# Patient Record
Sex: MALE | Race: BLACK OR AFRICAN AMERICAN | NOT HISPANIC OR LATINO | Employment: OTHER | URBAN - METROPOLITAN AREA
[De-identification: names, ages, dates, MRNs, and addresses within clinical notes are randomized per-mention and may not be internally consistent; named-entity substitution may affect disease eponyms.]

---

## 2017-01-12 ENCOUNTER — OFFICE VISIT (OUTPATIENT)
Dept: FAMILY MEDICINE | Facility: CLINIC | Age: 67
End: 2017-01-12
Payer: MEDICARE

## 2017-01-12 VITALS
HEART RATE: 71 BPM | OXYGEN SATURATION: 97 % | TEMPERATURE: 98 F | BODY MASS INDEX: 23.08 KG/M2 | HEIGHT: 67 IN | SYSTOLIC BLOOD PRESSURE: 160 MMHG | DIASTOLIC BLOOD PRESSURE: 88 MMHG | WEIGHT: 147.06 LBS

## 2017-01-12 DIAGNOSIS — F99 MENTAL DISORDER: ICD-10-CM

## 2017-01-12 DIAGNOSIS — F10.10 EXCESSIVE DRINKING ALCOHOL: ICD-10-CM

## 2017-01-12 DIAGNOSIS — D50.9 MICROCYTIC ANEMIA: ICD-10-CM

## 2017-01-12 DIAGNOSIS — I10 ESSENTIAL HYPERTENSION: Primary | ICD-10-CM

## 2017-01-12 LAB
ANISOCYTOSIS BLD QL SMEAR: ABNORMAL
BASOPHILS # BLD AUTO: 0.09 K/UL
BASOPHILS NFR BLD: 1.3 %
BURR CELLS BLD QL SMEAR: ABNORMAL
DACRYOCYTES BLD QL SMEAR: ABNORMAL
DIFFERENTIAL METHOD: ABNORMAL
EOSINOPHIL # BLD AUTO: 0.2 K/UL
EOSINOPHIL NFR BLD: 3.4 %
ERYTHROCYTE [DISTWIDTH] IN BLOOD BY AUTOMATED COUNT: 20.8 %
HCT VFR BLD AUTO: 32.1 %
HGB BLD-MCNC: 9.1 G/DL
HYPOCHROMIA BLD QL SMEAR: ABNORMAL
LYMPHOCYTES # BLD AUTO: 2.5 K/UL
LYMPHOCYTES NFR BLD: 36 %
MCH RBC QN AUTO: 17 PG
MCHC RBC AUTO-ENTMCNC: 28.3 %
MCV RBC AUTO: 60 FL
MONOCYTES # BLD AUTO: 1.1 K/UL
MONOCYTES NFR BLD: 16 %
NEUTROPHILS # BLD AUTO: 2.9 K/UL
NEUTROPHILS NFR BLD: 43.3 %
OVALOCYTES BLD QL SMEAR: ABNORMAL
PLATELET # BLD AUTO: 407 K/UL
PLATELET BLD QL SMEAR: ABNORMAL
PMV BLD AUTO: 9.4 FL
POIKILOCYTOSIS BLD QL SMEAR: ABNORMAL
POLYCHROMASIA BLD QL SMEAR: ABNORMAL
RBC # BLD AUTO: 5.36 M/UL
SCHISTOCYTES BLD QL SMEAR: ABNORMAL
SCHISTOCYTES BLD QL SMEAR: PRESENT
TARGETS BLD QL SMEAR: ABNORMAL
WBC # BLD AUTO: 6.83 K/UL

## 2017-01-12 PROCEDURE — 3077F SYST BP >= 140 MM HG: CPT | Mod: S$GLB,,, | Performed by: FAMILY MEDICINE

## 2017-01-12 PROCEDURE — 99499 UNLISTED E&M SERVICE: CPT | Mod: S$GLB,,, | Performed by: FAMILY MEDICINE

## 2017-01-12 PROCEDURE — 3079F DIAST BP 80-89 MM HG: CPT | Mod: S$GLB,,, | Performed by: FAMILY MEDICINE

## 2017-01-12 PROCEDURE — 1159F MED LIST DOCD IN RCRD: CPT | Mod: S$GLB,,, | Performed by: FAMILY MEDICINE

## 2017-01-12 PROCEDURE — 99213 OFFICE O/P EST LOW 20 MIN: CPT | Mod: S$GLB,,, | Performed by: FAMILY MEDICINE

## 2017-01-12 PROCEDURE — 85025 COMPLETE CBC W/AUTO DIFF WBC: CPT

## 2017-01-12 RX ORDER — AMLODIPINE BESYLATE 10 MG/1
10 TABLET ORAL DAILY
Qty: 30 TABLET | Refills: 11 | Status: SHIPPED | OUTPATIENT
Start: 2017-01-12 | End: 2018-01-22 | Stop reason: SDUPTHER

## 2017-01-12 NOTE — PROGRESS NOTES
Subjective:      Patient ID: Gama Huertas is a 66 y.o. male.    Chief Complaint: Follow-up (3 month f/u check-up and tetanus shot)    HPI Comments: Follow up hypertension, anemia; last visit august; never returned til now;     Review of Systems   Constitutional: Negative for appetite change, fatigue, fever and unexpected weight change.        Quit drinking bourbon and coke 5 months ago; has a lady friend   HENT: Negative for congestion, ear pain, sinus pressure and sore throat.    Eyes: Negative for pain and visual disturbance.   Respiratory: Negative for shortness of breath.    Cardiovascular: Negative for chest pain.   Gastrointestinal: Negative for abdominal pain, constipation and diarrhea.   Endocrine: Negative for polyuria.   Genitourinary: Negative for difficulty urinating and frequency.   Musculoskeletal: Negative for arthralgias, back pain and myalgias.        Spasms,   Skin: Negative for color change.        Itches all over   Allergic/Immunologic: Negative.    Neurological: Negative for syncope, weakness and headaches.   Hematological: Does not bruise/bleed easily.   Psychiatric/Behavioral: Negative for dysphoric mood and suicidal ideas. The patient is not nervous/anxious.    All other systems reviewed and are negative.    Objective:     Physical Exam   Constitutional: He is oriented to person, place, and time. He appears well-developed and well-nourished. No distress.   HENT:   Head: Normocephalic and atraumatic.   Right Ear: External ear normal.   Left Ear: External ear normal.   Mouth/Throat: Oropharynx is clear and moist. No oropharyngeal exudate.   Eyes: Conjunctivae and EOM are normal. Pupils are equal, round, and reactive to light. Right eye exhibits no discharge. Left eye exhibits no discharge. No scleral icterus.   Neck: Normal range of motion. Neck supple. No JVD present. No tracheal deviation present. No thyromegaly present.   Cardiovascular: Normal rate and regular rhythm.  Exam reveals no  gallop and no friction rub.    No murmur heard.  Pulmonary/Chest: Effort normal and breath sounds normal. No stridor. No respiratory distress. He has no wheezes. He has no rales. He exhibits no tenderness.   Abdominal: Soft. He exhibits no distension and no mass. There is no tenderness. There is no rebound and no guarding.   Musculoskeletal: Normal range of motion. He exhibits no edema or tenderness.   Lymphadenopathy:     He has no cervical adenopathy.   Neurological: He is alert and oriented to person, place, and time. He has normal reflexes. He displays normal reflexes. No cranial nerve deficit. He exhibits normal muscle tone. Coordination normal.   Skin: Skin is warm and dry. No rash noted. He is not diaphoretic. No erythema. No pallor.   Psychiatric: He has a normal mood and affect.   Bizarre affect as always   Nursing note and vitals reviewed.    Assessment:     1. Essential hypertension    2. Microcytic anemia    3. Excessive drinking alcohol    4. Mental disorder      Plan:     New Prescriptions    AMLODIPINE (NORVASC) 10 MG TABLET    Take 1 tablet (10 mg total) by mouth once daily.     Discontinued Medications    HYDROXYZINE HCL (ATARAX) 25 MG TABLET    TAKE 1 TABLET BY MOUTH THREE TIMES A DAY AS NEEDED FOR ITCHING     Modified Medications    No medications on file       Essential hypertension  -     CBC auto differential; Future; Expected date: 1/12/17    Microcytic anemia  -     CBC auto differential; Future; Expected date: 1/12/17    Excessive drinking alcohol  -     CBC auto differential; Future; Expected date: 1/12/17    Mental disorder  -     CBC auto differential; Future; Expected date: 1/12/17    Other orders  -     amlodipine (NORVASC) 10 MG tablet; Take 1 tablet (10 mg total) by mouth once daily.  Dispense: 30 tablet; Refill: 11

## 2017-01-13 ENCOUNTER — TELEPHONE (OUTPATIENT)
Dept: FAMILY MEDICINE | Facility: CLINIC | Age: 67
End: 2017-01-13

## 2017-01-13 DIAGNOSIS — K25.4 CHRONIC GASTRIC ULCER WITH HEMORRHAGE: Primary | ICD-10-CM

## 2017-01-13 NOTE — TELEPHONE ENCOUNTER
Needs to See GI to follow up anemia and gastric ulcers found 2015 on EGD; help pt get appt in office with Dr Lopez/Twyla

## 2017-01-13 NOTE — TELEPHONE ENCOUNTER
Dr. Meléndez reviewed previous endoscopy in [place], referral written to follow-up with GI for gastric ulcers.

## 2017-01-13 NOTE — TELEPHONE ENCOUNTER
----- Message from Richard Meléndez MD sent at 1/13/2017  6:25 AM CST -----  See if John and Twyla have copies of previous endoscopies.

## 2017-03-26 RX ORDER — ALPRAZOLAM 0.25 MG/1
TABLET ORAL
Qty: 90 TABLET | Refills: 0 | Status: SHIPPED | OUTPATIENT
Start: 2017-03-26 | End: 2017-06-21 | Stop reason: SDUPTHER

## 2017-06-23 RX ORDER — ALPRAZOLAM 0.25 MG/1
TABLET ORAL
Qty: 90 TABLET | Refills: 0 | Status: SHIPPED | OUTPATIENT
Start: 2017-06-23 | End: 2017-09-22 | Stop reason: SDUPTHER

## 2017-07-10 ENCOUNTER — OFFICE VISIT (OUTPATIENT)
Dept: FAMILY MEDICINE | Facility: CLINIC | Age: 67
End: 2017-07-10
Payer: MEDICARE

## 2017-07-10 VITALS
BODY MASS INDEX: 22.15 KG/M2 | DIASTOLIC BLOOD PRESSURE: 78 MMHG | RESPIRATION RATE: 18 BRPM | HEIGHT: 67 IN | TEMPERATURE: 97 F | HEART RATE: 85 BPM | WEIGHT: 141.13 LBS | OXYGEN SATURATION: 97 % | SYSTOLIC BLOOD PRESSURE: 134 MMHG

## 2017-07-10 DIAGNOSIS — K25.9 GASTRIC ULCER, UNSPECIFIED CHRONICITY, UNSPECIFIED WHETHER GASTRIC ULCER HEMORRHAGE OR PERFORATION PRESENT: ICD-10-CM

## 2017-07-10 DIAGNOSIS — F41.9 ANXIETY: ICD-10-CM

## 2017-07-10 DIAGNOSIS — Z13.6 ENCOUNTER FOR ABDOMINAL AORTIC ANEURYSM (AAA) SCREENING: Primary | ICD-10-CM

## 2017-07-10 DIAGNOSIS — D50.9 MICROCYTIC ANEMIA: ICD-10-CM

## 2017-07-10 DIAGNOSIS — I10 ESSENTIAL HYPERTENSION: ICD-10-CM

## 2017-07-10 DIAGNOSIS — Z86.010 HISTORY OF COLON POLYPS: ICD-10-CM

## 2017-07-10 PROBLEM — Z86.0100 HISTORY OF COLON POLYPS: Status: ACTIVE | Noted: 2017-07-10

## 2017-07-10 PROCEDURE — G0009 ADMIN PNEUMOCOCCAL VACCINE: HCPCS | Mod: S$GLB,,, | Performed by: FAMILY MEDICINE

## 2017-07-10 PROCEDURE — 90670 PCV13 VACCINE IM: CPT | Mod: S$GLB,,, | Performed by: FAMILY MEDICINE

## 2017-07-10 PROCEDURE — 99213 OFFICE O/P EST LOW 20 MIN: CPT | Mod: S$GLB,,, | Performed by: FAMILY MEDICINE

## 2017-07-10 PROCEDURE — 99499 UNLISTED E&M SERVICE: CPT | Mod: S$GLB,,, | Performed by: FAMILY MEDICINE

## 2017-07-10 RX ORDER — METOPROLOL TARTRATE 50 MG/1
50 TABLET ORAL 2 TIMES DAILY
Qty: 60 TABLET | Refills: 11 | Status: SHIPPED | OUTPATIENT
Start: 2017-07-10 | End: 2018-07-10

## 2017-07-10 NOTE — PROGRESS NOTES
Subjective:      Patient ID: Gama Huertas is a 66 y.o. male.    Chief Complaint: Follow-up (itching every where, acid reflux)    Itch all over and acid reflux; taking pepto bismal and antacid; old chart found; had EGD and colon; had a polyp, due colon. This yearf and had gastric ulcer; has bottle of advil he was asking if ok to take; told no advivl and petop bismal;       Review of Systems   Constitutional: Negative for appetite change, fatigue, fever and unexpected weight change.   HENT: Negative for congestion, ear pain, sinus pressure and sore throat.    Eyes: Negative for pain and visual disturbance.   Respiratory: Negative for shortness of breath.    Cardiovascular: Negative for chest pain.   Gastrointestinal: Positive for diarrhea. Negative for abdominal pain and constipation.   Endocrine: Negative for polyuria.   Genitourinary: Negative for difficulty urinating and frequency.   Musculoskeletal: Negative for arthralgias, back pain and myalgias.   Skin: Negative for color change.   Allergic/Immunologic: Negative.    Neurological: Negative for syncope, weakness and headaches.   Hematological: Does not bruise/bleed easily.   Psychiatric/Behavioral: Negative for dysphoric mood and suicidal ideas. The patient is not nervous/anxious.    All other systems reviewed and are negative.    Objective:     Physical Exam   Constitutional: He is oriented to person, place, and time. He appears well-developed and well-nourished. No distress.   HENT:   Head: Normocephalic and atraumatic.   Right Ear: External ear normal.   Left Ear: External ear normal.   Mouth/Throat: Oropharynx is clear and moist. No oropharyngeal exudate.   Eyes: Conjunctivae and EOM are normal. Pupils are equal, round, and reactive to light. Right eye exhibits no discharge. Left eye exhibits no discharge. No scleral icterus.   Neck: Normal range of motion. Neck supple. No JVD present. No tracheal deviation present. No thyromegaly present.    Cardiovascular: Normal rate and regular rhythm.  Exam reveals no gallop and no friction rub.    No murmur heard.  Pulmonary/Chest: Effort normal and breath sounds normal. No stridor. No respiratory distress. He has no wheezes. He has no rales. He exhibits no tenderness.   Abdominal: Soft. He exhibits no distension and no mass. There is no tenderness. There is no rebound and no guarding.   Musculoskeletal: Normal range of motion. He exhibits no edema or tenderness.   Lymphadenopathy:     He has no cervical adenopathy.   Neurological: He is alert and oriented to person, place, and time. He has normal reflexes. He displays normal reflexes. No cranial nerve deficit. He exhibits normal muscle tone. Coordination normal.   Skin: Skin is warm and dry. No rash noted. He is not diaphoretic. No erythema. No pallor.   Psychiatric: He has a normal mood and affect. His behavior is normal. Judgment and thought content normal.   Nursing note and vitals reviewed.    Assessment:     1. Encounter for abdominal aortic aneurysm (AAA) screening    2. Essential hypertension    3. Anxiety    4. Microcytic anemia    5. Gastric ulcer, unspecified chronicity, unspecified whether gastric ulcer hemorrhage or perforation present    6. History of colon polyps      Plan:     New Prescriptions    No medications on file     Discontinued Medications    No medications on file     Modified Medications    Modified Medication Previous Medication    METOPROLOL TARTRATE (LOPRESSOR) 50 MG TABLET metoprolol tartrate (LOPRESSOR) 50 MG tablet       Take 1 tablet (50 mg total) by mouth 2 (two) times daily.    Take 1 tablet (50 mg total) by mouth 2 (two) times daily.       Encounter for abdominal aortic aneurysm (AAA) screening  -     US AAA Screening; Future; Expected date: 07/10/2017  -     CBC auto differential; Future; Expected date: 07/10/2017  -     Comprehensive metabolic panel; Future; Expected date: 07/10/2017  -     Ambulatory referral to  Gastroenterology    Essential hypertension  -     CBC auto differential; Future; Expected date: 07/10/2017  -     Comprehensive metabolic panel; Future; Expected date: 07/10/2017  -     Ambulatory referral to Gastroenterology    Anxiety  -     CBC auto differential; Future; Expected date: 07/10/2017  -     Comprehensive metabolic panel; Future; Expected date: 07/10/2017  -     Ambulatory referral to Gastroenterology    Microcytic anemia  -     CBC auto differential; Future; Expected date: 07/10/2017  -     Comprehensive metabolic panel; Future; Expected date: 07/10/2017  -     Ambulatory referral to Gastroenterology    Gastric ulcer, unspecified chronicity, unspecified whether gastric ulcer hemorrhage or perforation present  -     CBC auto differential; Future; Expected date: 07/10/2017  -     Comprehensive metabolic panel; Future; Expected date: 07/10/2017  -     Ambulatory referral to Gastroenterology    History of colon polyps  -     CBC auto differential; Future; Expected date: 07/10/2017  -     Comprehensive metabolic panel; Future; Expected date: 07/10/2017  -     Ambulatory referral to Gastroenterology    Other orders  -     Pneumococcal Conjugate Vaccine (13 Valent) (IM)  -     metoprolol tartrate (LOPRESSOR) 50 MG tablet; Take 1 tablet (50 mg total) by mouth 2 (two) times daily.  Dispense: 60 tablet; Refill: 11

## 2017-07-17 ENCOUNTER — HOSPITAL ENCOUNTER (OUTPATIENT)
Dept: RADIOLOGY | Facility: HOSPITAL | Age: 67
Discharge: HOME | End: 2017-07-17
Attending: FAMILY MEDICINE
Payer: MEDICARE

## 2017-07-17 DIAGNOSIS — Z13.6 ENCOUNTER FOR ABDOMINAL AORTIC ANEURYSM (AAA) SCREENING: ICD-10-CM

## 2017-07-17 PROCEDURE — 76706 US ABDL AORTA SCREEN AAA: CPT | Mod: TC,PO

## 2017-07-18 ENCOUNTER — TELEPHONE (OUTPATIENT)
Dept: FAMILY MEDICINE | Facility: CLINIC | Age: 67
End: 2017-07-18

## 2017-07-18 NOTE — TELEPHONE ENCOUNTER
----- Message from Richard Meléndez MD sent at 7/17/2017 10:07 PM CDT -----  CALL PT TESTS ARE NORMAL

## 2017-08-03 ENCOUNTER — INITIAL CONSULT (OUTPATIENT)
Dept: GASTROENTEROLOGY | Facility: CLINIC | Age: 67
End: 2017-08-03
Payer: MEDICARE

## 2017-08-03 VITALS
SYSTOLIC BLOOD PRESSURE: 125 MMHG | HEART RATE: 72 BPM | WEIGHT: 140.19 LBS | BODY MASS INDEX: 22 KG/M2 | DIASTOLIC BLOOD PRESSURE: 77 MMHG | HEIGHT: 67 IN

## 2017-08-03 DIAGNOSIS — K21.9 GASTROESOPHAGEAL REFLUX DISEASE, ESOPHAGITIS PRESENCE NOT SPECIFIED: ICD-10-CM

## 2017-08-03 DIAGNOSIS — K25.4 CHRONIC GASTRIC ULCER WITH HEMORRHAGE: Primary | ICD-10-CM

## 2017-08-03 DIAGNOSIS — D50.0 IRON DEFICIENCY ANEMIA DUE TO CHRONIC BLOOD LOSS: ICD-10-CM

## 2017-08-03 DIAGNOSIS — Z86.19 HISTORY OF HELICOBACTER PYLORI INFECTION: ICD-10-CM

## 2017-08-03 DIAGNOSIS — R10.13 DYSPEPSIA: ICD-10-CM

## 2017-08-03 PROCEDURE — 99999 PR PBB SHADOW E&M-EST. PATIENT-LVL III: CPT | Mod: PBBFAC,,, | Performed by: INTERNAL MEDICINE

## 2017-08-03 PROCEDURE — 3008F BODY MASS INDEX DOCD: CPT | Mod: S$GLB,,, | Performed by: INTERNAL MEDICINE

## 2017-08-03 PROCEDURE — 99214 OFFICE O/P EST MOD 30 MIN: CPT | Mod: S$GLB,,, | Performed by: INTERNAL MEDICINE

## 2017-08-03 RX ORDER — FERROUS SULFATE 325(65) MG
325 TABLET ORAL DAILY
Qty: 30 TABLET | Refills: 3 | Status: SHIPPED | OUTPATIENT
Start: 2017-08-03 | End: 2017-12-04 | Stop reason: SDUPTHER

## 2017-08-03 RX ORDER — PANTOPRAZOLE SODIUM 40 MG/1
40 TABLET, DELAYED RELEASE ORAL DAILY
Qty: 30 TABLET | Refills: 3 | Status: SHIPPED | OUTPATIENT
Start: 2017-08-03 | End: 2017-12-04 | Stop reason: SDUPTHER

## 2017-08-03 NOTE — LETTER
August 3, 2017      Richard Meléndez MD  [address] [facility] [address]           [facility]  [address], [address],  [facility] [address]  Phone: [phone]  Fax: [phone]          Patient: Gama Huertas   MR Number: [id number]   YOB: 1950   Date of Visit: 8/3/2017       Dear Dr. Richard Meléndez:    Thank you for referring Gama Huertas to me for evaluation. Attached you will find relevant portions of my assessment and plan of care.    If you have questions, please do not hesitate to call me. I look forward to following Gama Huertas along with you.    Sincerely,    Les Wakefield Jr., MD    Enclosure  CC:  No Recipients    If you would like to receive this communication electronically, please contact [email or web address] or [phone] to request more information on [employer] access.    For providers and/or their staff who would like to refer a patient to [facility], please contact us through our one-stop-shop provider referral line, [facility], at [phone].    If you feel you have received this communication in error or would no longer like to receive these types of communications, please e-mail [email or web address]

## 2017-08-03 NOTE — PATIENT INSTRUCTIONS

## 2017-08-03 NOTE — PROGRESS NOTES
Subjective:      Patient ID: Gama Huertas is a 66 y.o. male.    Chief Complaint: Anemia; Constipation; Diarrhea; and Heartburn    HPI:      Patient presents for GI followup.    He has a history of recurrent ulcer disease.  Prior history of iron deficiency.  January 2017 labs demonstrate an MCV of 60 and hematocrit 32.  Iron percent saturation and ferritin were both low.   Patient is currently not on a PPI or iron replacement.   Gastroscopy in 2015 demonstrated multiple antral ulcers and deformity.  Biopsy returned positive for H. pylori.  This is an additional risk factor for this patient.    At that time patient wasn't active alcoholic and taking anti-inflammatories against medical advice  Indicates that he quit alcohol completely in May 2017.  Indicates he is avoiding NSAIDs now.  Reliability seems fair at this time    In 2015 he was provided samples of Pylera adequate to complete a 10 day course.  His compliance is unclear      Previous history of iron deficiency anemia.  Hematocrit 30.  Platelet count 335.  Iron and ferritin are low.  B12 and folate have been normal.  Previous GI workup for iron deficiency anemia in March 2014.  Colonoscopy revealed one small adenoma which was removed.    Patient had deformity of the antrum and chronic prepyloric ulceration.  Biopsy was benign and negative for H. pylori in 2014.    Patient was noncompliant with followup.  GI systems review  is relatively unremarkable.  He does have complaints of some epigastric pain and heartburn for which he takes liquid antacids.    Past history includes hypertension and heme-positive stool.  Smokes cigarettes.  He drank beer daily in the past.    Family history apparently negative for GI neoplasm         Review of patient's allergies indicates:  No Known Allergies  Past Medical History:   Diagnosis Date    Gastric ulcer     Hypertension      Past Surgical History:   Procedure Laterality Date    COLONOSCOPY  3/11/14     repeat in 3  "years dr montgomery     Family History   Problem Relation Age of Onset    Heart disease Mother     Stomach cancer Father     Kidney failure Father      Social History     Social History    Marital status: Single     Spouse name: N/A    Number of children: N/A    Years of education: N/A     Occupational History    Not on file.     Social History Main Topics    Smoking status: Current Every Day Smoker    Smokeless tobacco: Never Used    Alcohol use Yes    Drug use: Unknown    Sexual activity: Not on file     Other Topics Concern    Not on file     Social History Narrative    No narrative on file       Review of Systems:  Constitutional: Negative for appetite change.  Fatigue.  HENT: Negative for trouble swallowing.   Eyes: Negative for photophobia.   Respiratory: Positive for cough and shortness of breath.   Cardiovascular: Negative for palpitations.   Gastrointestinal: See HPI for details.  Genitourinary: Negative for frequency and hematuria.  Musculoskeletal: Occasional joint pains   Skin: Negative for rash.   Neurological: Negative for weakness and headaches.   Hematological: Negative.   Psychiatric/Behavioral: Negative for suicidal ideas and behavioral problems.     Objective:     /77 (BP Location: Right arm, Patient Position: Sitting)   Pulse 72   Ht 5' 7" (1.702 m)   Wt 63.6 kg (140 lb 3.2 oz)   BMI 21.96 kg/m²     Physical Exam:  Eyes: Pupils are equal, round, and reactive to light.   Neck: Supple. No mass  Cardiovascular: Regular rhythm . No murmur   Pulmonary/Chest: Lungs clear   Abdominal: Soft. No mass palpated. Nontender, no guarding. Positive bowel sounds   Musculoskeletal: No deformity. No edema.   Psychiatric: Alert and oriented    Assessment:     1. Chronic gastric ulcer with hemorrhage    2. Gastroesophageal reflux disease, esophagitis presence not specified    3. Dyspepsia    4. Iron deficiency anemia due to chronic blood loss    5. History of Helicobacter pylori infection  "     Plan:     Gama was seen today for anemia, constipation, diarrhea and heartburn.    Diagnoses and all orders for this visit:    Chronic gastric ulcer with hemorrhage  -     pantoprazole (PROTONIX) 40 MG tablet; Take 1 tablet (40 mg total) by mouth once daily.  -     ferrous sulfate 325 mg (65 mg iron) Tab tablet; Take 1 tablet (325 mg total) by mouth once daily.    Gastroesophageal reflux disease, esophagitis presence not specified  -     pantoprazole (PROTONIX) 40 MG tablet; Take 1 tablet (40 mg total) by mouth once daily.  -     ferrous sulfate 325 mg (65 mg iron) Tab tablet; Take 1 tablet (325 mg total) by mouth once daily.    Dyspepsia  -     pantoprazole (PROTONIX) 40 MG tablet; Take 1 tablet (40 mg total) by mouth once daily.  -     ferrous sulfate 325 mg (65 mg iron) Tab tablet; Take 1 tablet (325 mg total) by mouth once daily.    Iron deficiency anemia due to chronic blood loss  -     pantoprazole (PROTONIX) 40 MG tablet; Take 1 tablet (40 mg total) by mouth once daily.  -     ferrous sulfate 325 mg (65 mg iron) Tab tablet; Take 1 tablet (325 mg total) by mouth once daily.    History of Helicobacter pylori infection      Plan:  Oral iron replacement  PPI daily  Encourage continued abstinence from alcohol and NSAIDs  Gastroscopy

## 2017-08-20 RX ORDER — METOPROLOL TARTRATE 50 MG/1
TABLET ORAL
Qty: 60 TABLET | Refills: 11 | Status: SHIPPED | OUTPATIENT
Start: 2017-08-20 | End: 2018-08-23 | Stop reason: SDUPTHER

## 2017-09-24 RX ORDER — ALPRAZOLAM 0.25 MG/1
TABLET ORAL
Qty: 90 TABLET | Refills: 0 | Status: SHIPPED | OUTPATIENT
Start: 2017-09-24 | End: 2017-12-28 | Stop reason: SDUPTHER

## 2017-12-04 DIAGNOSIS — R10.13 DYSPEPSIA: ICD-10-CM

## 2017-12-04 DIAGNOSIS — K21.9 GASTROESOPHAGEAL REFLUX DISEASE, ESOPHAGITIS PRESENCE NOT SPECIFIED: ICD-10-CM

## 2017-12-04 DIAGNOSIS — K25.4 CHRONIC GASTRIC ULCER WITH HEMORRHAGE: ICD-10-CM

## 2017-12-04 DIAGNOSIS — D50.0 IRON DEFICIENCY ANEMIA DUE TO CHRONIC BLOOD LOSS: ICD-10-CM

## 2017-12-05 RX ORDER — PANTOPRAZOLE SODIUM 40 MG/1
TABLET, DELAYED RELEASE ORAL
Qty: 30 TABLET | Refills: 3 | Status: SHIPPED | OUTPATIENT
Start: 2017-12-05 | End: 2018-04-12 | Stop reason: SDUPTHER

## 2017-12-05 RX ORDER — FERROUS SULFATE 325(65) MG
TABLET ORAL
Qty: 30 TABLET | Refills: 3 | Status: SHIPPED | OUTPATIENT
Start: 2017-12-05 | End: 2018-04-12 | Stop reason: SDUPTHER

## 2017-12-28 RX ORDER — ALPRAZOLAM 0.25 MG/1
TABLET ORAL
Qty: 90 TABLET | Refills: 0 | Status: SHIPPED | OUTPATIENT
Start: 2017-12-28 | End: 2018-08-23 | Stop reason: SDUPTHER

## 2018-01-16 ENCOUNTER — PES CALL (OUTPATIENT)
Dept: ADMINISTRATIVE | Facility: CLINIC | Age: 68
End: 2018-01-16

## 2018-01-22 RX ORDER — AMLODIPINE BESYLATE 10 MG/1
TABLET ORAL
Qty: 30 TABLET | Refills: 11 | Status: SHIPPED | OUTPATIENT
Start: 2018-01-22 | End: 2018-08-23 | Stop reason: SDUPTHER

## 2018-03-16 ENCOUNTER — PES CALL (OUTPATIENT)
Dept: ADMINISTRATIVE | Facility: CLINIC | Age: 68
End: 2018-03-16

## 2018-03-24 RX ORDER — ALPRAZOLAM 0.25 MG/1
TABLET ORAL
Qty: 90 TABLET | Refills: 0 | OUTPATIENT
Start: 2018-03-24

## 2018-04-12 DIAGNOSIS — D50.0 IRON DEFICIENCY ANEMIA DUE TO CHRONIC BLOOD LOSS: ICD-10-CM

## 2018-04-12 DIAGNOSIS — K21.9 GASTROESOPHAGEAL REFLUX DISEASE, ESOPHAGITIS PRESENCE NOT SPECIFIED: ICD-10-CM

## 2018-04-12 DIAGNOSIS — R10.13 DYSPEPSIA: ICD-10-CM

## 2018-04-12 DIAGNOSIS — K25.4 CHRONIC GASTRIC ULCER WITH HEMORRHAGE: ICD-10-CM

## 2018-04-12 RX ORDER — FERROUS SULFATE 325(65) MG
TABLET ORAL
Qty: 30 TABLET | Refills: 3 | Status: SHIPPED | OUTPATIENT
Start: 2018-04-12 | End: 2018-08-23 | Stop reason: SDUPTHER

## 2018-04-12 RX ORDER — PANTOPRAZOLE SODIUM 40 MG/1
TABLET, DELAYED RELEASE ORAL
Qty: 30 TABLET | Refills: 3 | Status: SHIPPED | OUTPATIENT
Start: 2018-04-12 | End: 2018-08-23 | Stop reason: SDUPTHER

## 2018-04-14 RX ORDER — ALPRAZOLAM 0.25 MG/1
TABLET ORAL
Qty: 90 TABLET | Refills: 0 | OUTPATIENT
Start: 2018-04-14

## 2018-04-20 RX ORDER — ALPRAZOLAM 0.25 MG/1
TABLET ORAL
Qty: 90 TABLET | Refills: 0 | OUTPATIENT
Start: 2018-04-20

## 2018-07-20 ENCOUNTER — PES CALL (OUTPATIENT)
Dept: ADMINISTRATIVE | Facility: CLINIC | Age: 68
End: 2018-07-20

## 2018-08-03 DIAGNOSIS — D50.0 IRON DEFICIENCY ANEMIA DUE TO CHRONIC BLOOD LOSS: ICD-10-CM

## 2018-08-03 DIAGNOSIS — K21.9 GASTROESOPHAGEAL REFLUX DISEASE, ESOPHAGITIS PRESENCE NOT SPECIFIED: ICD-10-CM

## 2018-08-03 DIAGNOSIS — R10.13 DYSPEPSIA: ICD-10-CM

## 2018-08-03 DIAGNOSIS — K25.4 CHRONIC GASTRIC ULCER WITH HEMORRHAGE: ICD-10-CM

## 2018-08-03 RX ORDER — PANTOPRAZOLE SODIUM 40 MG/1
TABLET, DELAYED RELEASE ORAL
Qty: 30 TABLET | Refills: 3 | OUTPATIENT
Start: 2018-08-03

## 2018-08-10 DIAGNOSIS — D50.0 IRON DEFICIENCY ANEMIA DUE TO CHRONIC BLOOD LOSS: ICD-10-CM

## 2018-08-10 DIAGNOSIS — K21.9 GASTROESOPHAGEAL REFLUX DISEASE, ESOPHAGITIS PRESENCE NOT SPECIFIED: ICD-10-CM

## 2018-08-10 DIAGNOSIS — R10.13 DYSPEPSIA: ICD-10-CM

## 2018-08-10 DIAGNOSIS — K25.4 CHRONIC GASTRIC ULCER WITH HEMORRHAGE: ICD-10-CM

## 2018-08-12 RX ORDER — FERROUS SULFATE 325(65) MG
1 TABLET ORAL DAILY
Qty: 30 TABLET | Refills: 3 | OUTPATIENT
Start: 2018-08-12

## 2018-08-14 DIAGNOSIS — R10.13 DYSPEPSIA: ICD-10-CM

## 2018-08-14 DIAGNOSIS — D50.0 IRON DEFICIENCY ANEMIA DUE TO CHRONIC BLOOD LOSS: ICD-10-CM

## 2018-08-14 DIAGNOSIS — K25.4 CHRONIC GASTRIC ULCER WITH HEMORRHAGE: ICD-10-CM

## 2018-08-14 DIAGNOSIS — K21.9 GASTROESOPHAGEAL REFLUX DISEASE, ESOPHAGITIS PRESENCE NOT SPECIFIED: ICD-10-CM

## 2018-08-14 NOTE — TELEPHONE ENCOUNTER
Called pt back to explain that we received his request for the iron tablets but that Dr Meléndez has refused it due to needing an appt. No answer left message on VM explaining such and requesting a call back to schedule this appt

## 2018-08-15 RX ORDER — FERROUS SULFATE 325(65) MG
1 TABLET ORAL DAILY
Qty: 30 TABLET | Refills: 3 | OUTPATIENT
Start: 2018-08-15

## 2018-08-23 ENCOUNTER — OFFICE VISIT (OUTPATIENT)
Dept: FAMILY MEDICINE | Facility: CLINIC | Age: 68
End: 2018-08-23
Payer: MEDICARE

## 2018-08-23 VITALS
WEIGHT: 158 LBS | DIASTOLIC BLOOD PRESSURE: 76 MMHG | HEART RATE: 66 BPM | SYSTOLIC BLOOD PRESSURE: 128 MMHG | TEMPERATURE: 98 F | BODY MASS INDEX: 24.8 KG/M2 | HEIGHT: 67 IN | OXYGEN SATURATION: 99 %

## 2018-08-23 DIAGNOSIS — I10 ESSENTIAL HYPERTENSION: Primary | ICD-10-CM

## 2018-08-23 DIAGNOSIS — K21.9 GASTROESOPHAGEAL REFLUX DISEASE, ESOPHAGITIS PRESENCE NOT SPECIFIED: ICD-10-CM

## 2018-08-23 DIAGNOSIS — M54.2 NECK PAIN ON LEFT SIDE: ICD-10-CM

## 2018-08-23 DIAGNOSIS — D50.0 IRON DEFICIENCY ANEMIA DUE TO CHRONIC BLOOD LOSS: ICD-10-CM

## 2018-08-23 DIAGNOSIS — M54.42 ACUTE LEFT-SIDED LOW BACK PAIN WITH LEFT-SIDED SCIATICA: ICD-10-CM

## 2018-08-23 DIAGNOSIS — F41.9 ANXIETY: ICD-10-CM

## 2018-08-23 DIAGNOSIS — Z23 NEED FOR PNEUMOCOCCAL VACCINATION: ICD-10-CM

## 2018-08-23 PROCEDURE — 3074F SYST BP LT 130 MM HG: CPT | Mod: CPTII,S$GLB,, | Performed by: NURSE PRACTITIONER

## 2018-08-23 PROCEDURE — 96372 THER/PROPH/DIAG INJ SC/IM: CPT | Mod: S$GLB,,, | Performed by: NURSE PRACTITIONER

## 2018-08-23 PROCEDURE — 90732 PPSV23 VACC 2 YRS+ SUBQ/IM: CPT | Mod: S$GLB,,, | Performed by: NURSE PRACTITIONER

## 2018-08-23 PROCEDURE — 99213 OFFICE O/P EST LOW 20 MIN: CPT | Mod: 25,S$GLB,, | Performed by: NURSE PRACTITIONER

## 2018-08-23 PROCEDURE — G0009 ADMIN PNEUMOCOCCAL VACCINE: HCPCS | Mod: 59,S$GLB,, | Performed by: NURSE PRACTITIONER

## 2018-08-23 PROCEDURE — 3078F DIAST BP <80 MM HG: CPT | Mod: CPTII,S$GLB,, | Performed by: NURSE PRACTITIONER

## 2018-08-23 RX ORDER — ALPRAZOLAM 0.25 MG/1
0.25 TABLET ORAL DAILY PRN
Qty: 30 TABLET | Refills: 0 | Status: SHIPPED | OUTPATIENT
Start: 2018-08-23 | End: 2018-09-18 | Stop reason: SDUPTHER

## 2018-08-23 RX ORDER — AMLODIPINE BESYLATE 10 MG/1
10 TABLET ORAL DAILY
Qty: 90 TABLET | Refills: 2 | Status: SHIPPED | OUTPATIENT
Start: 2018-08-23 | End: 2019-02-11 | Stop reason: SDUPTHER

## 2018-08-23 RX ORDER — METOPROLOL TARTRATE 50 MG/1
50 TABLET ORAL 2 TIMES DAILY
Qty: 90 TABLET | Refills: 2 | Status: SHIPPED | OUTPATIENT
Start: 2018-08-23 | End: 2019-02-11 | Stop reason: SDUPTHER

## 2018-08-23 RX ORDER — TRIAMCINOLONE ACETONIDE 40 MG/ML
40 INJECTION, SUSPENSION INTRA-ARTICULAR; INTRAMUSCULAR
Status: COMPLETED | OUTPATIENT
Start: 2018-08-23 | End: 2018-08-23

## 2018-08-23 RX ORDER — FERROUS SULFATE 325(65) MG
1 TABLET ORAL DAILY
Qty: 30 TABLET | Refills: 3 | Status: SHIPPED | OUTPATIENT
Start: 2018-08-23 | End: 2019-01-22 | Stop reason: SDUPTHER

## 2018-08-23 RX ORDER — PANTOPRAZOLE SODIUM 40 MG/1
40 TABLET, DELAYED RELEASE ORAL DAILY
Qty: 90 TABLET | Refills: 2 | Status: SHIPPED | OUTPATIENT
Start: 2018-08-23 | End: 2019-02-11 | Stop reason: SDUPTHER

## 2018-08-23 RX ADMIN — TRIAMCINOLONE ACETONIDE 40 MG: 40 INJECTION, SUSPENSION INTRA-ARTICULAR; INTRAMUSCULAR at 10:08

## 2018-08-23 NOTE — PROGRESS NOTES
Subjective:       Patient ID: Gama Huertas is a 67 y.o. male.    Chief Complaint: Check up; med refills (experiencing pain on left side of body; acid reflux, headaches )    Pt presents to the clinic today for pain on the left side. He using his left hand. He will shooting pains down his leg and down his right arm. Also has neck pain on the left side.  Has been having to use his right hand instead. He has been taking OTC pain medicine for this issue. Which is not working. He has been having acid reflux bad. He has been taking liquid anti acid medicine unsure of the name of the medicine. Needs refills on his medicine. Last blood work was done jan 2017 which shows he was anemic. PCP put orders in for patient to go have blood drawn again. Patient never went. Expressed the need to have this blood work re done.        Review of Systems   Constitutional: Negative.    Respiratory: Negative.    Cardiovascular: Negative.    Gastrointestinal:        Acid reflux     Genitourinary: Negative.    Musculoskeletal: Positive for back pain and neck pain.   Neurological: Negative.        Objective:      Physical Exam   Constitutional: He is oriented to person, place, and time. He appears well-developed and well-nourished. No distress.   HENT:   Head: Normocephalic.   Right Ear: Tympanic membrane and external ear normal.   Left Ear: Tympanic membrane and external ear normal.   Nose: No mucosal edema or rhinorrhea. Right sinus exhibits no maxillary sinus tenderness and no frontal sinus tenderness. Left sinus exhibits no maxillary sinus tenderness and no frontal sinus tenderness.   Mouth/Throat: No oropharyngeal exudate, posterior oropharyngeal edema or posterior oropharyngeal erythema.   Cardiovascular: Normal rate, regular rhythm and normal heart sounds.   Pulmonary/Chest: Effort normal and breath sounds normal. No respiratory distress.   Abdominal: Soft. Bowel sounds are normal. He exhibits no distension. There is no tenderness.    Musculoskeletal: Normal range of motion. He exhibits no edema, tenderness or deformity.        Cervical back: He exhibits pain.   States he has shooting pains down his right arm and leg     Neurological: He is alert and oriented to person, place, and time.   Skin: Skin is warm and dry. He is not diaphoretic.   Psychiatric: He has a normal mood and affect. His behavior is normal. Judgment and thought content normal.       Assessment:       1. Essential hypertension    2. Gastroesophageal reflux disease, esophagitis presence not specified    3. Iron deficiency anemia due to chronic blood loss    4. Anxiety    5. Acute left-sided low back pain with left-sided sciatica    6. Neck pain on left side    7. Need for pneumococcal vaccination        Plan:       Essential hypertension  -     amLODIPine (NORVASC) 10 MG tablet; Take 1 tablet (10 mg total) by mouth once daily.  Dispense: 90 tablet; Refill: 2  -     metoprolol tartrate (LOPRESSOR) 50 MG tablet; Take 1 tablet (50 mg total) by mouth 2 (two) times daily.  Dispense: 90 tablet; Refill: 2    Gastroesophageal reflux disease, esophagitis presence not specified  -     pantoprazole (PROTONIX) 40 MG tablet; Take 1 tablet (40 mg total) by mouth once daily.  Dispense: 90 tablet; Refill: 2    Iron deficiency anemia due to chronic blood loss  -     ferrous sulfate 325 mg (65 mg iron) Tab tablet; Take 1 tablet (325 mg total) by mouth once daily.  Dispense: 30 tablet; Refill: 3    Anxiety  -     ALPRAZolam (XANAX) 0.25 MG tablet; Take 1 tablet (0.25 mg total) by mouth daily as needed.  Dispense: 30 tablet; Refill: 0    Acute left-sided low back pain with left-sided sciatica    Neck pain on left side    Need for pneumococcal vaccination  -     (In Office Administered) Pneumococcal Polysaccharide Vaccine (23 Valent) (SQ/IM)    Other orders  -     triamcinolone acetonide injection 40 mg; Inject 1 mL (40 mg total) into the muscle one time.      States he will go have his blood work  re done

## 2018-09-06 ENCOUNTER — LAB VISIT (OUTPATIENT)
Dept: LAB | Facility: HOSPITAL | Age: 68
End: 2018-09-06
Attending: FAMILY MEDICINE
Payer: MEDICARE

## 2018-09-06 DIAGNOSIS — D50.9 MICROCYTIC ANEMIA: ICD-10-CM

## 2018-09-06 DIAGNOSIS — K25.9 GASTRIC ULCER, UNSPECIFIED CHRONICITY, UNSPECIFIED WHETHER GASTRIC ULCER HEMORRHAGE OR PERFORATION PRESENT: ICD-10-CM

## 2018-09-06 DIAGNOSIS — F41.9 ANXIETY: ICD-10-CM

## 2018-09-06 DIAGNOSIS — I10 ESSENTIAL HYPERTENSION: ICD-10-CM

## 2018-09-06 DIAGNOSIS — Z13.6 ENCOUNTER FOR ABDOMINAL AORTIC ANEURYSM (AAA) SCREENING: ICD-10-CM

## 2018-09-06 DIAGNOSIS — Z86.010 HISTORY OF COLON POLYPS: ICD-10-CM

## 2018-09-06 LAB
ALBUMIN SERPL BCP-MCNC: 4.3 G/DL
ALP SERPL-CCNC: 116 U/L
ALT SERPL W/O P-5'-P-CCNC: 15 U/L
ANION GAP SERPL CALC-SCNC: 9 MMOL/L
AST SERPL-CCNC: 22 U/L
BASOPHILS # BLD AUTO: 0.07 K/UL
BASOPHILS NFR BLD: 0.8 %
BILIRUB SERPL-MCNC: 0.4 MG/DL
BUN SERPL-MCNC: 20 MG/DL
CALCIUM SERPL-MCNC: 9.3 MG/DL
CHLORIDE SERPL-SCNC: 104 MMOL/L
CO2 SERPL-SCNC: 28 MMOL/L
CREAT SERPL-MCNC: 1.19 MG/DL
DIFFERENTIAL METHOD: ABNORMAL
EOSINOPHIL # BLD AUTO: 0.1 K/UL
EOSINOPHIL NFR BLD: 1.3 %
ERYTHROCYTE [DISTWIDTH] IN BLOOD BY AUTOMATED COUNT: 15.5 %
EST. GFR  (AFRICAN AMERICAN): >60 ML/MIN/1.73 M^2
EST. GFR  (NON AFRICAN AMERICAN): >60 ML/MIN/1.73 M^2
GLUCOSE SERPL-MCNC: 115 MG/DL
HCT VFR BLD AUTO: 48.5 %
HGB BLD-MCNC: 15.2 G/DL
LYMPHOCYTES # BLD AUTO: 2.8 K/UL
LYMPHOCYTES NFR BLD: 31.6 %
MCH RBC QN AUTO: 26.2 PG
MCHC RBC AUTO-ENTMCNC: 31.3 G/DL
MCV RBC AUTO: 84 FL
MONOCYTES # BLD AUTO: 1 K/UL
MONOCYTES NFR BLD: 10.7 %
NEUTROPHILS # BLD AUTO: 4.9 K/UL
NEUTROPHILS NFR BLD: 55.5 %
PLATELET # BLD AUTO: 347 K/UL
PMV BLD AUTO: 10.2 FL
POTASSIUM SERPL-SCNC: 5.7 MMOL/L
PROT SERPL-MCNC: 8.2 G/DL
RBC # BLD AUTO: 5.81 M/UL
SODIUM SERPL-SCNC: 141 MMOL/L
WBC # BLD AUTO: 8.9 K/UL

## 2018-09-06 PROCEDURE — 85025 COMPLETE CBC W/AUTO DIFF WBC: CPT | Mod: PO

## 2018-09-06 PROCEDURE — 80053 COMPREHEN METABOLIC PANEL: CPT | Mod: PO

## 2018-09-06 PROCEDURE — 36415 COLL VENOUS BLD VENIPUNCTURE: CPT | Mod: PO

## 2018-09-18 DIAGNOSIS — F41.9 ANXIETY: ICD-10-CM

## 2018-09-18 RX ORDER — ALPRAZOLAM 0.25 MG/1
TABLET ORAL
Qty: 30 TABLET | Refills: 0 | Status: SHIPPED | OUTPATIENT
Start: 2018-09-18 | End: 2022-02-11

## 2018-10-15 DIAGNOSIS — F41.9 ANXIETY: ICD-10-CM

## 2018-10-21 RX ORDER — ALPRAZOLAM 0.25 MG/1
TABLET ORAL
Qty: 30 TABLET | Refills: 0 | OUTPATIENT
Start: 2018-10-21

## 2019-01-11 DIAGNOSIS — D50.0 IRON DEFICIENCY ANEMIA DUE TO CHRONIC BLOOD LOSS: ICD-10-CM

## 2019-01-11 NOTE — TELEPHONE ENCOUNTER
----- Message from Joelle Delgado sent at 1/11/2019  9:26 AM CST -----  Patient is requesting a medication refill.     RX name: ferrous sulfate 325 mg (65 mg iron) Tab tablet  Strength:   Quantity:   Directions: Take 1 tablet (325 mg total) by mouth once daily. - Oral    Pharmacy name: [facility]

## 2019-01-13 RX ORDER — FERROUS SULFATE 325(65) MG
1 TABLET ORAL DAILY
Qty: 30 TABLET | Refills: 3 | OUTPATIENT
Start: 2019-01-13

## 2019-01-22 DIAGNOSIS — D50.0 IRON DEFICIENCY ANEMIA DUE TO CHRONIC BLOOD LOSS: ICD-10-CM

## 2019-01-22 NOTE — TELEPHONE ENCOUNTER
----- Message from Maureen Murphy sent at 1/22/2019 12:54 PM CST -----  Contact: Self/ [phone]  Patient called in to prescription refill on his vitamins.    Please call.    [id number] - [place], [place] - [address] [facility]

## 2019-01-22 NOTE — TELEPHONE ENCOUNTER
----- Message from Joelle Delgado sent at 1/22/2019 10:00 AM CST -----  Patient is requesting a medication refill.     RX name:  ferrous sulfate 325 mg (65 mg iron) Tab tablet  Strength:   Quantity: 90 day supply  Directions: Take 1 tablet (325 mg total) by mouth once daily. - Oral    Pharmacy name: [facility]      Phone number where patient can be reached:

## 2019-01-23 RX ORDER — FERROUS SULFATE 325(65) MG
1 TABLET ORAL DAILY
Qty: 90 TABLET | Refills: 0 | Status: SHIPPED | OUTPATIENT
Start: 2019-01-23 | End: 2019-02-11 | Stop reason: SDUPTHER

## 2019-01-29 ENCOUNTER — PES CALL (OUTPATIENT)
Dept: ADMINISTRATIVE | Facility: CLINIC | Age: 69
End: 2019-01-29

## 2019-01-29 DIAGNOSIS — I10 ESSENTIAL HYPERTENSION: ICD-10-CM

## 2019-01-29 NOTE — TELEPHONE ENCOUNTER
----- Message from Jany Hitchcock sent at 1/29/2019  3:46 PM CST -----  Contact: [phone]/self  Patient is requesting to have a refill of   metoprolol tartrate (LOPRESSOR) 50 MG tablet. Take 1 tablet (50 mg total) by mouth 2 (two) times daily. - Oral    sent to [id number] [address] . Please advise.

## 2019-01-30 RX ORDER — METOPROLOL TARTRATE 50 MG/1
50 TABLET ORAL 2 TIMES DAILY
Qty: 90 TABLET | Refills: 2 | OUTPATIENT
Start: 2019-01-30

## 2019-02-06 DIAGNOSIS — I10 ESSENTIAL HYPERTENSION: ICD-10-CM

## 2019-02-06 RX ORDER — METOPROLOL TARTRATE 50 MG/1
50 TABLET ORAL 2 TIMES DAILY
Qty: 90 TABLET | Refills: 2 | OUTPATIENT
Start: 2019-02-06

## 2019-02-06 RX ORDER — AMLODIPINE BESYLATE 10 MG/1
10 TABLET ORAL DAILY
Qty: 90 TABLET | Refills: 2 | OUTPATIENT
Start: 2019-02-06

## 2019-02-06 NOTE — TELEPHONE ENCOUNTER
----- Message from Dolly Villela sent at 2/6/2019 10:25 AM CST -----  Contact: self / [phone]  Patient is requesting a call back regarding, his appointment, and heart medication. Please advise

## 2019-02-06 NOTE — TELEPHONE ENCOUNTER
----- Message from Jany Hitchcock sent at 2/6/2019 10:33 AM CST -----  Contact: [phone]/self  Patient is requesting to have a refill of   metoprolol tartrate (LOPRESSOR) 50 MG tablet. Take 1 tablet (50 mg total) by mouth 2 (two) times daily. - Oral     [facility] to [id number] [address] . Please advise.

## 2019-02-06 NOTE — TELEPHONE ENCOUNTER
Called pt back it appears that the pt has a scheduled appt on 2/11 and that I will let Dr Meléndez know that he has that scheduled appt and maybe we can send in the medication but he has to keep that appt. Not answer left message on VM to call office back

## 2019-02-06 NOTE — TELEPHONE ENCOUNTER
Called pt back to inform him that he needs an appt before any refill will be prescribed no answer left message on VM to call office back

## 2019-02-11 ENCOUNTER — OFFICE VISIT (OUTPATIENT)
Dept: FAMILY MEDICINE | Facility: CLINIC | Age: 69
End: 2019-02-11
Payer: MEDICARE

## 2019-02-11 VITALS
OXYGEN SATURATION: 99 % | DIASTOLIC BLOOD PRESSURE: 78 MMHG | TEMPERATURE: 98 F | HEART RATE: 76 BPM | WEIGHT: 156.31 LBS | HEIGHT: 67 IN | SYSTOLIC BLOOD PRESSURE: 144 MMHG | BODY MASS INDEX: 24.53 KG/M2

## 2019-02-11 DIAGNOSIS — Z23 FLU VACCINE NEED: Primary | ICD-10-CM

## 2019-02-11 DIAGNOSIS — D50.0 IRON DEFICIENCY ANEMIA DUE TO CHRONIC BLOOD LOSS: ICD-10-CM

## 2019-02-11 DIAGNOSIS — K21.9 GASTROESOPHAGEAL REFLUX DISEASE, ESOPHAGITIS PRESENCE NOT SPECIFIED: ICD-10-CM

## 2019-02-11 DIAGNOSIS — Z12.9 SCREENING FOR CANCER: ICD-10-CM

## 2019-02-11 DIAGNOSIS — I10 ESSENTIAL HYPERTENSION: ICD-10-CM

## 2019-02-11 DIAGNOSIS — Z12.5 ENCOUNTER FOR SCREENING FOR MALIGNANT NEOPLASM OF PROSTATE: ICD-10-CM

## 2019-02-11 DIAGNOSIS — G47.00 INSOMNIA, UNSPECIFIED TYPE: ICD-10-CM

## 2019-02-11 DIAGNOSIS — F17.210 CIGARETTE NICOTINE DEPENDENCE, UNCOMPLICATED: ICD-10-CM

## 2019-02-11 PROCEDURE — G0008 FLU VACCINE - HIGH DOSE (65+) PRESERVATIVE FREE IM: ICD-10-PCS | Mod: S$GLB,,, | Performed by: FAMILY MEDICINE

## 2019-02-11 PROCEDURE — 90662 FLU VACCINE - HIGH DOSE (65+) PRESERVATIVE FREE IM: ICD-10-PCS | Mod: S$GLB,,, | Performed by: FAMILY MEDICINE

## 2019-02-11 PROCEDURE — 90662 IIV NO PRSV INCREASED AG IM: CPT | Mod: S$GLB,,, | Performed by: FAMILY MEDICINE

## 2019-02-11 PROCEDURE — G0008 ADMIN INFLUENZA VIRUS VAC: HCPCS | Mod: S$GLB,,, | Performed by: FAMILY MEDICINE

## 2019-02-11 PROCEDURE — 3078F DIAST BP <80 MM HG: CPT | Mod: CPTII,S$GLB,, | Performed by: FAMILY MEDICINE

## 2019-02-11 PROCEDURE — 3077F PR MOST RECENT SYSTOLIC BLOOD PRESSURE >= 140 MM HG: ICD-10-PCS | Mod: CPTII,S$GLB,, | Performed by: FAMILY MEDICINE

## 2019-02-11 PROCEDURE — 99499 UNLISTED E&M SERVICE: CPT | Mod: S$GLB,,, | Performed by: FAMILY MEDICINE

## 2019-02-11 PROCEDURE — 99499 RISK ADDL DX/OHS AUDIT: ICD-10-PCS | Mod: S$GLB,,, | Performed by: FAMILY MEDICINE

## 2019-02-11 PROCEDURE — 99397 PER PM REEVAL EST PAT 65+ YR: CPT | Mod: 25,S$GLB,, | Performed by: FAMILY MEDICINE

## 2019-02-11 PROCEDURE — 3078F PR MOST RECENT DIASTOLIC BLOOD PRESSURE < 80 MM HG: ICD-10-PCS | Mod: CPTII,S$GLB,, | Performed by: FAMILY MEDICINE

## 2019-02-11 PROCEDURE — 99397 PR PREVENTIVE VISIT,EST,65 & OVER: ICD-10-PCS | Mod: 25,S$GLB,, | Performed by: FAMILY MEDICINE

## 2019-02-11 PROCEDURE — 3077F SYST BP >= 140 MM HG: CPT | Mod: CPTII,S$GLB,, | Performed by: FAMILY MEDICINE

## 2019-02-11 RX ORDER — MIRTAZAPINE 30 MG/1
30 TABLET, FILM COATED ORAL NIGHTLY
Qty: 30 TABLET | Refills: 11 | Status: SHIPPED | OUTPATIENT
Start: 2019-02-11 | End: 2020-11-10

## 2019-02-11 RX ORDER — METOPROLOL TARTRATE 50 MG/1
50 TABLET ORAL 2 TIMES DAILY
Qty: 180 TABLET | Refills: 2 | Status: SHIPPED | OUTPATIENT
Start: 2019-02-11 | End: 2019-11-12 | Stop reason: SDUPTHER

## 2019-02-11 RX ORDER — MULTIVITAMIN
1 TABLET ORAL DAILY
COMMUNITY
End: 2020-11-10

## 2019-02-11 RX ORDER — PANTOPRAZOLE SODIUM 40 MG/1
40 TABLET, DELAYED RELEASE ORAL DAILY
Qty: 90 TABLET | Refills: 2 | Status: SHIPPED | OUTPATIENT
Start: 2019-02-11 | End: 2019-11-12 | Stop reason: SDUPTHER

## 2019-02-11 RX ORDER — AMLODIPINE BESYLATE 10 MG/1
10 TABLET ORAL DAILY
Qty: 90 TABLET | Refills: 2 | Status: SHIPPED | OUTPATIENT
Start: 2019-02-11 | End: 2019-11-12 | Stop reason: SDUPTHER

## 2019-02-11 RX ORDER — FERROUS SULFATE 325(65) MG
1 TABLET ORAL DAILY
Qty: 90 TABLET | Refills: 0 | Status: SHIPPED | OUTPATIENT
Start: 2019-02-11 | End: 2019-07-18 | Stop reason: SDUPTHER

## 2019-02-11 NOTE — PROGRESS NOTES
Subjective:      Patient ID: Gama Huertas is a 68 y.o. male.    Chief Complaint: Follow-up and Medication Refill      HPI   Here at my request; wants a round pink pill; lives a place do [place];  Left shoulder pain  First visit with me in almost 2 years      Review of Systems   Constitutional: Negative for appetite change, fatigue, fever and unexpected weight change.   HENT: Negative for congestion, ear pain, sinus pressure and sore throat.    Eyes: Negative for pain and visual disturbance.   Respiratory: Negative for shortness of breath.    Cardiovascular: Negative for chest pain.   Gastrointestinal: Negative for abdominal pain, constipation and diarrhea.   Endocrine: Negative for polyuria.   Genitourinary: Negative for difficulty urinating and frequency.   Musculoskeletal: Positive for arthralgias. Negative for back pain and myalgias.        Left shjoulder pain   Skin: Negative for color change.   Allergic/Immunologic: Negative.    Neurological: Positive for headaches. Negative for syncope and weakness.   Hematological: Does not bruise/bleed easily.   Psychiatric/Behavioral: Positive for sleep disturbance. Negative for dysphoric mood and suicidal ideas. The patient is not nervous/anxious.    All other systems reviewed and are negative.    Objective:     Physical Exam   Constitutional: He is oriented to person, place, and time. He appears well-developed and well-nourished. No distress.   HENT:   Head: Normocephalic and atraumatic.   Right Ear: External ear normal.   Left Ear: External ear normal.   Mouth/Throat: Oropharynx is clear and moist. No oropharyngeal exudate.   Eyes: Conjunctivae and EOM are normal. Pupils are equal, round, and reactive to light. Right eye exhibits no discharge. Left eye exhibits no discharge. No scleral icterus.   Neck: Normal range of motion. Neck supple. No JVD present. No tracheal deviation present. No thyromegaly present.   Cardiovascular: Normal rate and regular rhythm. Exam  reveals no gallop and no friction rub.   No murmur heard.  Pulmonary/Chest: Effort normal and breath sounds normal. No stridor. No respiratory distress. He has no wheezes. He has no rales. He exhibits no tenderness.   Abdominal: Soft. He exhibits no distension and no mass. There is no tenderness. There is no rebound and no guarding.   Musculoskeletal: Normal range of motion. He exhibits no edema or tenderness.   Lymphadenopathy:     He has no cervical adenopathy.   Neurological: He is alert and oriented to person, place, and time. He has normal reflexes. He displays normal reflexes. No cranial nerve deficit. He exhibits normal muscle tone. Coordination normal.   Skin: Skin is warm and dry. No rash noted. He is not diaphoretic. No erythema. No pallor.   Psychiatric: He has a normal mood and affect. His behavior is normal. Judgment and thought content normal.   Nursing note and vitals reviewed.    Assessment:     1. Flu vaccine need    2. Essential hypertension    3. Iron deficiency anemia due to chronic blood loss    4. Gastroesophageal reflux disease, esophagitis presence not specified    5. Insomnia, unspecified type    6. Screening for cancer    7. Cigarette nicotine dependence, uncomplicated     8. Encounter for screening for malignant neoplasm of prostate       Plan:        Medication List           Accurate as of 2/11/19 11:58 AM. If you have any questions, ask your nurse or doctor.               START taking these medications    mirtazapine 30 MG tablet  Commonly known as:  REMERON  Take 1 tablet (30 mg total) by mouth every evening. For sleep  Started by:  Richard Meléndez MD        CONTINUE taking these medications    ALPRAZolam 0.25 MG tablet  Commonly known as:  XANAX  TAKE 1 TABLET BY MOUTH EVERY DAY AS NEEDED     amLODIPine 10 MG tablet  Commonly known as:  NORVASC  Take 1 tablet (10 mg total) by mouth once daily.     ferrous sulfate 325 mg (65 mg iron) Tab tablet  Commonly known as:  FEOSOL  Take 1  tablet (325 mg total) by mouth once daily.     metoprolol tartrate 50 MG tablet  Commonly known as:  LOPRESSOR  Take 1 tablet (50 mg total) by mouth 2 (two) times daily.     multivitamin per tablet  Commonly known as:  THERAGRAN     pantoprazole 40 MG tablet  Commonly known as:  PROTONIX  Take 1 tablet (40 mg total) by mouth once daily.           Where to Get Your Medications      These medications [id number] [place] [facility] [address] [address]    Phone:  [phone]   · amLODIPine 10 MG tablet  · ferrous sulfate 325 mg (65 mg iron) Tab tablet  · metoprolol tartrate 50 MG tablet  · mirtazapine 30 MG tablet  · pantoprazole 40 MG tablet       Flu vaccine need  -     Influenza - High Dose (65+) (PF) (IM)  -     Comprehensive metabolic panel; Future; Expected date: 02/11/2019  -     Lipid panel; Future  -     PSA, Screening; Future    Essential hypertension  -     metoprolol tartrate (LOPRESSOR) 50 MG tablet; Take 1 tablet (50 mg total) by mouth 2 (two) times daily.  Dispense: 180 tablet; Refill: 2  -     amLODIPine (NORVASC) 10 MG tablet; Take 1 tablet (10 mg total) by mouth once daily.  Dispense: 90 tablet; Refill: 2  -     Comprehensive metabolic panel; Future; Expected date: 02/11/2019  -     Lipid panel; Future  -     PSA, Screening; Future    Iron deficiency anemia due to chronic blood loss  -     ferrous sulfate (FEOSOL) 325 mg (65 mg iron) Tab tablet; Take 1 tablet (325 mg total) by mouth once daily.  Dispense: 90 tablet; Refill: 0  -     Comprehensive metabolic panel; Future; Expected date: 02/11/2019  -     Lipid panel; Future  -     PSA, Screening; Future    Gastroesophageal reflux disease, esophagitis presence not specified  -     pantoprazole (PROTONIX) 40 MG tablet; Take 1 tablet (40 mg total) by mouth once daily.  Dispense: 90 tablet; Refill: 2  -     Comprehensive metabolic panel; Future; Expected date: 02/11/2019  -     Lipid panel;  Future  -     PSA, Screening; Future    Insomnia, unspecified type  -     Comprehensive metabolic panel; Future; Expected date: 02/11/2019  -     Lipid panel; Future  -     PSA, Screening; Future    Screening for cancer  -     CT Chest Lung Screening Low Dose; Future; Expected date: 02/11/2019  -     Comprehensive metabolic panel; Future; Expected date: 02/11/2019  -     Lipid panel; Future  -     PSA, Screening; Future    Cigarette nicotine dependence, uncomplicated   -     CT Chest Lung Screening Low Dose; Future; Expected date: 02/11/2019  -     Comprehensive metabolic panel; Future; Expected date: 02/11/2019  -     Lipid panel; Future  -     PSA, Screening; Future    Encounter for screening for malignant neoplasm of prostate   -     PSA, Screening; Future    Other orders  -     mirtazapine (REMERON) 30 MG tablet; Take 1 tablet (30 mg total) by mouth every evening. For sleep  Dispense: 30 tablet; Refill: 11      Screen CT smokes  Refill meds  Needs some labs

## 2019-02-20 ENCOUNTER — HOSPITAL ENCOUNTER (OUTPATIENT)
Dept: RADIOLOGY | Facility: HOSPITAL | Age: 69
Discharge: HOME | End: 2019-02-20
Attending: FAMILY MEDICINE
Payer: MEDICARE

## 2019-02-20 DIAGNOSIS — F17.210 CIGARETTE NICOTINE DEPENDENCE, UNCOMPLICATED: ICD-10-CM

## 2019-02-20 DIAGNOSIS — Z12.9 SCREENING FOR CANCER: ICD-10-CM

## 2019-02-20 PROCEDURE — G0297 LDCT FOR LUNG CA SCREEN: HCPCS | Mod: TC,PO

## 2019-02-23 ENCOUNTER — TELEPHONE (OUTPATIENT)
Dept: FAMILY MEDICINE | Facility: CLINIC | Age: 69
End: 2019-02-23

## 2019-02-23 DIAGNOSIS — R97.20 PSA ELEVATION: Primary | ICD-10-CM

## 2019-02-27 ENCOUNTER — TELEPHONE (OUTPATIENT)
Dept: FAMILY MEDICINE | Facility: CLINIC | Age: 69
End: 2019-02-27

## 2019-02-27 NOTE — TELEPHONE ENCOUNTER
Left message for pt to call us back.     ----- Message from Richard Meléndez MD sent at 2/23/2019  6:22 PM CST -----  PSA elevated; come in to office to see me for a prostate exam.    Result Notes for CT Chest Lung Screening Low Dose     Notes recorded by Richard Meléndez MD on 2/23/2019 at 6:57 PM CST  CALL PT TESTS ARE NORMAL

## 2019-02-28 NOTE — TELEPHONE ENCOUNTER
I LM for the pt to rtn call to discuss results per dr walter  pSA elevated; come in to office to see me for a prostate exam.  Result Notes for CT Chest Lung Screening Low Dose

## 2019-03-06 ENCOUNTER — TELEPHONE (OUTPATIENT)
Dept: FAMILY MEDICINE | Facility: CLINIC | Age: 69
End: 2019-03-06

## 2019-03-06 NOTE — TELEPHONE ENCOUNTER
----- Message from Jany Hitchcock sent at 3/6/2019  2:44 PM CST -----  Contact: [phone] /self  Patient is requesting a call back regarding test results. Tell Dr. Meléndez his friend Alisa passed away. Thanks

## 2019-03-12 ENCOUNTER — TELEPHONE (OUTPATIENT)
Dept: FAMILY MEDICINE | Facility: CLINIC | Age: 69
End: 2019-03-12

## 2019-03-12 NOTE — TELEPHONE ENCOUNTER
Pt notified via VM.     ----- Message from Richard Meléndez MD sent at 3/12/2019  7:58 AM CDT -----  CALL PT TESTS ARE NORMAL -- CT of Lungs.

## 2019-04-11 ENCOUNTER — TELEPHONE (OUTPATIENT)
Dept: FAMILY MEDICINE | Facility: CLINIC | Age: 69
End: 2019-04-11

## 2019-04-11 ENCOUNTER — OFFICE VISIT (OUTPATIENT)
Dept: FAMILY MEDICINE | Facility: CLINIC | Age: 69
End: 2019-04-11
Payer: MEDICARE

## 2019-04-11 VITALS
OXYGEN SATURATION: 96 % | HEART RATE: 67 BPM | HEIGHT: 66 IN | SYSTOLIC BLOOD PRESSURE: 136 MMHG | WEIGHT: 162.5 LBS | TEMPERATURE: 99 F | DIASTOLIC BLOOD PRESSURE: 74 MMHG | BODY MASS INDEX: 26.12 KG/M2

## 2019-04-11 DIAGNOSIS — R97.20 ELEVATED PSA: Primary | ICD-10-CM

## 2019-04-11 DIAGNOSIS — F17.200 SMOKER: ICD-10-CM

## 2019-04-11 DIAGNOSIS — I10 ESSENTIAL HYPERTENSION: ICD-10-CM

## 2019-04-11 PROCEDURE — 99499 UNLISTED E&M SERVICE: CPT | Mod: S$GLB,,, | Performed by: FAMILY MEDICINE

## 2019-04-11 PROCEDURE — 1101F PT FALLS ASSESS-DOCD LE1/YR: CPT | Mod: CPTII,S$GLB,, | Performed by: FAMILY MEDICINE

## 2019-04-11 PROCEDURE — 1101F PR PT FALLS ASSESS DOC 0-1 FALLS W/OUT INJ PAST YR: ICD-10-PCS | Mod: CPTII,S$GLB,, | Performed by: FAMILY MEDICINE

## 2019-04-11 PROCEDURE — 99213 OFFICE O/P EST LOW 20 MIN: CPT | Mod: S$GLB,,, | Performed by: FAMILY MEDICINE

## 2019-04-11 PROCEDURE — 3075F PR MOST RECENT SYSTOLIC BLOOD PRESS GE 130-139MM HG: ICD-10-PCS | Mod: CPTII,S$GLB,, | Performed by: FAMILY MEDICINE

## 2019-04-11 PROCEDURE — 3078F DIAST BP <80 MM HG: CPT | Mod: CPTII,S$GLB,, | Performed by: FAMILY MEDICINE

## 2019-04-11 PROCEDURE — 99213 PR OFFICE/OUTPT VISIT, EST, LEVL III, 20-29 MIN: ICD-10-PCS | Mod: S$GLB,,, | Performed by: FAMILY MEDICINE

## 2019-04-11 PROCEDURE — 99499 RISK ADDL DX/OHS AUDIT: ICD-10-PCS | Mod: S$GLB,,, | Performed by: FAMILY MEDICINE

## 2019-04-11 PROCEDURE — 3078F PR MOST RECENT DIASTOLIC BLOOD PRESSURE < 80 MM HG: ICD-10-PCS | Mod: CPTII,S$GLB,, | Performed by: FAMILY MEDICINE

## 2019-04-11 PROCEDURE — 3075F SYST BP GE 130 - 139MM HG: CPT | Mod: CPTII,S$GLB,, | Performed by: FAMILY MEDICINE

## 2019-04-11 NOTE — PROGRESS NOTES
Chief Complaint  Chief Complaint   Patient presents with    Follow-up     lab results     Arm Pain     lt arm        HPI  Gama Huertas is a 68 y.o. male with multiple medical diagnoses as listed in the medical history and problem list that presents for for lab review.  His labs are normal except for an elevated PSA.  He denies  Urinary frequency, hesitancy hematuria or incomplete bladder emptying.  No fevers or pain.      Hypertension:  Blood pressure is well controlled.  Tolerates his medication will.  No dizziness or palpitations.     PAST MEDICAL HISTORY:  Past Medical History:   Diagnosis Date    Gastric ulcer     Hypertension        PAST SURGICAL HISTORY:  Past Surgical History:   Procedure Laterality Date    COLONOSCOPY  3/11/14     repeat in 3 years dr montgomery       SOCIAL HISTORY:  Social History     Socioeconomic History    Marital status: Single     Spouse name: Not on file    Number of children: Not on file    Years of education: Not on file    Highest education level: Not on file   Occupational History    Not on file   Social Needs    Financial resource strain: Not on file    Food insecurity:     Worry: Not on file     Inability: Not on file    Transportation needs:     Medical: Not on file     Non-medical: Not on file   Tobacco Use    Smoking status: Current Every Day Smoker    Smokeless tobacco: Never Used   Substance and Sexual Activity    Alcohol use: Yes    Drug use: Not on file    Sexual activity: Not on file   Lifestyle    Physical activity:     Days per week: Not on file     Minutes per session: Not on file    Stress: Not on file   Relationships    Social connections:     Talks on phone: Not on file     Gets together: Not on file     Attends Sikhism service: Not on file     Active member of club or organization: Not on file     Attends meetings of clubs or organizations: Not on file     Relationship status: Not on file   Other Topics Concern    Not on file   Social  History Narrative    Not on file       FAMILY HISTORY:  Family History   Problem Relation Age of Onset    Heart disease Mother     Stomach cancer Father     Kidney failure Father     Cancer Father         leukemia       ALLERGIES AND MEDICATIONS: updated and reviewed.  Review of patient's allergies indicates:  No Known Allergies  Current Outpatient Medications   Medication Sig Dispense Refill    ALPRAZolam (XANAX) 0.25 MG tablet TAKE 1 TABLET BY MOUTH EVERY DAY AS NEEDED 30 tablet 0    amLODIPine (NORVASC) 10 MG tablet Take 1 tablet (10 mg total) by mouth once daily. 90 tablet 2    ferrous sulfate (FEOSOL) 325 mg (65 mg iron) Tab tablet Take 1 tablet (325 mg total) by mouth once daily. 90 tablet 0    metoprolol tartrate (LOPRESSOR) 50 MG tablet Take 1 tablet (50 mg total) by mouth 2 (two) times daily. 180 tablet 2    mirtazapine (REMERON) 30 MG tablet Take 1 tablet (30 mg total) by mouth every evening. For sleep 30 tablet 11    multivitamin (THERAGRAN) per tablet Take 1 tablet by mouth once daily.      pantoprazole (PROTONIX) 40 MG tablet Take 1 tablet (40 mg total) by mouth once daily. 90 tablet 2     No current facility-administered medications for this visit.          ROS  Review of Systems   Constitutional: Negative for activity change, appetite change, chills and fatigue.   HENT: Negative for congestion, ear discharge, hearing loss, mouth sores, rhinorrhea, sinus pain and trouble swallowing.    Eyes: Negative for photophobia, pain, discharge and visual disturbance.   Respiratory: Negative for cough, chest tightness, shortness of breath and wheezing.    Cardiovascular: Negative for chest pain, palpitations and leg swelling.   Gastrointestinal: Negative for abdominal distention, abdominal pain, blood in stool, constipation, diarrhea, nausea and vomiting.   Genitourinary: Negative for difficulty urinating, dysuria and flank pain.   Musculoskeletal: Negative for arthralgias, back pain, gait problem,  "joint swelling and myalgias.   Skin: Negative for color change and rash.   Neurological: Negative for dizziness, tremors, speech difficulty, light-headedness, numbness and headaches.   Psychiatric/Behavioral: Negative for behavioral problems, confusion, hallucinations, sleep disturbance and suicidal ideas.           PHYSICAL EXAM  Vitals:    04/11/19 1015   BP: 136/74   BP Location: Left arm   Patient Position: Sitting   Pulse: 67   Temp: 98.6 °F (37 °C)   TempSrc: Oral   SpO2: 96%   Weight: 73.7 kg (162 lb 7.7 oz)   Height: 5' 6" (1.676 m)    Body mass index is 26.22 kg/m².  Weight: 73.7 kg (162 lb 7.7 oz)   Height: 5' 6" (167.6 cm)     Physical Exam   Constitutional: He appears well-developed.   HENT:   Head: Normocephalic.   Eyes: Conjunctivae are normal.   Neck: Normal range of motion. Neck supple.   Cardiovascular: Normal rate and regular rhythm.   Pulmonary/Chest: Effort normal and breath sounds normal.   Neurological: He is alert.   Skin: Skin is warm.   Psychiatric: He has a normal mood and affect.   Nursing note and vitals reviewed.    No visits with results within 2 Week(s) from this visit.   Latest known visit with results is:   Lab Visit on 02/20/2019   Component Date Value Ref Range Status    Sodium 02/20/2019 141  136 - 145 mmol/L Final    Potassium 02/20/2019 5.3* 3.5 - 5.1 mmol/L Final    Chloride 02/20/2019 103  95 - 110 mmol/L Final    CO2 02/20/2019 31* 23 - 29 mmol/L Final    Glucose 02/20/2019 91  70 - 110 mg/dL Final    BUN, Bld 02/20/2019 20  2 - 20 mg/dL Final    Creatinine 02/20/2019 1.06  0.50 - 1.40 mg/dL Final    Calcium 02/20/2019 9.6  8.7 - 10.5 mg/dL Final    Total Protein 02/20/2019 7.9  6.0 - 8.4 g/dL Final    Albumin 02/20/2019 4.3  3.5 - 5.2 g/dL Final    Total Bilirubin 02/20/2019 0.5  0.1 - 1.0 mg/dL Final    Comment: For infants and newborns, interpretation of results should be based  on gestational age, weight and in agreement with clinical  observations.  Premature " Infant recommended reference ranges:  Up to 24 hours.............<8.0 mg/dL  Up to 48 hours............<12.0 mg/dL  3-5 days..................<15.0 mg/dL  6-29 days.................<15.0 mg/dL      Alkaline Phosphatase 02/20/2019 102  38 - 126 U/L Final    AST 02/20/2019 42  15 - 46 U/L Final    ALT 02/20/2019 29  10 - 44 U/L Final    Anion Gap 02/20/2019 7* 8 - 16 mmol/L Final    eGFR if African American 02/20/2019 >60.0  >60 mL/min/1.73 m^2 Final    eGFR if non African American 02/20/2019 >60.0  >60 mL/min/1.73 m^2 Final    Comment: Calculation used to obtain the estimated glomerular filtration  rate (eGFR) is the CKD-EPI equation.       Cholesterol 02/20/2019 198  120 - 199 mg/dL Final    Comment: The National Cholesterol Education Program (NCEP) has set the  following guidelines (reference ranges) for Cholesterol:  Optimal.....................<200 mg/dL  Borderline High.............200-239 mg/dL  High........................> or = 240 mg/dL      Triglycerides 02/20/2019 126  30 - 150 mg/dL Final    Comment: The National Cholesterol Education Program (NCEP) has set the  following guidelines (reference values) for triglycerides:  Normal......................<150 mg/dL  Borderline High.............150-199 mg/dL  High........................200-499 mg/dL      HDL 02/20/2019 41  40 - 75 mg/dL Final    Comment: The National Cholesterol Education Program (NCEP) has set the  following guidelines (reference values) for HDL Cholesterol:  Low...............<40 mg/dL  Optimal...........>60 mg/dL      LDL Cholesterol 02/20/2019 131.8  63.0 - 159.0 mg/dL Final    Comment: The National Cholesterol Education Program (NCEP) has set the  following guidelines (reference values) for LDL Cholesterol:  Optimal.......................<130 mg/dL  Borderline High...............130-159 mg/dL  High..........................160-189 mg/dL  Very High.....................>190 mg/dL      HDL/Chol Ratio 02/20/2019 20.7  20.0 - 50.0 %  Final    Total Cholesterol/HDL Ratio 02/20/2019 4.8  2.0 - 5.0 Final    Non-HDL Cholesterol 02/20/2019 157  mg/dL Final    Comment: Risk category and Non-HDL cholesterol goals:  Coronary heart disease (CHD)or equivalent (10-year risk of CHD >20%):  Non-HDL cholesterol goal     <130 mg/dL  Two or more CHD risk factors and 10-year risk of CHD <= 20%:  Non-HDL cholesterol goal     <160 mg/dL  0 to 1 CHD risk factor:  Non-HDL cholesterol goal     <190 mg/dL      PSA, SCREEN 02/20/2019 4.9* 0.00 - 4.00 ng/mL Final    Comment: PSA Expected levels:  Hormonal Therapy: <0.05 ng/ml  Prostatectomy: <0.01 ng/ml  Radiation Therapy: <1.00 ng/ml           Health Maintenance       Date Due Completion Date    Lipid Panel 02/20/2024 2/20/2019    Colonoscopy 03/11/2024 3/11/2014 (N/S)    Override on 3/11/2014: (N/S) (dr montgomery repeat in 3 years)    TETANUS VACCINE 08/11/2026 8/11/2016 (Done)    Override on 8/11/2016: Done            Assessment & Plan      Gama was seen today for follow-up and arm pain.    Diagnoses and all orders for this visit:    Elevated PSA  -     Ambulatory referral to Urology  - Discussed monitoring this to see if it returns to normal, but patient is anxious about it and prefers to see a specialist.     Essential hypertension   - Controlled.  Continue current medication.   Smoker     - reviewed CT scan with patient.       Follow-up: No follow-ups on file.

## 2019-04-11 NOTE — TELEPHONE ENCOUNTER
Done.  Pt notified.     ----- Message from Kwikpik Cadence sent at 4/11/2019  1:15 PM CDT -----  Contact: [phone]/self  Patient would like to know if you can mail him a copy of his after visit  summary from today's visit.

## 2019-05-08 ENCOUNTER — TELEPHONE (OUTPATIENT)
Dept: FAMILY MEDICINE | Facility: CLINIC | Age: 69
End: 2019-05-08

## 2019-05-08 RX ORDER — CYCLOBENZAPRINE HCL 5 MG
5 TABLET ORAL 3 TIMES DAILY PRN
Qty: 20 TABLET | Refills: 0 | Status: SHIPPED | OUTPATIENT
Start: 2019-05-08 | End: 2019-05-18

## 2019-05-08 NOTE — TELEPHONE ENCOUNTER
"Pt stated that he's having back pain that radiates from mid to lower back that began a few days ago. He stated that he's having difficulty walking (while standing straight up). 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 Pt has not taken any OTC medications as of yet. Pt denies trauma/falls.  "

## 2019-05-08 NOTE — TELEPHONE ENCOUNTER
----- Message from Andreea Hunt sent at 5/8/2019 12:30 PM CDT -----  Contact: self, [phone] (M)  Patient requests to speak with you about getting medication prescribed for pain in his upper back to buttocks. States pharmacy is [facility].

## 2019-05-24 RX ORDER — CYCLOBENZAPRINE HCL 5 MG
5 TABLET ORAL 3 TIMES DAILY PRN
Qty: 20 TABLET | Refills: 0 | Status: SHIPPED | OUTPATIENT
Start: 2019-05-24 | End: 2019-06-03

## 2019-07-18 DIAGNOSIS — D50.0 IRON DEFICIENCY ANEMIA DUE TO CHRONIC BLOOD LOSS: ICD-10-CM

## 2019-07-19 RX ORDER — FERROUS SULFATE 325(65) MG
TABLET ORAL
Qty: 90 TABLET | Refills: 0 | Status: SHIPPED | OUTPATIENT
Start: 2019-07-19 | End: 2019-11-01 | Stop reason: SDUPTHER

## 2019-11-01 DIAGNOSIS — D50.0 IRON DEFICIENCY ANEMIA DUE TO CHRONIC BLOOD LOSS: ICD-10-CM

## 2019-11-02 RX ORDER — FERROUS SULFATE 325(65) MG
TABLET ORAL
Qty: 90 TABLET | Refills: 0 | Status: SHIPPED | OUTPATIENT
Start: 2019-11-02 | End: 2020-04-21

## 2019-11-12 DIAGNOSIS — I10 ESSENTIAL HYPERTENSION: ICD-10-CM

## 2019-11-12 DIAGNOSIS — K21.9 GASTROESOPHAGEAL REFLUX DISEASE, ESOPHAGITIS PRESENCE NOT SPECIFIED: ICD-10-CM

## 2019-11-13 RX ORDER — AMLODIPINE BESYLATE 10 MG/1
TABLET ORAL
Qty: 90 TABLET | Refills: 0 | Status: SHIPPED | OUTPATIENT
Start: 2019-11-13 | End: 2020-06-27

## 2019-11-13 RX ORDER — METOPROLOL TARTRATE 50 MG/1
TABLET ORAL
Qty: 180 TABLET | Refills: 0 | Status: SHIPPED | OUTPATIENT
Start: 2019-11-13 | End: 2020-03-22

## 2019-11-13 RX ORDER — PANTOPRAZOLE SODIUM 40 MG/1
TABLET, DELAYED RELEASE ORAL
Qty: 90 TABLET | Refills: 0 | Status: SHIPPED | OUTPATIENT
Start: 2019-11-13 | End: 2020-08-23

## 2020-03-20 ENCOUNTER — HOSPITAL ENCOUNTER (EMERGENCY)
Facility: HOSPITAL | Age: 70
Discharge: HOME | End: 2020-03-20
Attending: EMERGENCY MEDICINE
Payer: MEDICARE

## 2020-03-20 VITALS
HEART RATE: 58 BPM | RESPIRATION RATE: 16 BRPM | OXYGEN SATURATION: 98 % | BODY MASS INDEX: 24.25 KG/M2 | DIASTOLIC BLOOD PRESSURE: 81 MMHG | TEMPERATURE: 99 F | HEIGHT: 68 IN | WEIGHT: 160 LBS | SYSTOLIC BLOOD PRESSURE: 167 MMHG

## 2020-03-20 DIAGNOSIS — G51.0 BELL'S PALSY: Primary | ICD-10-CM

## 2020-03-20 DIAGNOSIS — R29.818 NEUROLOGICAL DEFICIT PRESENT: ICD-10-CM

## 2020-03-20 LAB
ALBUMIN SERPL BCP-MCNC: 4.4 G/DL (ref 3.5–5.2)
ALP SERPL-CCNC: 106 U/L (ref 38–126)
ALT SERPL W/O P-5'-P-CCNC: 12 U/L (ref 10–44)
ANION GAP SERPL CALC-SCNC: 11 MMOL/L (ref 8–16)
AST SERPL-CCNC: 26 U/L (ref 15–46)
BASOPHILS # BLD AUTO: 0.09 K/UL (ref 0–0.2)
BASOPHILS NFR BLD: 1.2 % (ref 0–1.9)
BILIRUB SERPL-MCNC: 0.4 MG/DL (ref 0.1–1)
BUN SERPL-MCNC: 20 MG/DL (ref 2–20)
CALCIUM SERPL-MCNC: 9.2 MG/DL (ref 8.7–10.5)
CHLORIDE SERPL-SCNC: 109 MMOL/L (ref 95–110)
CO2 SERPL-SCNC: 20 MMOL/L (ref 23–29)
CREAT SERPL-MCNC: 1 MG/DL (ref 0.5–1.4)
DIFFERENTIAL METHOD: ABNORMAL
EOSINOPHIL # BLD AUTO: 0.2 K/UL (ref 0–0.5)
EOSINOPHIL NFR BLD: 2.6 % (ref 0–8)
ERYTHROCYTE [DISTWIDTH] IN BLOOD BY AUTOMATED COUNT: 14.6 % (ref 11.5–14.5)
EST. GFR  (AFRICAN AMERICAN): >60 ML/MIN/1.73 M^2
EST. GFR  (NON AFRICAN AMERICAN): >60 ML/MIN/1.73 M^2
GLUCOSE SERPL-MCNC: 112 MG/DL (ref 70–110)
HCT VFR BLD AUTO: 48.5 % (ref 40–54)
HGB BLD-MCNC: 15 G/DL (ref 14–18)
IMM GRANULOCYTES # BLD AUTO: 0.02 K/UL (ref 0–0.04)
IMM GRANULOCYTES NFR BLD AUTO: 0.3 % (ref 0–0.5)
LYMPHOCYTES # BLD AUTO: 3 K/UL (ref 1–4.8)
LYMPHOCYTES NFR BLD: 40.4 % (ref 18–48)
MCH RBC QN AUTO: 26.3 PG (ref 27–31)
MCHC RBC AUTO-ENTMCNC: 30.9 G/DL (ref 32–36)
MCV RBC AUTO: 85 FL (ref 82–98)
MONOCYTES # BLD AUTO: 0.7 K/UL (ref 0.3–1)
MONOCYTES NFR BLD: 9.3 % (ref 4–15)
NEUTROPHILS # BLD AUTO: 3.4 K/UL (ref 1.8–7.7)
NEUTROPHILS NFR BLD: 46.2 % (ref 38–73)
NRBC BLD-RTO: 0 /100 WBC
PLATELET # BLD AUTO: 299 K/UL (ref 150–350)
PMV BLD AUTO: 9.4 FL (ref 9.2–12.9)
POCT GLUCOSE: 100 MG/DL (ref 70–110)
POTASSIUM SERPL-SCNC: 4.2 MMOL/L (ref 3.5–5.1)
PROT SERPL-MCNC: 8.2 G/DL (ref 6–8.4)
RBC # BLD AUTO: 5.71 M/UL (ref 4.6–6.2)
SODIUM SERPL-SCNC: 140 MMOL/L (ref 136–145)
WBC # BLD AUTO: 7.41 K/UL (ref 3.9–12.7)

## 2020-03-20 PROCEDURE — 85025 COMPLETE CBC W/AUTO DIFF WBC: CPT | Mod: ER

## 2020-03-20 PROCEDURE — 99285 EMERGENCY DEPT VISIT HI MDM: CPT | Mod: 25,ER

## 2020-03-20 PROCEDURE — 80053 COMPREHEN METABOLIC PANEL: CPT | Mod: ER

## 2020-03-20 PROCEDURE — 93010 EKG 12-LEAD: ICD-10-PCS | Mod: ,,, | Performed by: INTERNAL MEDICINE

## 2020-03-20 PROCEDURE — 82962 GLUCOSE BLOOD TEST: CPT | Mod: ER

## 2020-03-20 PROCEDURE — 93010 ELECTROCARDIOGRAM REPORT: CPT | Mod: ,,, | Performed by: INTERNAL MEDICINE

## 2020-03-20 PROCEDURE — 93005 ELECTROCARDIOGRAM TRACING: CPT | Mod: ER

## 2020-03-20 RX ORDER — PREDNISONE 20 MG/1
40 TABLET ORAL 2 TIMES DAILY
Qty: 10 TABLET | Refills: 0 | Status: SHIPPED | OUTPATIENT
Start: 2020-03-20 | End: 2020-03-27

## 2020-03-20 RX ORDER — VALACYCLOVIR HYDROCHLORIDE 1 G/1
1000 TABLET, FILM COATED ORAL 3 TIMES DAILY
Qty: 21 TABLET | Refills: 0 | Status: SHIPPED | OUTPATIENT
Start: 2020-03-20 | End: 2020-03-20 | Stop reason: SDUPTHER

## 2020-03-20 RX ORDER — PREDNISONE 20 MG/1
40 TABLET ORAL 2 TIMES DAILY
Qty: 10 TABLET | Refills: 0 | Status: SHIPPED | OUTPATIENT
Start: 2020-03-20 | End: 2020-03-20 | Stop reason: SDUPTHER

## 2020-03-20 RX ORDER — VALACYCLOVIR HYDROCHLORIDE 1 G/1
1000 TABLET, FILM COATED ORAL 3 TIMES DAILY
Qty: 21 TABLET | Refills: 0 | Status: SHIPPED | OUTPATIENT
Start: 2020-03-20 | End: 2020-04-28 | Stop reason: SDUPTHER

## 2020-03-21 DIAGNOSIS — I10 ESSENTIAL HYPERTENSION: ICD-10-CM

## 2020-03-21 NOTE — ED PROVIDER NOTES
"Encounter Date: 3/20/2020       History     Chief Complaint   Patient presents with    Facial Droop     Pt states "I woke up with my face like this, I put an alcohol compress on it." Pt states lip drooping started today. MD at bedside. Left sided facial droop noted, states more irritated.      Patient presents with concern regarding LEFT-sided facial droop.  This onset about 3 three days ago with gradual progression.  Patient notes associated dry eye on the LEFT as well as drooling from that corner of his mouth.  He denies difficulty swallowing.  No associated head or neck pain.        Review of patient's allergies indicates:  No Known Allergies  Past Medical History:   Diagnosis Date    Gastric ulcer     Hypertension      Past Surgical History:   Procedure Laterality Date    COLONOSCOPY  3/11/14     repeat in 3 years dr montgomery     Family History   Problem Relation Age of Onset    Heart disease Mother     Stomach cancer Father     Kidney failure Father     Cancer Father         leukemia     Social History     Tobacco Use    Smoking status: Current Every Day Smoker     Packs/day: 0.50    Smokeless tobacco: Never Used   Substance Use Topics    Alcohol use: Yes    Drug use: Not on file     Review of Systems   Constitutional: Negative for chills and fever.   HENT: Negative for congestion.    Respiratory: Negative for chest tightness and shortness of breath.    Cardiovascular: Negative for chest pain and leg swelling.   Gastrointestinal: Negative for abdominal pain, constipation, diarrhea, nausea and vomiting.   Genitourinary: Negative for dysuria, frequency and urgency.   Skin: Negative for color change and rash.   Allergic/Immunologic: Negative for immunocompromised state.   Neurological: Negative for weakness and numbness.   Hematological: Negative for adenopathy. Does not bruise/bleed easily.   All other systems reviewed and are negative.    Physical Exam     Initial Vitals   BP Pulse Resp Temp SpO2 " "  03/20/20 1928 03/20/20 1928 03/20/20 2002 03/20/20 1928 03/20/20 1928   (!) 186/88 71 20 99 °F (37.2 °C) 98 %      MAP       --                Vitals:    03/20/20 1928 03/20/20 2002 03/20/20 2216   BP: (!) 186/88 (!) 154/88 (!) 167/81   Pulse: 71  (!) 58   Resp:  20 16   Temp: 99 °F (37.2 °C)     TempSrc: Oral     SpO2: 98%  98%   Weight: 72.6 kg (160 lb)     Height: 5' 8" (1.727 m)       Physical Exam    Constitutional: He appears well-developed and well-nourished. He is not diaphoretic. No distress.   HENT:   Head: Normocephalic and atraumatic.   Right Ear: External ear normal.   Left Ear: External ear normal.   Nose: Nose normal.   Mouth/Throat: Oropharynx is clear and moist.   Eyes: EOM are normal. Pupils are equal, round, and reactive to light. Left conjunctiva is injected.   Cardiovascular: Normal rate, regular rhythm, normal heart sounds and intact distal pulses.   Pulmonary/Chest: Breath sounds normal. No respiratory distress.   Neurological: He is alert and oriented to person, place, and time. He has normal strength. GCS eye subscore is 4. GCS verbal subscore is 5. GCS motor subscore is 6.   LEFT-sided forehead paralysis  LEFT-sided eyelid drooping with inability to close the eye  LEFT-sided lip droop  CN otherwise intact   Skin: Skin is warm and dry.   Psychiatric: He has a normal mood and affect. His behavior is normal.         ED Course   Procedures  Labs Reviewed   CBC W/ AUTO DIFFERENTIAL - Abnormal; Notable for the following components:       Result Value    Mean Corpuscular Hemoglobin 26.3 (*)     Mean Corpuscular Hemoglobin Conc 30.9 (*)     RDW 14.6 (*)     All other components within normal limits   COMPREHENSIVE METABOLIC PANEL - Abnormal; Notable for the following components:    CO2 20 (*)     Glucose 112 (*)     All other components within normal limits   POCT GLUCOSE     EKG Readings: (Independently Interpreted)   Initial Reading: No STEMI. Rhythm: Normal Sinus Rhythm. Heart Rate: 61. " Ectopy: No Ectopy. Conduction: Normal. Axis: Left Axis Deviation.       Imaging Results          CT Head Without Contrast (Final result)  Result time 03/20/20 19:57:15    Final result by Rey Sanz III, MD (03/20/20 19:57:15)                 Impression:      No acute intracranial disease identified.  Chronic microvascular ischemic change.    The above findings were discussed with Dr. Davalos by phone on 03/20/2020 at 19:54.      Electronically signed by: Rey Sanz MD  Date:    03/20/2020  Time:    19:57             Narrative:    EXAMINATION:  CT HEAD WITHOUT CONTRAST    CLINICAL HISTORY:  Focal neuro deficit, new, fixed or worsening, >6 hours;    TECHNIQUE:  Routine noncontrast axial head CT. All CT scans at this facility are performed  using dose modulation techniques as appropriate to performed exam including the following:  automated exposure control; adjustment of mA and/or kV according to the patients size (this includes techniques or standardized protocols for targeted exams where dose is matched to indication/reason for exam: i.e. extremities or head);  iterative reconstruction technique.    COMPARISON:  none    FINDINGS:  There are atherosclerotic calcifications of the intracranial arteries.  There is bihemispheric white matter hypodensity consistent with chronic microvascular ischemic change.  There appear to be small chronic lacunar infarcts of the left caudate and corona radiata.  There is no CT evidence of acute major vascular territory cortical infarct.  There is no evidence of intracranial hemorrhage, mass-effect, hydrocephalus or other acute disease.  There is mild chronic patchy mucosal thickening of the paranasal sinuses.  The mastoid air cells are clear. No calvarial fracture is identified.                                 Medical Decision Making:   ED Management:  All findings were reviewed with the patient/family in detail along with the diagnosis of Union Palsy.  I see no indication of  an emergent process beyond that addressed during our encounter but have duly counseled the patient/family regarding the need for prompt follow-up as well as the indications that should prompt immediate return to the emergency room should new or worrisome developments occur.  The patient has additionally been provided with printed information regarding diagnosis as well as instructions regarding follow up and any medications intended to manage the patient's aforementioned conditions.  The patient/family communicates understanding of all this information and all remaining questions and concerns were addressed at this time.                                       Clinical Impression:       ICD-10-CM ICD-9-CM   1. Bell's palsy G51.0 351.0   2. Neurological deficit present R29.818 781.99             ED Disposition Condition    Discharge Stable        ED Prescriptions     Medication Sig Dispense Start Date End Date Auth. Provider    predniSONE (DELTASONE) 20 MG tablet  (Status: Discontinued) Take 2 tablets (40 mg total) by mouth 2 (two) times daily. for 7 days 10 tablet 3/20/2020 3/20/2020 Jatin Davalos MD    valACYclovir (VALTREX) 1000 MG tablet  (Status: Discontinued) Take 1 tablet (1,000 mg total) by mouth 3 (three) times daily. for 7 days 21 tablet 3/20/2020 3/20/2020 Jatin Davalos MD    white petrolatum-mineral oil 56.8-42.5% (LACRI-LUBE S.O.P.) 56.8-42.5 % Oint  (Status: Discontinued) Place into the left eye every evening. 15 g 3/20/2020 3/20/2020 Jatin Davalos MD    predniSONE (DELTASONE) 20 MG tablet Take 2 tablets (40 mg total) by mouth 2 (two) times daily. for 7 days 10 tablet 3/20/2020 3/27/2020 Jatin Davalos MD    valACYclovir (VALTREX) 1000 MG tablet Take 1 tablet (1,000 mg total) by mouth 3 (three) times daily. for 7 days 21 tablet 3/20/2020 3/27/2020 Jatin Davalos MD    white petrolatum-mineral oil 56.8-42.5% (LACRI-LUBE S.O.P.) 56.8-42.5 % Oint Place into the left eye every evening.  15 g 3/20/2020  Jatin Davalos MD        Follow-up Information     Follow up With Specialties Details Why Contact Info    Richard Meléndez MD Family Medicine Call  for reassessment [address] [facility] [id number] [phone]                                       Jatin Davalos MD  03/24/20 0143

## 2020-03-22 RX ORDER — METOPROLOL TARTRATE 50 MG/1
TABLET ORAL
Qty: 180 TABLET | Refills: 0 | Status: SHIPPED | OUTPATIENT
Start: 2020-03-22 | End: 2020-06-29

## 2020-03-31 RX ORDER — VALACYCLOVIR HYDROCHLORIDE 1 G/1
TABLET, FILM COATED ORAL
Qty: 21 TABLET | Refills: 0 | OUTPATIENT
Start: 2020-03-31

## 2020-03-31 RX ORDER — PREDNISONE 20 MG/1
TABLET ORAL
Qty: 28 TABLET | OUTPATIENT
Start: 2020-03-31

## 2020-04-21 DIAGNOSIS — D50.0 IRON DEFICIENCY ANEMIA DUE TO CHRONIC BLOOD LOSS: ICD-10-CM

## 2020-04-21 RX ORDER — FERROUS SULFATE 325(65) MG
TABLET ORAL
Qty: 90 TABLET | Refills: 0 | Status: SHIPPED | OUTPATIENT
Start: 2020-04-21 | End: 2020-11-10 | Stop reason: SDUPTHER

## 2020-05-01 RX ORDER — VALACYCLOVIR HYDROCHLORIDE 1 G/1
1000 TABLET, FILM COATED ORAL 3 TIMES DAILY
Qty: 21 TABLET | Refills: 0 | Status: ON HOLD | OUTPATIENT
Start: 2020-05-01 | End: 2023-03-28 | Stop reason: HOSPADM

## 2020-06-02 ENCOUNTER — PATIENT OUTREACH (OUTPATIENT)
Dept: ADMINISTRATIVE | Facility: HOSPITAL | Age: 70
End: 2020-06-02

## 2020-06-26 DIAGNOSIS — I10 ESSENTIAL HYPERTENSION: ICD-10-CM

## 2020-06-27 RX ORDER — AMLODIPINE BESYLATE 10 MG/1
TABLET ORAL
Qty: 90 TABLET | Refills: 0 | Status: SHIPPED | OUTPATIENT
Start: 2020-06-27 | End: 2020-06-28 | Stop reason: SDUPTHER

## 2020-06-29 RX ORDER — AMLODIPINE BESYLATE 10 MG/1
10 TABLET ORAL DAILY
Qty: 90 TABLET | Refills: 0 | Status: SHIPPED | OUTPATIENT
Start: 2020-06-29 | End: 2020-11-10 | Stop reason: SDUPTHER

## 2020-06-29 NOTE — TELEPHONE ENCOUNTER
An error occurred while processing the e-prescribing message.     The message was not sent electronically to the requested pharmacy

## 2020-07-16 NOTE — MR AVS SNAPSHOT
[facility]  [address] [place] [address]  Phone: [phone]  Fax: [phone]                  Gama Huertas   2017 10:00 AM   Office Visit    Description:  Male : 1950   Provider:  Richard Meléndez MD   Department:  [facility]           Reason for Visit     Follow-up           Diagnoses this Visit        Comments    Essential hypertension    -  Primary     Microcytic anemia         Excessive drinking alcohol         Mental disorder                To Do List           Goals (5 Years of Data)     None       These Medications        Disp Refills Start End    amlodipine (NORVASC) 10 MG tablet 30 tablet 11 2017    Take 1 tablet (10 mg total) by mouth once daily. - Oral    Pharmacy: [facility] #: [phone]         [facility] On Call     [facility] On Call Nurse Care Line -  Assistance  Registered nurses in the [facility] On Call Center provide clinical advisement, health education, appointment booking, and other advisory services.  Call for this free service at [phone].             Medications           Message regarding Medications     Verify the changes and/or additions to your medication regime listed below are the same as discussed with your clinician today.  If any of these changes or additions are incorrect, please notify your healthcare provider.        START taking these NEW medications        Refills    amlodipine (NORVASC) 10 MG tablet 11    Sig: Take 1 tablet (10 mg total) by mouth once daily.    Class: Normal    Route: Oral      STOP taking these medications     hydrOXYzine HCl (ATARAX) 25 MG tablet TAKE 1 TABLET BY MOUTH THREE TIMES A DAY AS NEEDED FOR ITCHING           Verify that the below list of medications is an accurate representation of the medications you are currently taking.  If none reported, the list may be blank. If incorrect, please contact your  "healthcare provider. Carry this list with you in case of emergency.           Current Medications     alprazolam (XANAX) 0.25 MG tablet Take 1 tablet (0.25 mg total) by mouth daily as needed for Anxiety.    metoprolol tartrate (LOPRESSOR) 50 MG tablet Take 1 tablet (50 mg total) by mouth 2 (two) times daily.    amlodipine (NORVASC) 10 MG tablet Take 1 tablet (10 mg total) by mouth once daily.    loratadine (CLARITIN) 10 mg tablet Take 1 tablet (10 mg total) by mouth once daily.           Clinical Reference Information           Vital Signs - Last Recorded  Most recent update: 1/12/2017 10:41 AM by Jerad Ramsay LPN    BP Pulse Temp Ht Wt SpO2    (!) 160/88 71 97.8 °F (36.6 °C) (Oral) 5' 7" (1.702 m) 66.7 kg (147 lb 0.8 oz) 97%    BMI                23.03 kg/m2          Blood Pressure          Most Recent Value    BP  (!)  160/88      Allergies as of 1/12/2017     No Known Allergies      Immunizations Administered on Date of Encounter - 1/12/2017     None      Orders Placed During Today's Visit      Normal Orders This Visit    CBC auto differential     Future Labs/Procedures Expected by Expires    CBC auto differential  1/12/2017 3/13/2018      [facility] Sign-Up     Activating your [facility] account is as easy as 1-2-3!     1) Visit [email or web address], select Sign Up Now, enter this activation code and your date of birth, then select Next.  [email or web address]  Expires: 2/26/2017 11:19 AM      2) Create a username and password to use when you visit [facility] in the future and select a security question in case you lose your password and select Next.    3) Enter your e-mail address and click Sign Up!    Additional Information  If you have questions, please e-mail [email or web address] or call [phone] to talk to our [facility] staff. Remember, [facility] is NOT to be used for urgent needs. For medical emergencies, dial 911.         Smoking Cessation     If you would like to quit smoking:   You may be eligible for " free services if you are a [place] resident and started smoking cigarettes before September 1, 1988.  Call the Smoking Cessation Trust (SCT) toll free at (866) 935-4166 or (169) 545-1785.   Call 9-616-QUIT-NOW if you do not meet the above criteria.             High Dose Vitamin A Counseling: Side effects reviewed, pt to contact office should one occur.

## 2020-09-16 ENCOUNTER — TELEPHONE (OUTPATIENT)
Dept: FAMILY MEDICINE | Facility: CLINIC | Age: 70
End: 2020-09-16

## 2020-09-16 NOTE — TELEPHONE ENCOUNTER
LM advising patient shingles vaccines are done at the pharmacy only. Patient also notified that a message has been routed to Dr. Meléndez regarding getting a letter excusing him from jury duty due to health issues.       ----- Message from Ora Abdalla sent at 9/16/2020 10:25 AM CDT -----  Regarding: Vaccines  Contact: Self/ [phone]  Patient requesting to speak with you regarding the shingles vaccine. He says he also received a letter for jury duty but was supposed to be taken off the list due to his physical health. He needs something stating that he should not be on the list to do jury duty due to his health. Please call and advise.

## 2020-09-16 NOTE — TELEPHONE ENCOUNTER
Please see message below. Patient requesting a excuse for jury duty       ----- Message from Jany Hitchcock sent at 9/16/2020 11:56 AM CDT -----  Regarding: returning a call  Contact: [phone]/self  Type:  Patient Returning Call    Who Called: self  Who Left Message for Patient: ?   Does the patient know what this is regarding?: jury duty  Would the patient rather a call back or a response via [facility]? call  Best Call Back Number: [phone]/self  Additional Information:

## 2020-09-16 NOTE — LETTER
[place] [address] [address]  Phone: [phone]  Fax: [phone] September 16, 2020    Gama Huertas  [address]  [address]  [facility] [address]      To Whom It May Concern:    Gama Huertas is unable to participate in jury duty due to ***.    If you have any questions or concerns, please feel free to call my office.    Sincerely,        Ana Maria Frazier MA

## 2020-09-17 ENCOUNTER — TELEPHONE (OUTPATIENT)
Dept: FAMILY MEDICINE | Facility: CLINIC | Age: 70
End: 2020-09-17

## 2020-09-17 NOTE — TELEPHONE ENCOUNTER
Patient notified jury duty letter at . Shingles vaccine done at the pharmacy only.      ----- Message from Linda Ruffin sent at 9/17/2020 10:35 AM CDT -----  Contact: Rnnsjwi-[phone]  Type:  Needs Medical Advice    Who Called: Pt  Reason for Call: regarding a Shingles Shot and regarding a Letter for Jury Duty so the pt does not have to go, pt needs the letter as soon as possible   Would the patient rather a call back or a response via [facility]? Call back  Best Call Back Number: [phone]

## 2020-09-18 ENCOUNTER — TELEPHONE (OUTPATIENT)
Dept: FAMILY MEDICINE | Facility: CLINIC | Age: 70
End: 2020-09-18

## 2020-09-18 DIAGNOSIS — D50.0 IRON DEFICIENCY ANEMIA DUE TO CHRONIC BLOOD LOSS: ICD-10-CM

## 2020-09-18 DIAGNOSIS — R97.20 ELEVATED PSA: Primary | ICD-10-CM

## 2020-09-18 DIAGNOSIS — Z12.5 ENCOUNTER FOR SCREENING FOR MALIGNANT NEOPLASM OF PROSTATE: ICD-10-CM

## 2020-09-18 DIAGNOSIS — M94.9 DISORDER OF CARTILAGE, UNSPECIFIED: ICD-10-CM

## 2020-09-18 DIAGNOSIS — I10 ESSENTIAL HYPERTENSION: ICD-10-CM

## 2020-09-18 NOTE — TELEPHONE ENCOUNTER
Scheduled pt for annual wellness exam, on 11/10/2020, could you please place orders for annual labs.

## 2020-10-27 ENCOUNTER — PATIENT OUTREACH (OUTPATIENT)
Dept: ADMINISTRATIVE | Facility: HOSPITAL | Age: 70
End: 2020-10-27

## 2020-11-03 ENCOUNTER — LAB VISIT (OUTPATIENT)
Dept: LAB | Facility: HOSPITAL | Age: 70
End: 2020-11-03
Attending: FAMILY MEDICINE
Payer: MEDICARE

## 2020-11-03 DIAGNOSIS — I10 ESSENTIAL HYPERTENSION: ICD-10-CM

## 2020-11-03 DIAGNOSIS — R97.20 ELEVATED PSA: ICD-10-CM

## 2020-11-03 DIAGNOSIS — M94.9 DISORDER OF CARTILAGE, UNSPECIFIED: ICD-10-CM

## 2020-11-03 DIAGNOSIS — Z12.5 ENCOUNTER FOR SCREENING FOR MALIGNANT NEOPLASM OF PROSTATE: ICD-10-CM

## 2020-11-03 DIAGNOSIS — D50.0 IRON DEFICIENCY ANEMIA DUE TO CHRONIC BLOOD LOSS: ICD-10-CM

## 2020-11-03 LAB
25(OH)D3+25(OH)D2 SERPL-MCNC: 31 NG/ML (ref 30–96)
ALBUMIN SERPL BCP-MCNC: 4 G/DL (ref 3.5–5.2)
ALP SERPL-CCNC: 85 U/L (ref 38–126)
ALT SERPL W/O P-5'-P-CCNC: 9 U/L (ref 10–44)
ANION GAP SERPL CALC-SCNC: 6 MMOL/L (ref 8–16)
AST SERPL-CCNC: 26 U/L (ref 15–46)
BASOPHILS # BLD AUTO: 0.08 K/UL (ref 0–0.2)
BASOPHILS NFR BLD: 1.4 % (ref 0–1.9)
BILIRUB SERPL-MCNC: 0.5 MG/DL (ref 0.1–1)
BUN SERPL-MCNC: 19 MG/DL (ref 2–20)
CALCIUM SERPL-MCNC: 9.4 MG/DL (ref 8.7–10.5)
CHLORIDE SERPL-SCNC: 111 MMOL/L (ref 95–110)
CHOLEST SERPL-MCNC: 158 MG/DL (ref 120–199)
CHOLEST/HDLC SERPL: 4.8 {RATIO} (ref 2–5)
CO2 SERPL-SCNC: 28 MMOL/L (ref 23–29)
COMPLEXED PSA SERPL-MCNC: 5.7 NG/ML (ref 0–4)
CREAT SERPL-MCNC: 1.17 MG/DL (ref 0.5–1.4)
DIFFERENTIAL METHOD: ABNORMAL
EOSINOPHIL # BLD AUTO: 0.2 K/UL (ref 0–0.5)
EOSINOPHIL NFR BLD: 3 % (ref 0–8)
ERYTHROCYTE [DISTWIDTH] IN BLOOD BY AUTOMATED COUNT: 14.8 % (ref 11.5–14.5)
EST. GFR  (AFRICAN AMERICAN): >60 ML/MIN/1.73 M^2
EST. GFR  (NON AFRICAN AMERICAN): >60 ML/MIN/1.73 M^2
GLUCOSE SERPL-MCNC: 87 MG/DL (ref 70–110)
HCT VFR BLD AUTO: 46.1 % (ref 40–54)
HDLC SERPL-MCNC: 33 MG/DL (ref 40–75)
HDLC SERPL: 20.9 % (ref 20–50)
HGB BLD-MCNC: 14.3 G/DL (ref 14–18)
IMM GRANULOCYTES # BLD AUTO: 0.02 K/UL (ref 0–0.04)
IMM GRANULOCYTES NFR BLD AUTO: 0.3 % (ref 0–0.5)
LDLC SERPL CALC-MCNC: 102.6 MG/DL (ref 63–159)
LYMPHOCYTES # BLD AUTO: 2.1 K/UL (ref 1–4.8)
LYMPHOCYTES NFR BLD: 34.9 % (ref 18–48)
MCH RBC QN AUTO: 26.6 PG (ref 27–31)
MCHC RBC AUTO-ENTMCNC: 31 G/DL (ref 32–36)
MCV RBC AUTO: 86 FL (ref 82–98)
MONOCYTES # BLD AUTO: 0.6 K/UL (ref 0.3–1)
MONOCYTES NFR BLD: 10.5 % (ref 4–15)
NEUTROPHILS # BLD AUTO: 3 K/UL (ref 1.8–7.7)
NEUTROPHILS NFR BLD: 49.9 % (ref 38–73)
NONHDLC SERPL-MCNC: 125 MG/DL
NRBC BLD-RTO: 0 /100 WBC
PLATELET # BLD AUTO: 277 K/UL (ref 150–350)
PMV BLD AUTO: 10 FL (ref 9.2–12.9)
POTASSIUM SERPL-SCNC: 4.4 MMOL/L (ref 3.5–5.1)
PROT SERPL-MCNC: 7.3 G/DL (ref 6–8.4)
RBC # BLD AUTO: 5.37 M/UL (ref 4.6–6.2)
SODIUM SERPL-SCNC: 145 MMOL/L (ref 136–145)
TRIGL SERPL-MCNC: 112 MG/DL (ref 30–150)
TSH SERPL DL<=0.005 MIU/L-ACNC: 1.08 UIU/ML (ref 0.4–4)
WBC # BLD AUTO: 5.91 K/UL (ref 3.9–12.7)

## 2020-11-03 PROCEDURE — 36415 COLL VENOUS BLD VENIPUNCTURE: CPT | Mod: PO

## 2020-11-03 PROCEDURE — 85025 COMPLETE CBC W/AUTO DIFF WBC: CPT | Mod: PO

## 2020-11-03 PROCEDURE — 80053 COMPREHEN METABOLIC PANEL: CPT | Mod: PO

## 2020-11-03 PROCEDURE — 84153 ASSAY OF PSA TOTAL: CPT

## 2020-11-03 PROCEDURE — 84443 ASSAY THYROID STIM HORMONE: CPT | Mod: PO

## 2020-11-03 PROCEDURE — 80061 LIPID PANEL: CPT

## 2020-11-03 PROCEDURE — 82306 VITAMIN D 25 HYDROXY: CPT | Mod: PO

## 2020-11-10 ENCOUNTER — OFFICE VISIT (OUTPATIENT)
Dept: FAMILY MEDICINE | Facility: CLINIC | Age: 70
End: 2020-11-10
Payer: MEDICARE

## 2020-11-10 VITALS
SYSTOLIC BLOOD PRESSURE: 124 MMHG | OXYGEN SATURATION: 96 % | BODY MASS INDEX: 23.32 KG/M2 | HEIGHT: 68 IN | HEART RATE: 78 BPM | DIASTOLIC BLOOD PRESSURE: 76 MMHG | WEIGHT: 153.88 LBS | TEMPERATURE: 98 F

## 2020-11-10 DIAGNOSIS — Z86.010 HISTORY OF COLON POLYPS: ICD-10-CM

## 2020-11-10 DIAGNOSIS — F41.9 ANXIETY: ICD-10-CM

## 2020-11-10 DIAGNOSIS — I10 ESSENTIAL HYPERTENSION: ICD-10-CM

## 2020-11-10 DIAGNOSIS — R97.20 PSA ELEVATION: ICD-10-CM

## 2020-11-10 DIAGNOSIS — D50.0 IRON DEFICIENCY ANEMIA DUE TO CHRONIC BLOOD LOSS: ICD-10-CM

## 2020-11-10 DIAGNOSIS — D50.9 MICROCYTIC ANEMIA: ICD-10-CM

## 2020-11-10 DIAGNOSIS — K21.9 GASTROESOPHAGEAL REFLUX DISEASE: ICD-10-CM

## 2020-11-10 DIAGNOSIS — K25.4 CHRONIC GASTRIC ULCER WITH HEMORRHAGE: ICD-10-CM

## 2020-11-10 DIAGNOSIS — Z00.00 WELLNESS EXAMINATION: Primary | ICD-10-CM

## 2020-11-10 PROCEDURE — 99214 OFFICE O/P EST MOD 30 MIN: CPT | Mod: S$GLB,,, | Performed by: FAMILY MEDICINE

## 2020-11-10 PROCEDURE — 99499 RISK ADDL DX/OHS AUDIT: ICD-10-PCS | Mod: S$GLB,,, | Performed by: FAMILY MEDICINE

## 2020-11-10 PROCEDURE — 3074F SYST BP LT 130 MM HG: CPT | Mod: CPTII,S$GLB,, | Performed by: FAMILY MEDICINE

## 2020-11-10 PROCEDURE — 3078F PR MOST RECENT DIASTOLIC BLOOD PRESSURE < 80 MM HG: ICD-10-PCS | Mod: CPTII,S$GLB,, | Performed by: FAMILY MEDICINE

## 2020-11-10 PROCEDURE — 3078F DIAST BP <80 MM HG: CPT | Mod: CPTII,S$GLB,, | Performed by: FAMILY MEDICINE

## 2020-11-10 PROCEDURE — 3008F BODY MASS INDEX DOCD: CPT | Mod: CPTII,S$GLB,, | Performed by: FAMILY MEDICINE

## 2020-11-10 PROCEDURE — 1126F AMNT PAIN NOTED NONE PRSNT: CPT | Mod: S$GLB,,, | Performed by: FAMILY MEDICINE

## 2020-11-10 PROCEDURE — 99214 PR OFFICE/OUTPT VISIT, EST, LEVL IV, 30-39 MIN: ICD-10-PCS | Mod: S$GLB,,, | Performed by: FAMILY MEDICINE

## 2020-11-10 PROCEDURE — 1101F PR PT FALLS ASSESS DOC 0-1 FALLS W/OUT INJ PAST YR: ICD-10-PCS | Mod: CPTII,S$GLB,, | Performed by: FAMILY MEDICINE

## 2020-11-10 PROCEDURE — 1159F MED LIST DOCD IN RCRD: CPT | Mod: S$GLB,,, | Performed by: FAMILY MEDICINE

## 2020-11-10 PROCEDURE — 3074F PR MOST RECENT SYSTOLIC BLOOD PRESSURE < 130 MM HG: ICD-10-PCS | Mod: CPTII,S$GLB,, | Performed by: FAMILY MEDICINE

## 2020-11-10 PROCEDURE — 3008F PR BODY MASS INDEX (BMI) DOCUMENTED: ICD-10-PCS | Mod: CPTII,S$GLB,, | Performed by: FAMILY MEDICINE

## 2020-11-10 PROCEDURE — 1101F PT FALLS ASSESS-DOCD LE1/YR: CPT | Mod: CPTII,S$GLB,, | Performed by: FAMILY MEDICINE

## 2020-11-10 PROCEDURE — 99499 UNLISTED E&M SERVICE: CPT | Mod: S$GLB,,, | Performed by: FAMILY MEDICINE

## 2020-11-10 PROCEDURE — 1126F PR PAIN SEVERITY QUANTIFIED, NO PAIN PRESENT: ICD-10-PCS | Mod: S$GLB,,, | Performed by: FAMILY MEDICINE

## 2020-11-10 PROCEDURE — 1159F PR MEDICATION LIST DOCUMENTED IN MEDICAL RECORD: ICD-10-PCS | Mod: S$GLB,,, | Performed by: FAMILY MEDICINE

## 2020-11-10 RX ORDER — AMLODIPINE BESYLATE 10 MG/1
10 TABLET ORAL DAILY
Qty: 90 TABLET | Refills: 3 | Status: SHIPPED | OUTPATIENT
Start: 2020-11-10 | End: 2022-02-11 | Stop reason: SDUPTHER

## 2020-11-10 RX ORDER — PANTOPRAZOLE SODIUM 40 MG/1
40 TABLET, DELAYED RELEASE ORAL DAILY
Qty: 90 TABLET | Refills: 3 | Status: SHIPPED | OUTPATIENT
Start: 2020-11-10 | End: 2022-02-11 | Stop reason: SDUPTHER

## 2020-11-10 RX ORDER — METOPROLOL TARTRATE 50 MG/1
50 TABLET ORAL 2 TIMES DAILY
Qty: 180 TABLET | Refills: 3 | Status: SHIPPED | OUTPATIENT
Start: 2020-11-10 | End: 2022-02-11 | Stop reason: SDUPTHER

## 2020-11-10 RX ORDER — FERROUS SULFATE 325(65) MG
1 TABLET ORAL DAILY
Qty: 90 TABLET | Refills: 3 | Status: SHIPPED | OUTPATIENT
Start: 2020-11-10 | End: 2021-12-02

## 2020-11-10 NOTE — PROGRESS NOTES
"Subjective:      Patient ID: Gama Huertas is a 69 y.o. male.    Chief Complaint: Annual Exam      Vitals:    11/10/20 1405   BP: 124/76   Pulse: 78   Temp: 97.8 °F (36.6 °C)   TempSrc: Oral   SpO2: 96%   Weight: 69.8 kg (153 lb 14.1 oz)   Height: 5' 8" (1.727 m)        HPI   Wellness; first vist in few years; brought his med; [facility]  Smokes  No alcohol in 2 years    Problem List  Patient Active Problem List   Diagnosis    Microcytic anemia    Hypertension    Anxiety    Gastric ulcer    History of colon polyps    PSA elevation        ALLERGIES: Review of patient's allergies indicates:  No Known Allergies    MEDS:   Current Outpatient Medications:     ALPRAZolam (XANAX) 0.25 MG tablet, TAKE 1 TABLET BY MOUTH EVERY DAY AS NEEDED, Disp: 30 tablet, Rfl: 0    amLODIPine (NORVASC) 10 MG tablet, Take 1 tablet (10 mg total) by mouth once daily., Disp: 90 tablet, Rfl: 3    ferrous sulfate (FEOSOL) 325 mg (65 mg iron) Tab tablet, Take 1 tablet (325 mg total) by mouth once daily., Disp: 90 tablet, Rfl: 3    metoprolol tartrate (LOPRESSOR) 50 MG tablet, Take 1 tablet (50 mg total) by mouth 2 (two) times daily., Disp: 180 tablet, Rfl: 3    pantoprazole (PROTONIX) 40 MG tablet, Take 1 tablet (40 mg total) by mouth once daily., Disp: 90 tablet, Rfl: 3    valACYclovir (VALTREX) 1000 MG tablet, Take 1 tablet (1,000 mg total) by mouth 3 (three) times daily. for 7 days, Disp: 21 tablet, Rfl: 0    white petrolatum-mineral oil 56.8-42.5% (LACRI-LUBE S.O.P.) 56.8-42.5 % Oint, Place into the left eye every evening., Disp: 15 g, Rfl: 0      History:  Current Providers as of 11/10/2020  PCP: Richard Meléndez MD  Care Team Provider: Les Wakefield Jr., MD  Care Team Provider: Anil Lester LPN  Care Team Provider: Vince Vu LPN  Encounter Provider: Richard Meléndez MD, starting on Tue Nov 10, 2020 12:00 AM  Referring Provider: not found, starting on Tue Nov 10, 2020 12:00 AM  Consulting Physician: " Richard Meléndez MD, starting on Tue Nov 10, 2020  2:02 PM (Active)   Past Medical History:   Diagnosis Date    Gastric ulcer     Hypertension      Past Surgical History:   Procedure Laterality Date    COLONOSCOPY  3/11/14     repeat in 3 years dr montgomery     Social History     Tobacco Use    Smoking status: Current Every Day Smoker     Packs/day: 0.50    Smokeless tobacco: Never Used   Substance Use Topics    Alcohol use: Yes    Drug use: Not on file         Review of Systems   Constitutional: Negative for appetite change, fatigue, fever and unexpected weight change.   HENT: Negative for congestion, ear pain, sinus pressure and sore throat.    Eyes: Negative for pain and visual disturbance.   Respiratory: Negative for shortness of breath.    Cardiovascular: Negative for chest pain.   Gastrointestinal: Negative for abdominal pain, constipation and diarrhea.   Endocrine: Negative for polyuria.   Genitourinary: Negative for difficulty urinating and frequency.   Musculoskeletal: Negative for arthralgias, back pain and myalgias.   Skin: Negative for color change.   Allergic/Immunologic: Negative.    Neurological: Negative for syncope, weakness and headaches.   Hematological: Does not bruise/bleed easily.   Psychiatric/Behavioral: Negative for dysphoric mood and suicidal ideas. The patient is not nervous/anxious.    All other systems reviewed and are negative.    Objective:     Physical Exam  Vitals signs and nursing note reviewed.   Constitutional:       General: He is not in acute distress.     Appearance: He is well-developed. He is not diaphoretic.   HENT:      Head: Normocephalic and atraumatic.      Right Ear: External ear normal.      Left Ear: External ear normal.      Mouth/Throat:      Pharynx: No oropharyngeal exudate.   Eyes:      General: No scleral icterus.        Right eye: No discharge.         Left eye: No discharge.      Conjunctiva/sclera: Conjunctivae normal.      Pupils: Pupils are equal,  round, and reactive to light.   Neck:      Musculoskeletal: Normal range of motion and neck supple.      Thyroid: No thyromegaly.      Vascular: No JVD.      Trachea: No tracheal deviation.   Cardiovascular:      Rate and Rhythm: Normal rate and regular rhythm.      Heart sounds: No murmur. No friction rub. No gallop.    Pulmonary:      Effort: Pulmonary effort is normal. No respiratory distress.      Breath sounds: Normal breath sounds. No stridor. No wheezing or rales.   Chest:      Chest wall: No tenderness.   Abdominal:      General: There is no distension.      Palpations: Abdomen is soft. There is no mass.      Tenderness: There is no abdominal tenderness. There is no guarding or rebound.   Musculoskeletal: Normal range of motion.         General: No tenderness.   Lymphadenopathy:      Cervical: No cervical adenopathy.   Skin:     General: Skin is warm and dry.      Coloration: Skin is not pale.      Findings: No erythema or rash.   Neurological:      Mental Status: He is alert and oriented to person, place, and time.      Cranial Nerves: No cranial nerve deficit.      Motor: No abnormal muscle tone.      Coordination: Coordination normal.      Deep Tendon Reflexes: Reflexes are normal and symmetric. Reflexes normal.   Psychiatric:         Behavior: Behavior normal.         Thought Content: Thought content normal.         Judgment: Judgment normal.             Assessment:     1. Wellness examination    2. Anxiety    3. Essential hypertension    4. PSA elevation    5. Microcytic anemia    6. Chronic gastric ulcer with hemorrhage    7. History of colon polyps    8. Iron deficiency anemia due to chronic blood loss    9. Gastroesophageal reflux disease      Plan:        Medication List          Accurate as of November 10, 2020  3:07 PM. If you have any questions, ask your nurse or doctor.            CONTINUE taking these medications    ALPRAZolam 0.25 MG tablet  Commonly known as: XANAX  TAKE 1 TABLET BY MOUTH  EVERY DAY AS NEEDED     amLODIPine 10 MG tablet  Commonly known as: NORVASC  Take 1 tablet (10 mg total) by mouth once daily.     ferrous sulfate 325 mg (65 mg iron) Tab tablet  Commonly known as: FEOSOL  Take 1 tablet (325 mg total) by mouth once daily.     metoprolol tartrate 50 MG tablet  Commonly known as: LOPRESSOR  Take 1 tablet (50 mg total) by mouth 2 (two) times daily.     pantoprazole 40 MG tablet  Commonly known as: PROTONIX  Take 1 tablet (40 mg total) by mouth once daily.     valACYclovir 1000 MG tablet  Commonly known as: VALTREX  Take 1 tablet (1,000 mg total) by mouth 3 (three) times daily. for 7 days     white petrolatum-mineral oil 56.8-42.5% 56.8-42.5 % Oint  Commonly known as: LACRI-LUBE S.O.P.  Place into the left eye every evening.           Where to Get Your Medications      These medications were sent to [id number] [place], [place]  [address] [place] [address]    Phone: [phone]   · amLODIPine 10 MG tablet  · ferrous sulfate 325 mg (65 mg iron) Tab tablet  · metoprolol tartrate 50 MG tablet  · pantoprazole 40 MG tablet       Wellness examination    Anxiety    Essential hypertension  -     metoprolol tartrate (LOPRESSOR) 50 MG tablet; Take 1 tablet (50 mg total) by mouth 2 (two) times daily.  Dispense: 180 tablet; Refill: 3  -     amLODIPine (NORVASC) 10 MG tablet; Take 1 tablet (10 mg total) by mouth once daily.  Dispense: 90 tablet; Refill: 3    PSA elevation  -     Ambulatory referral/consult to Urology; Future; Expected date: 11/17/2020    Microcytic anemia    Chronic gastric ulcer with hemorrhage    History of colon polyps    Iron deficiency anemia due to chronic blood loss  -     ferrous sulfate (FEOSOL) 325 mg (65 mg iron) Tab tablet; Take 1 tablet (325 mg total) by mouth once daily.  Dispense: 90 tablet; Refill: 3    Gastroesophageal reflux disease  -     pantoprazole (PROTONIX) 40 MG tablet; Take 1 tablet (40 mg total) by mouth once  daily.  Dispense: 90 tablet; Refill: 3

## 2020-11-11 ENCOUNTER — TELEPHONE (OUTPATIENT)
Dept: FAMILY MEDICINE | Facility: CLINIC | Age: 70
End: 2020-11-11

## 2020-11-12 ENCOUNTER — TELEPHONE (OUTPATIENT)
Dept: FAMILY MEDICINE | Facility: CLINIC | Age: 70
End: 2020-11-12

## 2021-10-19 ENCOUNTER — TELEPHONE (OUTPATIENT)
Dept: FAMILY MEDICINE | Facility: CLINIC | Age: 71
End: 2021-10-19

## 2021-11-05 ENCOUNTER — TELEPHONE (OUTPATIENT)
Dept: FAMILY MEDICINE | Facility: CLINIC | Age: 71
End: 2021-11-05
Payer: MEDICARE

## 2021-11-17 ENCOUNTER — TELEPHONE (OUTPATIENT)
Dept: FAMILY MEDICINE | Facility: CLINIC | Age: 71
End: 2021-11-17
Payer: MEDICARE

## 2021-12-09 ENCOUNTER — TELEPHONE (OUTPATIENT)
Dept: FAMILY MEDICINE | Facility: CLINIC | Age: 71
End: 2021-12-09
Payer: MEDICARE

## 2021-12-30 ENCOUNTER — TELEPHONE (OUTPATIENT)
Dept: FAMILY MEDICINE | Facility: CLINIC | Age: 71
End: 2021-12-30
Payer: MEDICARE

## 2022-02-07 ENCOUNTER — TELEPHONE (OUTPATIENT)
Dept: FAMILY MEDICINE | Facility: CLINIC | Age: 72
End: 2022-02-07
Payer: MEDICARE

## 2022-02-07 NOTE — TELEPHONE ENCOUNTER
----- Message from Sandra Flores sent at 2/7/2022  1:11 PM CST -----  Contact: [phone]/ self  Who Called: pt   Regarding: pt wants to know if his appt on 02/11 can be change to 02/10 before 1 pm ,states his transportation can not do 02/11   Would the patient rather a call back or a response via [facility]? Call back  Best Call Back Number: [phone]  Additional Information:

## 2022-02-08 NOTE — TELEPHONE ENCOUNTER
Pt has been notified and verbalized understanding that we are unable to move his appt to 2/10/22 due to provider not being in office at that requested time frame.

## 2022-02-11 ENCOUNTER — OFFICE VISIT (OUTPATIENT)
Dept: FAMILY MEDICINE | Facility: CLINIC | Age: 72
End: 2022-02-11
Payer: MEDICARE

## 2022-02-11 VITALS
WEIGHT: 118.69 LBS | SYSTOLIC BLOOD PRESSURE: 151 MMHG | OXYGEN SATURATION: 97 % | HEIGHT: 68 IN | BODY MASS INDEX: 17.99 KG/M2 | TEMPERATURE: 98 F | DIASTOLIC BLOOD PRESSURE: 78 MMHG | HEART RATE: 83 BPM

## 2022-02-11 DIAGNOSIS — Z86.010 HISTORY OF COLON POLYPS: ICD-10-CM

## 2022-02-11 DIAGNOSIS — K21.9 GASTROESOPHAGEAL REFLUX DISEASE: ICD-10-CM

## 2022-02-11 DIAGNOSIS — D50.0 IRON DEFICIENCY ANEMIA DUE TO CHRONIC BLOOD LOSS: ICD-10-CM

## 2022-02-11 DIAGNOSIS — I10 ESSENTIAL HYPERTENSION: ICD-10-CM

## 2022-02-11 DIAGNOSIS — R63.4 WEIGHT LOSS: Primary | ICD-10-CM

## 2022-02-11 DIAGNOSIS — R97.20 PSA ELEVATION: ICD-10-CM

## 2022-02-11 DIAGNOSIS — K25.4 CHRONIC GASTRIC ULCER WITH HEMORRHAGE: ICD-10-CM

## 2022-02-11 DIAGNOSIS — Z00.00 WELLNESS EXAMINATION: ICD-10-CM

## 2022-02-11 DIAGNOSIS — Z12.5 ENCOUNTER FOR SCREENING FOR MALIGNANT NEOPLASM OF PROSTATE: ICD-10-CM

## 2022-02-11 DIAGNOSIS — F17.211 NICOTINE DEPENDENCE, CIGARETTES, IN REMISSION: ICD-10-CM

## 2022-02-11 DIAGNOSIS — D50.9 MICROCYTIC ANEMIA: ICD-10-CM

## 2022-02-11 PROCEDURE — 1160F RVW MEDS BY RX/DR IN RCRD: CPT | Mod: CPTII,S$GLB,, | Performed by: FAMILY MEDICINE

## 2022-02-11 PROCEDURE — 3288F FALL RISK ASSESSMENT DOCD: CPT | Mod: CPTII,S$GLB,, | Performed by: FAMILY MEDICINE

## 2022-02-11 PROCEDURE — 1126F PR PAIN SEVERITY QUANTIFIED, NO PAIN PRESENT: ICD-10-PCS | Mod: CPTII,S$GLB,, | Performed by: FAMILY MEDICINE

## 2022-02-11 PROCEDURE — 1159F PR MEDICATION LIST DOCUMENTED IN MEDICAL RECORD: ICD-10-PCS | Mod: CPTII,S$GLB,, | Performed by: FAMILY MEDICINE

## 2022-02-11 PROCEDURE — 1126F AMNT PAIN NOTED NONE PRSNT: CPT | Mod: CPTII,S$GLB,, | Performed by: FAMILY MEDICINE

## 2022-02-11 PROCEDURE — 3008F PR BODY MASS INDEX (BMI) DOCUMENTED: ICD-10-PCS | Mod: CPTII,S$GLB,, | Performed by: FAMILY MEDICINE

## 2022-02-11 PROCEDURE — 99214 OFFICE O/P EST MOD 30 MIN: CPT | Mod: S$GLB,,, | Performed by: FAMILY MEDICINE

## 2022-02-11 PROCEDURE — 1101F PR PT FALLS ASSESS DOC 0-1 FALLS W/OUT INJ PAST YR: ICD-10-PCS | Mod: CPTII,S$GLB,, | Performed by: FAMILY MEDICINE

## 2022-02-11 PROCEDURE — 1159F MED LIST DOCD IN RCRD: CPT | Mod: CPTII,S$GLB,, | Performed by: FAMILY MEDICINE

## 2022-02-11 PROCEDURE — 3077F PR MOST RECENT SYSTOLIC BLOOD PRESSURE >= 140 MM HG: ICD-10-PCS | Mod: CPTII,S$GLB,, | Performed by: FAMILY MEDICINE

## 2022-02-11 PROCEDURE — 3077F SYST BP >= 140 MM HG: CPT | Mod: CPTII,S$GLB,, | Performed by: FAMILY MEDICINE

## 2022-02-11 PROCEDURE — 3078F DIAST BP <80 MM HG: CPT | Mod: CPTII,S$GLB,, | Performed by: FAMILY MEDICINE

## 2022-02-11 PROCEDURE — 99214 PR OFFICE/OUTPT VISIT, EST, LEVL IV, 30-39 MIN: ICD-10-PCS | Mod: S$GLB,,, | Performed by: FAMILY MEDICINE

## 2022-02-11 PROCEDURE — 3078F PR MOST RECENT DIASTOLIC BLOOD PRESSURE < 80 MM HG: ICD-10-PCS | Mod: CPTII,S$GLB,, | Performed by: FAMILY MEDICINE

## 2022-02-11 PROCEDURE — 99499 RISK ADDL DX/OHS AUDIT: ICD-10-PCS | Mod: S$GLB,,, | Performed by: FAMILY MEDICINE

## 2022-02-11 PROCEDURE — 3008F BODY MASS INDEX DOCD: CPT | Mod: CPTII,S$GLB,, | Performed by: FAMILY MEDICINE

## 2022-02-11 PROCEDURE — 99499 UNLISTED E&M SERVICE: CPT | Mod: S$GLB,,, | Performed by: FAMILY MEDICINE

## 2022-02-11 PROCEDURE — 1101F PT FALLS ASSESS-DOCD LE1/YR: CPT | Mod: CPTII,S$GLB,, | Performed by: FAMILY MEDICINE

## 2022-02-11 PROCEDURE — 1160F PR REVIEW ALL MEDS BY PRESCRIBER/CLIN PHARMACIST DOCUMENTED: ICD-10-PCS | Mod: CPTII,S$GLB,, | Performed by: FAMILY MEDICINE

## 2022-02-11 PROCEDURE — 3288F PR FALLS RISK ASSESSMENT DOCUMENTED: ICD-10-PCS | Mod: CPTII,S$GLB,, | Performed by: FAMILY MEDICINE

## 2022-02-11 RX ORDER — AMLODIPINE BESYLATE 10 MG/1
10 TABLET ORAL DAILY
Qty: 90 TABLET | Refills: 3 | Status: SHIPPED | OUTPATIENT
Start: 2022-02-11 | End: 2022-05-29 | Stop reason: SDUPTHER

## 2022-02-11 RX ORDER — METOPROLOL TARTRATE 50 MG/1
50 TABLET ORAL 2 TIMES DAILY
Qty: 180 TABLET | Refills: 3 | Status: SHIPPED | OUTPATIENT
Start: 2022-02-11 | End: 2022-05-29 | Stop reason: SDUPTHER

## 2022-02-11 RX ORDER — PANTOPRAZOLE SODIUM 40 MG/1
40 TABLET, DELAYED RELEASE ORAL DAILY
Qty: 90 TABLET | Refills: 3 | Status: SHIPPED | OUTPATIENT
Start: 2022-02-11 | End: 2023-02-23 | Stop reason: SDUPTHER

## 2022-02-11 NOTE — PROGRESS NOTES
"Subjective:      Patient ID: Gama Huertas is a 71 y.o. male.    Chief Complaint: Annual Exam and Weight Loss      Vitals:    02/11/22 1148   BP: (!) 151/78   Pulse: 83   Temp: 98.3 °F (36.8 °C)   TempSrc: Oral   SpO2: 97%   Weight: 53.8 kg (118 lb 11.5 oz)   Height: 5' 8" (1.727 m)        HPI   Check up; here with SO, from [facility]; has lost documented 35 pounds since Nov 2020  Lives at [facility], had neg HIV at [facility]    Still smokes  Speech and hearing worse  Not eating as muich, there is food to eat;  Apparently drinks too much according to GUERITA drummond his memory    Problem List  Patient Active Problem List   Diagnosis    Microcytic anemia    Hypertension    Anxiety    Gastric ulcer    History of colon polyps    PSA elevation        ALLERGIES: Review of patient's allergies indicates:  No Known Allergies    MEDS:   Current Outpatient Medications:     amLODIPine (NORVASC) 10 MG tablet, Take 1 tablet (10 mg total) by mouth once daily., Disp: 90 tablet, Rfl: 3    FEROSUL 325 mg (65 mg iron) Tab tablet, TAKE 1 TABLET BY MOUTH ONCE A DAY, Disp: 90 tablet, Rfl: 3    metoprolol tartrate (LOPRESSOR) 50 MG tablet, Take 1 tablet (50 mg total) by mouth 2 (two) times daily., Disp: 180 tablet, Rfl: 3    pantoprazole (PROTONIX) 40 MG tablet, Take 1 tablet (40 mg total) by mouth once daily., Disp: 90 tablet, Rfl: 3    valACYclovir (VALTREX) 1000 MG tablet, Take 1 tablet (1,000 mg total) by mouth 3 (three) times daily. for 7 days, Disp: 21 tablet, Rfl: 0    white petrolatum-mineral oil 56.8-42.5% (LACRI-LUBE S.O.P.) 56.8-42.5 % Oint, Place into the left eye every evening. (Patient not taking: Reported on 2/11/2022), Disp: 15 g, Rfl: 0      History:  Current Providers as of 2/11/2022  PCP: Richard Meléndez MD  Care Team Provider: Les Wakefield Jr., MD  Care Team Provider: Anil Lester LPN  Care Team Provider: Vince Vu LPN  Care Team Provider: Marielena Green LPN  Encounter Provider: Richard CORONADO" MD Rika, starting on Fri Feb 11, 2022 12:00 AM  Referring Provider: not found, starting on Fri Feb 11, 2022 12:00 AM  Consulting Physician: Richard Meléndez MD, starting on Fri Feb 11, 2022 11:48 AM (Active)   Past Medical History:   Diagnosis Date    Gastric ulcer     Hypertension      Past Surgical History:   Procedure Laterality Date    COLONOSCOPY  3/11/14     repeat in 3 years dr montgomery     Social History     Tobacco Use    Smoking status: Current Every Day Smoker     Packs/day: 0.50    Smokeless tobacco: Never Used   Substance Use Topics    Alcohol use: Yes         Review of Systems   Constitutional: Positive for unexpected weight change. Negative for appetite change, fatigue and fever.   HENT: Positive for hearing loss and sore throat. Negative for congestion, ear pain and sinus pressure.    Eyes: Negative for pain and visual disturbance.   Respiratory: Negative for shortness of breath.    Cardiovascular: Negative for chest pain.   Gastrointestinal: Negative for abdominal pain, constipation and diarrhea.   Endocrine: Negative for polyuria.   Genitourinary: Negative for difficulty urinating and frequency.   Musculoskeletal: Negative for arthralgias, back pain and myalgias.   Skin: Negative for color change.   Allergic/Immunologic: Negative.    Neurological: Positive for speech difficulty. Negative for syncope, weakness and headaches.   Hematological: Does not bruise/bleed easily.   Psychiatric/Behavioral: Negative for dysphoric mood and suicidal ideas. The patient is not nervous/anxious.    All other systems reviewed and are negative.    Objective:     Physical Exam  Vitals and nursing note reviewed.   Constitutional:       General: He is not in acute distress.     Appearance: He is well-developed and well-nourished. He is not diaphoretic.   HENT:      Head: Normocephalic and atraumatic.      Right Ear: External ear normal.      Left Ear: External ear normal.      Mouth/Throat:      Mouth:  Oropharynx is clear and moist.      Pharynx: No oropharyngeal exudate.   Eyes:      General: No scleral icterus.        Right eye: No discharge.         Left eye: No discharge.      Extraocular Movements: EOM normal.      Conjunctiva/sclera: Conjunctivae normal.      Pupils: Pupils are equal, round, and reactive to light.   Neck:      Thyroid: No thyromegaly.      Vascular: No JVD.      Trachea: No tracheal deviation.   Cardiovascular:      Rate and Rhythm: Normal rate and regular rhythm.      Heart sounds: No murmur heard.  No friction rub. No gallop.    Pulmonary:      Effort: Pulmonary effort is normal. No respiratory distress.      Breath sounds: Normal breath sounds. No stridor. No wheezing or rales.   Chest:      Chest wall: No tenderness.   Abdominal:      General: There is no distension.      Palpations: Abdomen is soft. There is no mass.      Tenderness: There is no abdominal tenderness. There is no guarding or rebound.      Comments: scaphoid   Musculoskeletal:         General: No tenderness or edema. Normal range of motion.      Cervical back: Normal range of motion and neck supple.   Lymphadenopathy:      Cervical: No cervical adenopathy.   Skin:     General: Skin is warm and dry.      Coloration: Skin is not pale.      Findings: No erythema or rash.   Neurological:      Mental Status: He is alert and oriented to person, place, and time.      Cranial Nerves: No cranial nerve deficit.      Motor: No abnormal muscle tone.      Coordination: Coordination normal.      Deep Tendon Reflexes: Reflexes are normal and symmetric. Reflexes normal.   Psychiatric:         Mood and Affect: Mood and affect normal.         Behavior: Behavior normal.         Thought Content: Thought content normal.         Judgment: Judgment normal.             Assessment:     1. Weight loss    2. Essential hypertension    3. Gastroesophageal reflux disease      Plan:        Medication List          Accurate as of February 11, 2022 12:28  PM. If you have any questions, ask your nurse or doctor.            CONTINUE taking these medications    amLODIPine 10 MG tablet  Commonly known as: NORVASC  Take 1 tablet (10 mg total) by mouth once daily.     FeroSuL 325 mg (65 mg iron) Tab tablet  Generic drug: ferrous sulfate  TAKE 1 TABLET BY MOUTH ONCE A DAY     metoprolol tartrate 50 MG tablet  Commonly known as: LOPRESSOR  Take 1 tablet (50 mg total) by mouth 2 (two) times daily.     pantoprazole 40 MG tablet  Commonly known as: PROTONIX  Take 1 tablet (40 mg total) by mouth once daily.     valACYclovir 1000 MG tablet  Commonly known as: VALTREX  Take 1 tablet (1,000 mg total) by mouth 3 (three) times daily. for 7 days     white petrolatum-mineral oil 56.8-42.5% 56.8-42.5 % Oint  Commonly known as: LACRI-LUBE S.O.P.  Place into the left eye every evening.        STOP taking these medications    ALPRAZolam 0.25 MG tablet  Commonly known as: XANAX  Stopped by: Richard Meléndez MD          Weight loss    Essential hypertension    Gastroesophageal reflux disease

## 2022-02-22 ENCOUNTER — TELEPHONE (OUTPATIENT)
Dept: FAMILY MEDICINE | Facility: CLINIC | Age: 72
End: 2022-02-22
Payer: MEDICARE

## 2022-03-11 ENCOUNTER — TELEPHONE (OUTPATIENT)
Dept: FAMILY MEDICINE | Facility: CLINIC | Age: 72
End: 2022-03-11
Payer: MEDICARE

## 2022-03-11 NOTE — TELEPHONE ENCOUNTER
----- Message from Anusha Parmar sent at 3/11/2022 10:55 AM CST -----  Contact: Cousin  Type:  Needs Medical Advice    Who Called: isaak (cousin) POA  Symptoms (please be specific): drinking problem  Words slurring   How long has patient had these symptoms:  for awhile   Pharmacy name and phone #:    Would the patient rather a call back or a response via [facility]? Call   Best Call Back Number: [phone]  Additional Information:     Stated pt was put out of store due to being drunk  Circles around eyes looks bad he is turning darker  Isaak stated she thinks he may be sick and he is not admitting  She stated she is concerned and she would like an appt  Isaak stated she has poa (power of )

## 2022-05-16 ENCOUNTER — TELEPHONE (OUTPATIENT)
Dept: FAMILY MEDICINE | Facility: CLINIC | Age: 72
End: 2022-05-16
Payer: MEDICARE

## 2022-05-16 NOTE — TELEPHONE ENCOUNTER
----- Message from Bo Streeter sent at 5/16/2022 10:46 AM CDT -----  Contact: pt.     .Type:  Sooner Apoointment Request    Caller is requesting a sooner appointment.  Caller declined first available appointment listed below.  Caller will not accept being placed on the waitlist and is requesting a message be sent to doctor.  Name of Caller:pt. Power of  Ms. Brian Chavez  When is the first available appointment? August 2022  Symptoms:checkup/ discuss some things  Would the patient rather a call back or a response via [facility]? Call back  Best Call Back Number: [phone]  Additional Information: pt. Power of  is requesting an appt. Within the second week of June to discuss something  with the doctor

## 2022-05-16 NOTE — TELEPHONE ENCOUNTER
Spoke to patient's POABrian. Scheduled next available appointment in June. She states that the patient has gone back to drinking. Will bring in POA paperwork at the time of the appointment.

## 2022-05-23 ENCOUNTER — PATIENT OUTREACH (OUTPATIENT)
Dept: ADMINISTRATIVE | Facility: HOSPITAL | Age: 72
End: 2022-05-23
Payer: MEDICARE

## 2022-05-23 NOTE — PROGRESS NOTES
CERTIFICATE OF RETURN TO WORK      June 24, 2019      Re: Johan Laureano  [address] [facility] [address]        This is to certify that Johan Laureano has been under my care from 6/14/2019 and can return to regular work on 7/08/2019.      RESTRICTIONS:       REMARKS:         SIGNATURE:___________________________________________   6/24/2019                                                                Joey Wood MD  Internal Medicine  [address] [place] [address]  [phone]       2022 Care Everywhere updates requested and reviewed.  Immunizations reconciled. Media reports reviewed.  Duplicate HM overrides and  orders removed.  Overdue HM topic chart audit and/or requested.  Overdue lab testing linked to upcoming lab appointments if applies.    [facility] [employer], and [employer] reviewed Lab testing  Letter mailed.    HM updated with external colonoscopy report.       Health Maintenance Due   Topic Date Due    Colorectal Cancer Screening  2017    Shingles Vaccine (3 of 3) 2020    COVID-19 Vaccine (4 - Booster for Moderna series) 2022

## 2022-05-23 NOTE — LETTER
May 23, 2022    Gama Huertas  [address]  [address]  [place] [address]             [facility]  [address]  [place] [address]  Phone: [phone] Dear [facility] is committed to your overall health and regular health care screening is essential to maintaining good health.     Our records indicate you may be due for a Colon Cancer Screening.  A colonoscopy is a test to view the lower digestive tract (colon and rectum) to look for colon cancer.  Colorectal cancer (cancer in the colon or rectum) is a leading cause of cancer deaths in the [place]  Because colorectal cancer rarely causes symptoms in its early stages, screening for the disease is important.      If you have had a colonoscopy or colon cancer screening performed at an outside facility, please let your Primary Care Provider know when and where so the records may be requested for Richard Meléndez MD to review and update your chart.    If you have questions or would like to schedule your screening, please send a message to your primary care physician via [email or web address] or contact his/her office at [phone].     Sincerely,    Richard Meléndez MD and your [facility] Primary Care Team

## 2022-05-29 ENCOUNTER — HOSPITAL ENCOUNTER (EMERGENCY)
Facility: HOSPITAL | Age: 72
Discharge: HOME | End: 2022-05-29
Attending: EMERGENCY MEDICINE
Payer: MEDICARE

## 2022-05-29 VITALS
HEIGHT: 66 IN | SYSTOLIC BLOOD PRESSURE: 196 MMHG | TEMPERATURE: 99 F | WEIGHT: 110 LBS | DIASTOLIC BLOOD PRESSURE: 98 MMHG | OXYGEN SATURATION: 97 % | BODY MASS INDEX: 17.68 KG/M2 | RESPIRATION RATE: 20 BRPM | HEART RATE: 71 BPM

## 2022-05-29 DIAGNOSIS — E16.2 HYPOGLYCEMIA: Primary | ICD-10-CM

## 2022-05-29 DIAGNOSIS — R63.4 WEIGHT LOSS: ICD-10-CM

## 2022-05-29 DIAGNOSIS — R97.20 PSA ELEVATION: ICD-10-CM

## 2022-05-29 DIAGNOSIS — Z78.9 ALCOHOL USE: ICD-10-CM

## 2022-05-29 DIAGNOSIS — D50.0 IRON DEFICIENCY ANEMIA DUE TO CHRONIC BLOOD LOSS: ICD-10-CM

## 2022-05-29 DIAGNOSIS — D50.9 MICROCYTIC ANEMIA: ICD-10-CM

## 2022-05-29 DIAGNOSIS — I10 HYPERTENSION: ICD-10-CM

## 2022-05-29 DIAGNOSIS — Z00.00 WELLNESS EXAMINATION: ICD-10-CM

## 2022-05-29 DIAGNOSIS — K21.9 GASTROESOPHAGEAL REFLUX DISEASE: ICD-10-CM

## 2022-05-29 DIAGNOSIS — K25.4 CHRONIC GASTRIC ULCER WITH HEMORRHAGE: ICD-10-CM

## 2022-05-29 DIAGNOSIS — I10 ESSENTIAL HYPERTENSION: ICD-10-CM

## 2022-05-29 DIAGNOSIS — Z86.010 HISTORY OF COLON POLYPS: ICD-10-CM

## 2022-05-29 LAB
ALBUMIN SERPL BCP-MCNC: 3.7 G/DL (ref 3.5–5.2)
ALP SERPL-CCNC: 67 U/L (ref 38–126)
ALT SERPL W/O P-5'-P-CCNC: 11 U/L (ref 10–44)
ANION GAP SERPL CALC-SCNC: 10 MMOL/L (ref 8–16)
AST SERPL-CCNC: 26 U/L (ref 15–46)
BASOPHILS # BLD AUTO: 0.08 K/UL (ref 0–0.2)
BASOPHILS NFR BLD: 1.8 % (ref 0–1.9)
BILIRUB SERPL-MCNC: 0.4 MG/DL (ref 0.1–1)
CALCIUM SERPL-MCNC: 8.6 MG/DL (ref 8.7–10.5)
CHLORIDE SERPL-SCNC: 104 MMOL/L (ref 95–110)
CO2 SERPL-SCNC: 23 MMOL/L (ref 23–29)
CREAT SERPL-MCNC: 0.93 MG/DL (ref 0.5–1.4)
DIFFERENTIAL METHOD: ABNORMAL
EOSINOPHIL # BLD AUTO: 0 K/UL (ref 0–0.5)
EOSINOPHIL NFR BLD: 0.5 % (ref 0–8)
ERYTHROCYTE [DISTWIDTH] IN BLOOD BY AUTOMATED COUNT: 16.6 % (ref 11.5–14.5)
EST. GFR  (AFRICAN AMERICAN): >60 ML/MIN/1.73 M^2
EST. GFR  (NON AFRICAN AMERICAN): >60 ML/MIN/1.73 M^2
ETHANOL SERPL-MCNC: <10 MG/DL
GLUCOSE SERPL-MCNC: 88 MG/DL (ref 70–110)
HCT VFR BLD AUTO: 35.2 % (ref 40–54)
HGB BLD-MCNC: 11.4 G/DL (ref 14–18)
IMM GRANULOCYTES # BLD AUTO: 0.01 K/UL (ref 0–0.04)
IMM GRANULOCYTES NFR BLD AUTO: 0.2 % (ref 0–0.5)
LYMPHOCYTES # BLD AUTO: 1.1 K/UL (ref 1–4.8)
LYMPHOCYTES NFR BLD: 25.2 % (ref 18–48)
MCH RBC QN AUTO: 28.7 PG (ref 27–31)
MCHC RBC AUTO-ENTMCNC: 32.4 G/DL (ref 32–36)
MCV RBC AUTO: 89 FL (ref 82–98)
MONOCYTES # BLD AUTO: 0.4 K/UL (ref 0.3–1)
MONOCYTES NFR BLD: 8.5 % (ref 4–15)
NEUTROPHILS # BLD AUTO: 2.8 K/UL (ref 1.8–7.7)
NEUTROPHILS NFR BLD: 63.8 % (ref 38–73)
NRBC BLD-RTO: 0 /100 WBC
PLATELET # BLD AUTO: 272 K/UL (ref 150–450)
PMV BLD AUTO: 8.6 FL (ref 9.2–12.9)
POTASSIUM SERPL-SCNC: 3.4 MMOL/L (ref 3.5–5.1)
PROT SERPL-MCNC: 6.6 G/DL (ref 6–8.4)
RBC # BLD AUTO: 3.97 M/UL (ref 4.6–6.2)
SARS-COV-2 RDRP RESP QL NAA+PROBE: NEGATIVE
SODIUM SERPL-SCNC: 137 MMOL/L (ref 136–145)
TROPONIN I SERPL-MCNC: 0.04 NG/ML (ref 0.01–0.03)
TROPONIN I SERPL-MCNC: 0.05 NG/ML (ref 0.01–0.03)
UUN UR-MCNC: 16 MG/DL (ref 2–20)
WBC # BLD AUTO: 4.33 K/UL (ref 3.9–12.7)

## 2022-05-29 PROCEDURE — 82077 ASSAY SPEC XCP UR&BREATH IA: CPT | Mod: ER | Performed by: EMERGENCY MEDICINE

## 2022-05-29 PROCEDURE — 93010 ELECTROCARDIOGRAM REPORT: CPT | Mod: ,,, | Performed by: INTERNAL MEDICINE

## 2022-05-29 PROCEDURE — 93005 ELECTROCARDIOGRAM TRACING: CPT | Mod: ER

## 2022-05-29 PROCEDURE — 85025 COMPLETE CBC W/AUTO DIFF WBC: CPT | Mod: ER | Performed by: EMERGENCY MEDICINE

## 2022-05-29 PROCEDURE — U0002 COVID-19 LAB TEST NON-CDC: HCPCS | Mod: ER | Performed by: EMERGENCY MEDICINE

## 2022-05-29 PROCEDURE — 99900035 HC TECH TIME PER 15 MIN (STAT): Mod: ER

## 2022-05-29 PROCEDURE — 99285 EMERGENCY DEPT VISIT HI MDM: CPT | Mod: 25,ER

## 2022-05-29 PROCEDURE — 84484 ASSAY OF TROPONIN QUANT: CPT | Mod: ER | Performed by: EMERGENCY MEDICINE

## 2022-05-29 PROCEDURE — 80053 COMPREHEN METABOLIC PANEL: CPT | Mod: ER | Performed by: EMERGENCY MEDICINE

## 2022-05-29 PROCEDURE — 25000003 PHARM REV CODE 250: Mod: ER | Performed by: EMERGENCY MEDICINE

## 2022-05-29 PROCEDURE — 93010 EKG 12-LEAD: ICD-10-PCS | Mod: ,,, | Performed by: INTERNAL MEDICINE

## 2022-05-29 RX ORDER — AMLODIPINE BESYLATE 5 MG/1
10 TABLET ORAL
Status: COMPLETED | OUTPATIENT
Start: 2022-05-29 | End: 2022-05-29

## 2022-05-29 RX ORDER — ACETAMINOPHEN 500 MG
1000 TABLET ORAL
Status: COMPLETED | OUTPATIENT
Start: 2022-05-29 | End: 2022-05-29

## 2022-05-29 RX ORDER — AMLODIPINE BESYLATE 10 MG/1
10 TABLET ORAL DAILY
Qty: 90 TABLET | Refills: 3 | Status: SHIPPED | OUTPATIENT
Start: 2022-05-29 | End: 2023-06-08 | Stop reason: SDUPTHER

## 2022-05-29 RX ORDER — METOPROLOL TARTRATE 50 MG/1
50 TABLET ORAL 2 TIMES DAILY
Qty: 180 TABLET | Refills: 3 | Status: SHIPPED | OUTPATIENT
Start: 2022-05-29 | End: 2023-08-23 | Stop reason: SDUPTHER

## 2022-05-29 RX ORDER — METOPROLOL TARTRATE 50 MG/1
50 TABLET ORAL
Status: COMPLETED | OUTPATIENT
Start: 2022-05-29 | End: 2022-05-29

## 2022-05-29 RX ADMIN — ACETAMINOPHEN 1000 MG: 500 TABLET ORAL at 02:05

## 2022-05-29 RX ADMIN — METOPROLOL TARTRATE 50 MG: 50 TABLET, FILM COATED ORAL at 01:05

## 2022-05-29 RX ADMIN — AMLODIPINE BESYLATE 10 MG: 5 TABLET ORAL at 01:05

## 2022-05-29 NOTE — ED PROVIDER NOTES
"Encounter Date: 5/29/2022       History     Chief Complaint   Patient presents with    Hypertension     Pt called 911 stating his "blood pressure felt high." Ems found pt sitting outside. Pt reported he has been "drinking all day and not eating." CBG 56, given 2 oral tubes of glucose and repeat 52. EMS started and IV and gave pt d50. Repeat CBG upon arrival 100.      HPI   71 y.o.    Pt states his blood pressure "felt high," so he called EMS    He admits to drinking today and has not taken his BP meds "in awhile."    EMS had borderline glucose and gave dextrose    Pt is without complaint and states he "wants to be able to eat."  He has a bag of [employer] fast food with him and when I mention that he says "Yeah, I want to get into that."    Review of patient's allergies indicates:  No Known Allergies  Past Medical History:   Diagnosis Date    Gastric ulcer     Hypertension      Past Surgical History:   Procedure Laterality Date    COLONOSCOPY  3/11/14     repeat in 3 years dr montgomery     Family History   Problem Relation Age of Onset    Heart disease Mother     Stomach cancer Father     Kidney failure Father     Cancer Father         leukemia     Social History     Tobacco Use    Smoking status: Current Every Day Smoker     Packs/day: 0.50    Smokeless tobacco: Never Used   Substance Use Topics    Alcohol use: Yes     Review of Systems  All systems were reviewed/examined and were negative except as noted in the HPI.    Physical Exam     Initial Vitals [05/29/22 0044]   BP Pulse Resp Temp SpO2   (!) 215/102 84 18 98.9 °F (37.2 °C) 98 %      MAP       --         Physical Exam    General: the patient is awake, alert, and in no apparent distress.  Head: normocephalic and atraumatic, sclera are clear  Neck: supple without meningismus  Chest: clear to auscultation bilaterally, no respiratory distress  Heart: regular rate and rhythm  ABD soft, nontender, nondistended, no peritoneal signs  Back nt in the " midline  Extremities: warm and well perfused  Skin: warm and dry  Psych conversant  Neuro: awake, alert, oriented, CNs intact, fundi w/o papilledema, motor, sensory, and coordination intact in 4 extremities        ED Course   Procedures  Labs Reviewed   CBC W/ AUTO DIFFERENTIAL - Abnormal; Notable for the following components:       Result Value    RBC 3.97 (*)     Hemoglobin 11.4 (*)     Hematocrit 35.2 (*)     RDW 16.6 (*)     MPV 8.6 (*)     All other components within normal limits   COMPREHENSIVE METABOLIC PANEL - Abnormal; Notable for the following components:    Potassium 3.4 (*)     Calcium 8.6 (*)     All other components within normal limits   TROPONIN I - Abnormal; Notable for the following components:    Troponin I 0.040 (*)     All other components within normal limits   TROPONIN I - Abnormal; Notable for the following components:    Troponin I 0.051 (*)     All other components within normal limits   ALCOHOL,MEDICAL (ETHANOL)   SARS-COV-2 RNA AMPLIFICATION, QUAL    Narrative:     Is the patient symptomatic?->No          Imaging Results          CT Head Without Contrast (Final result)  Result time 05/29/22 07:42:57    Final result by Johnathan Camejo MD (05/29/22 07:42:57)                 Impression:      1.  Negative for acute intracranial process. Negative for hemorrhage, or skull fracture.    2.  Cerebral atrophy noted.  Intracranial atherosclerotic disease noted.  Small vessel ischemic changes noted.    3.  Paranasal sinus disease as detailed above.    4.  I agree with the preliminary interpretation rendered.    All CT scans at this facility are performed  using dose modulation techniques as appropriate to performed exam including the following:  automated exposure control; adjustment of mA and/or kV according to the patients size (this includes techniques or standardized protocols for targeted exams where dose is matched to indication/reason for exam: i.e. extremities or head);  iterative  reconstruction technique.      Electronically signed by: Johnathan Camejo MD  Date:    05/29/2022  Time:    07:42             Narrative:    EXAMINATION:  CT HEAD WITHOUT CONTRAST    CLINICAL HISTORY:  Mental status change, unknown cause;    TECHNIQUE:  Axial images through the brain and posterior fossa were obtained without the use of IV contrast.  Sagittal and coronal reconstructions are provided for review.    COMPARISON:  No comparison studies are available.    FINDINGS:  The ventricles are midline and the CSF spaces are prominent.  The gray-white matter junction is well preserved. Negative for intracranial vascular abnormalities. Negative for mass, mass effect, cerebral edema, hemorrhage or abnormal fluid collections.  Intracranial atherosclerotic disease noted.  Small vessel ischemic changes noted.    The skull and scalp are  intact.  Bilateral maxillary sinus mucoperiosteal thickening and mucous retention cyst.  Mild anterior right ethmoid air cell disease.  The rest of the paranasal sinuses, mastoid air cells, middle ears and ear canals are clear. The globes are intact.                                 Medications   amLODIPine tablet 10 mg (10 mg Oral Given 5/29/22 0113)   metoprolol tartrate (LOPRESSOR) tablet 50 mg (50 mg Oral Given 5/29/22 0113)   acetaminophen tablet 1,000 mg (1,000 mg Oral Given 5/29/22 0241)          Medical Decision Making:    This is an emergent evaluation of a patient presenting to the ED.  Nursing notes were reviewed.  I personally reviewed, read, and interpreted the ECG and any monitoring strips.  ECG: normal sinus rhythm, no critical findings with intervals, normal rate, and no ischemia.  Compared with prior if available.  Read and interpreted by me independently.      I reviewed radiology images personally along with interpretations.  Imaging reviewed by me, personally and independently, and prelims if available.  No acute/emergent findings noted on radiologic studies ordered.      I  personally reviewed and interpreted the laboratory results.  Borderline trop  I decided to obtain and review old medical records, which showed: well care    Advised cessation of alcohol  The patient was encouraged and counseled to quit smoking and use of tobacco products, including the effect on their health.  Smoking cessation resources were provided.    BP improved in ED  Offered observation for his borderline trop, he wants to go home    Alphonse David MD, SHANTA                   Clinical Impression:   Final diagnoses:  [I10] Hypertension  [E16.2] Hypoglycemia (Primary)  [Z72.89] Alcohol use          ED Disposition Condition    Discharge Stable        ED Prescriptions     Medication Sig Dispense Start Date End Date Auth. Provider    amLODIPine (NORVASC) 10 MG tablet Take 1 tablet (10 mg total) by mouth once daily. 90 tablet 5/29/2022  Gama David MD    metoprolol tartrate (LOPRESSOR) 50 MG tablet Take 1 tablet (50 mg total) by mouth 2 (two) times daily. 180 tablet 5/29/2022  Gama David MD        Follow-up Information     Follow up With Specialties Details Why Contact Info    Richard Meléndez MD Family Medicine Schedule an appointment as soon as possible for a visit   [address] [place] [id number]  [phone]          Discharged to home in stable condition, return to ED warnings given, follow up and patient care instructions given.      Alphonse David MD, SHANTA, [facility]  Department of Emergency Medicine]       Gama David MD  05/29/22 7427

## 2022-05-29 NOTE — ED NOTES
Redraw on TropI  Medicated with Tylenol for a HA,  .  VS updated.  Updated pt on POC.  Bed low and locked, SR up.   Will continue to monitor

## 2022-05-29 NOTE — ED NOTES
Pt states that he is feeling much better and no longer has a headache after receiving tylenol.   He states he is ready to go home.

## 2022-05-29 NOTE — ED NOTES
Pt brought a burger and fries from [facility].  Pt is requesting to eat his meal.  MD notified and states it is okay for patient to eat his food.

## 2022-06-06 ENCOUNTER — OFFICE VISIT (OUTPATIENT)
Dept: FAMILY MEDICINE | Facility: CLINIC | Age: 72
End: 2022-06-06
Payer: MEDICARE

## 2022-06-06 VITALS
BODY MASS INDEX: 18.4 KG/M2 | HEART RATE: 89 BPM | HEIGHT: 66 IN | TEMPERATURE: 98 F | DIASTOLIC BLOOD PRESSURE: 92 MMHG | WEIGHT: 114.5 LBS | OXYGEN SATURATION: 97 % | SYSTOLIC BLOOD PRESSURE: 198 MMHG

## 2022-06-06 DIAGNOSIS — F03.90 DEMENTIA WITHOUT BEHAVIORAL DISTURBANCE, UNSPECIFIED DEMENTIA TYPE: Primary | ICD-10-CM

## 2022-06-06 PROCEDURE — 1101F PR PT FALLS ASSESS DOC 0-1 FALLS W/OUT INJ PAST YR: ICD-10-PCS | Mod: CPTII,S$GLB,, | Performed by: FAMILY MEDICINE

## 2022-06-06 PROCEDURE — 99499 RISK ADDL DX/OHS AUDIT: ICD-10-PCS | Mod: S$GLB,,, | Performed by: FAMILY MEDICINE

## 2022-06-06 PROCEDURE — 1126F PR PAIN SEVERITY QUANTIFIED, NO PAIN PRESENT: ICD-10-PCS | Mod: CPTII,S$GLB,, | Performed by: FAMILY MEDICINE

## 2022-06-06 PROCEDURE — 1159F PR MEDICATION LIST DOCUMENTED IN MEDICAL RECORD: ICD-10-PCS | Mod: CPTII,S$GLB,, | Performed by: FAMILY MEDICINE

## 2022-06-06 PROCEDURE — 99214 OFFICE O/P EST MOD 30 MIN: CPT | Mod: S$GLB,,, | Performed by: FAMILY MEDICINE

## 2022-06-06 PROCEDURE — 3080F PR MOST RECENT DIASTOLIC BLOOD PRESSURE >= 90 MM HG: ICD-10-PCS | Mod: CPTII,S$GLB,, | Performed by: FAMILY MEDICINE

## 2022-06-06 PROCEDURE — 3288F PR FALLS RISK ASSESSMENT DOCUMENTED: ICD-10-PCS | Mod: CPTII,S$GLB,, | Performed by: FAMILY MEDICINE

## 2022-06-06 PROCEDURE — 99214 PR OFFICE/OUTPT VISIT, EST, LEVL IV, 30-39 MIN: ICD-10-PCS | Mod: S$GLB,,, | Performed by: FAMILY MEDICINE

## 2022-06-06 PROCEDURE — 1159F MED LIST DOCD IN RCRD: CPT | Mod: CPTII,S$GLB,, | Performed by: FAMILY MEDICINE

## 2022-06-06 PROCEDURE — 3080F DIAST BP >= 90 MM HG: CPT | Mod: CPTII,S$GLB,, | Performed by: FAMILY MEDICINE

## 2022-06-06 PROCEDURE — 3077F SYST BP >= 140 MM HG: CPT | Mod: CPTII,S$GLB,, | Performed by: FAMILY MEDICINE

## 2022-06-06 PROCEDURE — 3288F FALL RISK ASSESSMENT DOCD: CPT | Mod: CPTII,S$GLB,, | Performed by: FAMILY MEDICINE

## 2022-06-06 PROCEDURE — 3008F PR BODY MASS INDEX (BMI) DOCUMENTED: ICD-10-PCS | Mod: CPTII,S$GLB,, | Performed by: FAMILY MEDICINE

## 2022-06-06 PROCEDURE — 99499 UNLISTED E&M SERVICE: CPT | Mod: S$GLB,,, | Performed by: FAMILY MEDICINE

## 2022-06-06 PROCEDURE — 3008F BODY MASS INDEX DOCD: CPT | Mod: CPTII,S$GLB,, | Performed by: FAMILY MEDICINE

## 2022-06-06 PROCEDURE — 3077F PR MOST RECENT SYSTOLIC BLOOD PRESSURE >= 140 MM HG: ICD-10-PCS | Mod: CPTII,S$GLB,, | Performed by: FAMILY MEDICINE

## 2022-06-06 PROCEDURE — 1126F AMNT PAIN NOTED NONE PRSNT: CPT | Mod: CPTII,S$GLB,, | Performed by: FAMILY MEDICINE

## 2022-06-06 PROCEDURE — 1101F PT FALLS ASSESS-DOCD LE1/YR: CPT | Mod: CPTII,S$GLB,, | Performed by: FAMILY MEDICINE
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
## 2022-06-10 ENCOUNTER — TELEPHONE (OUTPATIENT)
Dept: FAMILY MEDICINE | Facility: CLINIC | Age: 72
End: 2022-06-10
Payer: MEDICARE

## 2022-06-10 NOTE — TELEPHONE ENCOUNTER
----- Message from Cheyanne Guthrie sent at 6/10/2022  9:51 AM CDT -----  Type:  Patient   Call    Who Called: Isaak  Would the patient rather a call back or a response via [facility]?  Call back   Best Call Back Number: [phone]   Additional Information: calling to check the status of paperwork to get pt into [facility]...  [facility] is waiting on paper work from Dr Meléndez to receive pt

## 2022-06-10 NOTE — TELEPHONE ENCOUNTER
Spoke with mike. Informed her that order will be faxed to [facility] today. She will contact [facility] today to have pt admitted.

## 2022-06-14 ENCOUNTER — TELEPHONE (OUTPATIENT)
Dept: FAMILY MEDICINE | Facility: CLINIC | Age: 72
End: 2022-06-14
Payer: MEDICARE

## 2022-07-18 ENCOUNTER — PES CALL (OUTPATIENT)
Dept: ADMINISTRATIVE | Facility: OTHER | Age: 72
End: 2022-07-18
Payer: MEDICARE

## 2022-11-15 ENCOUNTER — TELEPHONE (OUTPATIENT)
Dept: NEUROLOGY | Facility: CLINIC | Age: 72
End: 2022-11-15

## 2023-01-06 ENCOUNTER — TELEPHONE (OUTPATIENT)
Dept: NEUROLOGY | Facility: CLINIC | Age: 73
End: 2023-01-06
Payer: MEDICARE

## 2023-01-24 ENCOUNTER — HOSPITAL ENCOUNTER (EMERGENCY)
Facility: HOSPITAL | Age: 73
Discharge: HOME | End: 2023-01-24
Attending: EMERGENCY MEDICINE
Payer: MEDICARE

## 2023-01-24 VITALS
HEIGHT: 66 IN | TEMPERATURE: 98 F | BODY MASS INDEX: 21.69 KG/M2 | DIASTOLIC BLOOD PRESSURE: 72 MMHG | OXYGEN SATURATION: 98 % | HEART RATE: 76 BPM | RESPIRATION RATE: 18 BRPM | WEIGHT: 135 LBS | SYSTOLIC BLOOD PRESSURE: 166 MMHG

## 2023-01-24 DIAGNOSIS — F10.920 ALCOHOLIC INTOXICATION WITHOUT COMPLICATION: Primary | ICD-10-CM

## 2023-01-24 LAB — POCT GLUCOSE: 87 MG/DL (ref 70–110)

## 2023-01-24 PROCEDURE — 99282 EMERGENCY DEPT VISIT SF MDM: CPT | Mod: 25,ER

## 2023-01-24 PROCEDURE — 82962 GLUCOSE BLOOD TEST: CPT | Mod: ER

## 2023-01-24 NOTE — ED NOTES
Assist pt to stand and use a urinal. Pt wants to leave, educated on need to stay until he nadiya up since pt is unable to arrange a ride. Pt is back in bed with lights off and call light within reach.

## 2023-01-24 NOTE — ED NOTES
Pt able to dress self and ambulate with steady gait to the waiting room. Pt states his significant other is on the way from [place] to pick him up. Discharge teaching and instructions given to pt upon discharge.
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
## 2023-01-31 ENCOUNTER — TELEPHONE (OUTPATIENT)
Dept: FAMILY MEDICINE | Facility: CLINIC | Age: 73
End: 2023-01-31
Payer: MEDICARE

## 2023-01-31 NOTE — TELEPHONE ENCOUNTER
Spoke to pt's relative, Lakesha. She explained that the pt went through the program at [facility] about 2 mons ago and still continues to drink. Pt went to ED on 01/24/23 due to drinking too much. Pt's family is not sure what to do as the pt doesn't seem that he wants to stop drinking.

## 2023-01-31 NOTE — TELEPHONE ENCOUNTER
----- Message from Gisele Kim sent at 1/31/2023  8:41 AM CST -----  Type:  Sooner Apoointment Request    Caller is requesting a sooner appointment.  Caller declined first available appointment listed below.  Caller will  accept being placed on the waitlist and is requesting a message be sent to doctor.  Name of Caller:pt  When is the first available appointment?05/24/2023  Symptoms:c/u  Would the patient rather a call back or a response via [facility]? call  Best Call Back Number:[phone]  Additional Information:

## 2023-02-15 ENCOUNTER — TELEPHONE (OUTPATIENT)
Dept: FAMILY MEDICINE | Facility: CLINIC | Age: 73
End: 2023-02-15
Payer: MEDICARE

## 2023-02-15 NOTE — TELEPHONE ENCOUNTER
----- Message from Jany Hitchcock sent at 2/15/2023  3:01 PM CST -----  Regarding: sooner  Contact: [phone]  Type:  Sooner Apoointment Request    Caller is requesting a sooner appointment.  Caller declined first available appointment listed below.  Caller will not accept being placed on the waitlist and is requesting a message be sent to doctor.  Name of Caller: self   When is the first available appointment?   Symptoms: some problems and check up   Would the patient rather a call back or a response via [facility]?  Call   Best Call Back Number: [phone]  Additional Information:

## 2023-02-23 ENCOUNTER — OFFICE VISIT (OUTPATIENT)
Dept: FAMILY MEDICINE | Facility: CLINIC | Age: 73
End: 2023-02-23
Payer: MEDICARE

## 2023-02-23 VITALS
WEIGHT: 124.13 LBS | TEMPERATURE: 99 F | HEART RATE: 78 BPM | OXYGEN SATURATION: 98 % | DIASTOLIC BLOOD PRESSURE: 82 MMHG | HEIGHT: 66 IN | SYSTOLIC BLOOD PRESSURE: 124 MMHG | BODY MASS INDEX: 19.95 KG/M2

## 2023-02-23 DIAGNOSIS — H17.9 CORNEAL SCAR: ICD-10-CM

## 2023-02-23 DIAGNOSIS — D50.0 IRON DEFICIENCY ANEMIA DUE TO CHRONIC BLOOD LOSS: ICD-10-CM

## 2023-02-23 DIAGNOSIS — R63.4 WEIGHT LOSS: ICD-10-CM

## 2023-02-23 DIAGNOSIS — Z86.010 HISTORY OF COLON POLYPS: ICD-10-CM

## 2023-02-23 DIAGNOSIS — D50.9 MICROCYTIC ANEMIA: ICD-10-CM

## 2023-02-23 DIAGNOSIS — F17.200 SMOKES: ICD-10-CM

## 2023-02-23 DIAGNOSIS — D69.2 OTHER NONTHROMBOCYTOPENIC PURPURA: ICD-10-CM

## 2023-02-23 DIAGNOSIS — M81.0 AGE-RELATED OSTEOPOROSIS WITHOUT CURRENT PATHOLOGICAL FRACTURE: ICD-10-CM

## 2023-02-23 DIAGNOSIS — F41.9 ANXIETY: ICD-10-CM

## 2023-02-23 DIAGNOSIS — R97.20 PSA ELEVATION: ICD-10-CM

## 2023-02-23 DIAGNOSIS — M94.9 DISORDER OF CARTILAGE, UNSPECIFIED: ICD-10-CM

## 2023-02-23 DIAGNOSIS — I10 PRIMARY HYPERTENSION: ICD-10-CM

## 2023-02-23 DIAGNOSIS — F03.90 DEMENTIA WITHOUT BEHAVIORAL DISTURBANCE: ICD-10-CM

## 2023-02-23 DIAGNOSIS — M40.04 POSTURAL KYPHOSIS OF THORACIC REGION: ICD-10-CM

## 2023-02-23 DIAGNOSIS — K25.4 CHRONIC GASTRIC ULCER WITH HEMORRHAGE: ICD-10-CM

## 2023-02-23 DIAGNOSIS — Z12.11 COLON CANCER SCREENING: ICD-10-CM

## 2023-02-23 DIAGNOSIS — Z00.00 WELLNESS EXAMINATION: Primary | ICD-10-CM

## 2023-02-23 DIAGNOSIS — F17.211 NICOTINE DEPENDENCE, CIGARETTES, IN REMISSION: ICD-10-CM

## 2023-02-23 DIAGNOSIS — Z12.5 ENCOUNTER FOR SCREENING FOR MALIGNANT NEOPLASM OF PROSTATE: ICD-10-CM

## 2023-02-23 DIAGNOSIS — F10.10 EXCESSIVE DRINKING ALCOHOL: ICD-10-CM

## 2023-02-23 DIAGNOSIS — K21.9 GASTROESOPHAGEAL REFLUX DISEASE WITHOUT ESOPHAGITIS: ICD-10-CM

## 2023-02-23 DIAGNOSIS — I10 ESSENTIAL HYPERTENSION: ICD-10-CM

## 2023-02-23 PROCEDURE — 99214 OFFICE O/P EST MOD 30 MIN: CPT | Mod: S$GLB,,, | Performed by: FAMILY MEDICINE

## 2023-02-23 PROCEDURE — 99214 PR OFFICE/OUTPT VISIT, EST, LEVL IV, 30-39 MIN: ICD-10-PCS | Mod: S$GLB,,, | Performed by: FAMILY MEDICINE

## 2023-02-23 PROCEDURE — 1126F PR PAIN SEVERITY QUANTIFIED, NO PAIN PRESENT: ICD-10-PCS | Mod: CPTII,S$GLB,, | Performed by: FAMILY MEDICINE

## 2023-02-23 PROCEDURE — 3074F PR MOST RECENT SYSTOLIC BLOOD PRESSURE < 130 MM HG: ICD-10-PCS | Mod: CPTII,S$GLB,, | Performed by: FAMILY MEDICINE

## 2023-02-23 PROCEDURE — 3288F PR FALLS RISK ASSESSMENT DOCUMENTED: ICD-10-PCS | Mod: CPTII,S$GLB,, | Performed by: FAMILY MEDICINE

## 2023-02-23 PROCEDURE — 3008F BODY MASS INDEX DOCD: CPT | Mod: CPTII,S$GLB,, | Performed by: FAMILY MEDICINE

## 2023-02-23 PROCEDURE — 1159F MED LIST DOCD IN RCRD: CPT | Mod: CPTII,S$GLB,, | Performed by: FAMILY MEDICINE

## 2023-02-23 PROCEDURE — 1160F RVW MEDS BY RX/DR IN RCRD: CPT | Mod: CPTII,S$GLB,, | Performed by: FAMILY MEDICINE

## 2023-02-23 PROCEDURE — 1101F PR PT FALLS ASSESS DOC 0-1 FALLS W/OUT INJ PAST YR: ICD-10-PCS | Mod: CPTII,S$GLB,, | Performed by: FAMILY MEDICINE

## 2023-02-23 PROCEDURE — 3079F PR MOST RECENT DIASTOLIC BLOOD PRESSURE 80-89 MM HG: ICD-10-PCS | Mod: CPTII,S$GLB,, | Performed by: FAMILY MEDICINE

## 2023-02-23 PROCEDURE — 3288F FALL RISK ASSESSMENT DOCD: CPT | Mod: CPTII,S$GLB,, | Performed by: FAMILY MEDICINE

## 2023-02-23 PROCEDURE — 99499 RISK ADDL DX/OHS AUDIT: ICD-10-PCS | Mod: S$GLB,,, | Performed by: FAMILY MEDICINE

## 2023-02-23 PROCEDURE — 99499 UNLISTED E&M SERVICE: CPT | Mod: S$GLB,,, | Performed by: FAMILY MEDICINE

## 2023-02-23 PROCEDURE — 3074F SYST BP LT 130 MM HG: CPT | Mod: CPTII,S$GLB,, | Performed by: FAMILY MEDICINE

## 2023-02-23 PROCEDURE — 1126F AMNT PAIN NOTED NONE PRSNT: CPT | Mod: CPTII,S$GLB,, | Performed by: FAMILY MEDICINE

## 2023-02-23 PROCEDURE — 1159F PR MEDICATION LIST DOCUMENTED IN MEDICAL RECORD: ICD-10-PCS | Mod: CPTII,S$GLB,, | Performed by: FAMILY MEDICINE

## 2023-02-23 PROCEDURE — 3079F DIAST BP 80-89 MM HG: CPT | Mod: CPTII,S$GLB,, | Performed by: FAMILY MEDICINE

## 2023-02-23 PROCEDURE — 1101F PT FALLS ASSESS-DOCD LE1/YR: CPT | Mod: CPTII,S$GLB,, | Performed by: FAMILY MEDICINE

## 2023-02-23 PROCEDURE — 1160F PR REVIEW ALL MEDS BY PRESCRIBER/CLIN PHARMACIST DOCUMENTED: ICD-10-PCS | Mod: CPTII,S$GLB,, | Performed by: FAMILY MEDICINE

## 2023-02-23 PROCEDURE — 3008F PR BODY MASS INDEX (BMI) DOCUMENTED: ICD-10-PCS | Mod: CPTII,S$GLB,, | Performed by: FAMILY MEDICINE

## 2023-02-23 RX ORDER — PANTOPRAZOLE SODIUM 40 MG/1
40 TABLET, DELAYED RELEASE ORAL DAILY
Qty: 90 TABLET | Refills: 3 | Status: SHIPPED | OUTPATIENT
Start: 2023-02-23 | End: 2023-08-01 | Stop reason: SDUPTHER

## 2023-02-23 RX ORDER — TRAZODONE HYDROCHLORIDE 100 MG/1
TABLET ORAL
Status: ON HOLD | COMMUNITY
Start: 2023-02-01 | End: 2023-03-28 | Stop reason: HOSPADM

## 2023-02-23 NOTE — PROGRESS NOTES
"Subjective:      Patient ID: Gama Huertas is a 72 y.o. male.    Chief Complaint: Follow-up      Vitals:    02/23/23 0927   BP: 124/82   Pulse: 78   Temp: 98.5 °F (36.9 °C)   TempSrc: Oral   SpO2: 98%   Weight: 56.3 kg (124 lb 1.9 oz)   Height: 5' 6" (1.676 m)        HPI   Check up; here with a friend  Both sing in the choir at [employer]  C/o GERD  Takes too much ASA otc goody  Smokes 1.5 ppd  Drionks too much  Was ion ER for alcohol iontoxicatin    Problem List  Patient Active Problem List   Diagnosis    Microcytic anemia    Hypertension    Anxiety    Gastric ulcer    History of colon polyps    PSA elevation    Other nonthrombocytopenic purpura    Dementia without behavioral disturbance    Smokes        ALLERGIES: Review of patient's allergies indicates:  No Known Allergies    MEDS:   Current Outpatient Medications:     amLODIPine (NORVASC) 10 MG tablet, Take 1 tablet (10 mg total) by mouth once daily., Disp: 90 tablet, Rfl: 3    FEROSUL 325 mg (65 mg iron) Tab tablet, TAKE 1 TABLET BY MOUTH ONCE A DAY, Disp: 90 tablet, Rfl: 3    metoprolol tartrate (LOPRESSOR) 50 MG tablet, Take 1 tablet (50 mg total) by mouth 2 (two) times daily., Disp: 180 tablet, Rfl: 3    valACYclovir (VALTREX) 1000 MG tablet, Take 1 tablet (1,000 mg total) by mouth 3 (three) times daily. for 7 days, Disp: 21 tablet, Rfl: 0    pantoprazole (PROTONIX) 40 MG tablet, Take 1 tablet (40 mg total) by mouth once daily., Disp: 90 tablet, Rfl: 3    traZODone (DESYREL) 100 MG tablet, Take by mouth., Disp: , Rfl:       History:  Current Providers as of 2/23/2023  PCP: Richard Meléndez MD  Care Team Provider: Les Wakefield Jr., MD  Care Team Provider: Anil Lester LPN  Care Team Provider: Vince Vu LPN  Care Team Provider: Marielena Green LPN  Encounter Provider: Richard Meléndez MD, starting on Thu Feb 23, 2023 12:00 AM  Referring Provider: not found, starting on Thu Feb 23, 2023 12:00 AM  Consulting Physician: Richard Meléndez MD, starting on " u Feb 23, 2023  9:14 AM (Active)   Past Medical History:   Diagnosis Date    Gastric ulcer     Hypertension      Past Surgical History:   Procedure Laterality Date    COLONOSCOPY  3/11/14     repeat in 3 years dr mongtomery     Social History     Tobacco Use    Smoking status: Every Day     Packs/day: 0.50     Types: Cigarettes    Smokeless tobacco: Never   Substance Use Topics    Alcohol use: Yes     Alcohol/week: 35.0 standard drinks     Types: 35 Cans of beer per week         Review of Systems   Constitutional:  Negative for appetite change, fatigue, fever and unexpected weight change.   HENT:  Negative for congestion, ear pain, sinus pressure and sore throat.    Eyes:  Negative for pain and visual disturbance.   Respiratory:  Negative for shortness of breath.    Cardiovascular:  Negative for chest pain.   Gastrointestinal:  Negative for abdominal pain, constipation and diarrhea.        Occ herartburn   Endocrine: Negative for polyuria.   Genitourinary:  Negative for difficulty urinating and frequency.   Musculoskeletal:  Negative for arthralgias, back pain and myalgias.   Skin:  Negative for color change.   Allergic/Immunologic: Negative.    Neurological:  Negative for syncope, weakness and headaches.   Hematological:  Does not bruise/bleed easily.   Psychiatric/Behavioral:  Negative for dysphoric mood and suicidal ideas. The patient is not nervous/anxious.    All other systems reviewed and are negative.  Objective:     Physical Exam  Vitals and nursing note reviewed.   Constitutional:       General: He is not in acute distress.     Appearance: He is well-developed. He is not diaphoretic.   HENT:      Head: Normocephalic and atraumatic.      Right Ear: External ear normal.      Left Ear: External ear normal.      Mouth/Throat:      Pharynx: No oropharyngeal exudate.   Eyes:      General: No scleral icterus.        Right eye: No discharge.         Left eye: No discharge.      Comments: Right eye cornea scar and  irredgular pupil   Neck:      Thyroid: No thyromegaly.      Vascular: No JVD.      Trachea: No tracheal deviation.   Cardiovascular:      Rate and Rhythm: Normal rate and regular rhythm.      Heart sounds: No murmur heard.    No friction rub. No gallop.   Pulmonary:      Effort: Pulmonary effort is normal. No respiratory distress.      Breath sounds: Normal breath sounds. No stridor. No wheezing or rales.   Chest:      Chest wall: No tenderness.   Abdominal:      General: There is no distension.      Palpations: Abdomen is soft. There is no mass.      Tenderness: There is no abdominal tenderness. There is no guarding or rebound.   Genitourinary:     Comments: Declines LISBET  Musculoskeletal:         General: Deformity present. No tenderness. Normal range of motion.      Cervical back: Normal range of motion and neck supple.      Comments: kyphosis   Lymphadenopathy:      Cervical: No cervical adenopathy.   Skin:     General: Skin is warm and dry.      Coloration: Skin is not pale.      Findings: No erythema or rash.   Neurological:      Mental Status: He is alert and oriented to person, place, and time.      Cranial Nerves: No cranial nerve deficit.      Motor: No abnormal muscle tone.      Coordination: Coordination normal.      Deep Tendon Reflexes: Reflexes are normal and symmetric. Reflexes normal.   Psychiatric:         Behavior: Behavior normal.         Thought Content: Thought content normal.         Judgment: Judgment normal.           Assessment:     1. Other nonthrombocytopenic purpura    2. Dementia without behavioral disturbance    3. Smokes    4. Excessive drinking alcohol    5. History of colon polyps    6. Chronic gastric ulcer with hemorrhage    7. Microcytic anemia    8. PSA elevation    9. Primary hypertension    10. Anxiety    11. Iron deficiency anemia due to chronic blood loss    12. Wellness examination    13. Nicotine dependence, cigarettes, in remission     14. Encounter for screening for  malignant neoplasm of prostate    15. Disorder of cartilage, unspecified    16. Essential hypertension    17. Gastroesophageal reflux disease    18. Weight loss    19. Colon cancer screening    20. Corneal scar    21. Postural kyphosis of thoracic region    22. Age-related osteoporosis without current pathological fracture      Plan:        Medication List            Accurate as of February 23, 2023 10:15 AM. If you have any questions, ask your nurse or doctor.                CONTINUE taking these medications      amLODIPine 10 MG tablet  Commonly known as: NORVASC  Take 1 tablet (10 mg total) by mouth once daily.     FeroSuL 325 mg (65 mg iron) Tab tablet  Generic drug: ferrous sulfate  TAKE 1 TABLET BY MOUTH ONCE A DAY     metoprolol tartrate 50 MG tablet  Commonly known as: LOPRESSOR  Take 1 tablet (50 mg total) by mouth 2 (two) times daily.     pantoprazole 40 MG tablet  Commonly known as: PROTONIX  Take 1 tablet (40 mg total) by mouth once daily.     traZODone 100 MG tablet  Commonly known as: DESYREL     valACYclovir 1000 MG tablet  Commonly known as: VALTREX  Take 1 tablet (1,000 mg total) by mouth 3 (three) times daily. for 7 days               Where to Get Your Medications        These medications were sent to "[id number] [address] [address]      Phone: [phone]   pantoprazole 40 MG tablet       Other nonthrombocytopenic purpura  -     CT Chest Lung Screening Low Dose; Future; Expected date: 02/23/2023  -     CBC Auto Differential; Future; Expected date: 02/23/2023  -     Comprehensive Metabolic Panel; Future; Expected date: 02/23/2023  -     Lipid Panel; Future  -     PSA, Screening; Future  -     TSH; Future  -     Urinalysis; Future  -     Vitamin D; Future    Dementia without behavioral disturbance  -     CT Chest Lung Screening Low Dose; Future; Expected date: 02/23/2023  -     CBC Auto Differential; Future; Expected date:  02/23/2023  -     Comprehensive Metabolic Panel; Future; Expected date: 02/23/2023  -     Lipid Panel; Future  -     PSA, Screening; Future  -     TSH; Future  -     Urinalysis; Future  -     Vitamin D; Future    Smokes  -     CT Chest Lung Screening Low Dose; Future; Expected date: 02/23/2023  -     CBC Auto Differential; Future; Expected date: 02/23/2023  -     Comprehensive Metabolic Panel; Future; Expected date: 02/23/2023  -     Lipid Panel; Future  -     PSA, Screening; Future  -     TSH; Future  -     Urinalysis; Future  -     Vitamin D; Future    Excessive drinking alcohol  -     CT Chest Lung Screening Low Dose; Future; Expected date: 02/23/2023  -     CBC Auto Differential; Future; Expected date: 02/23/2023  -     Comprehensive Metabolic Panel; Future; Expected date: 02/23/2023  -     Lipid Panel; Future  -     PSA, Screening; Future  -     TSH; Future  -     Urinalysis; Future  -     Vitamin D; Future    History of colon polyps  -     CT Chest Lung Screening Low Dose; Future; Expected date: 02/23/2023  -     CBC Auto Differential; Future; Expected date: 02/23/2023  -     Comprehensive Metabolic Panel; Future; Expected date: 02/23/2023  -     Lipid Panel; Future  -     PSA, Screening; Future  -     TSH; Future  -     Urinalysis; Future  -     Vitamin D; Future  -     pantoprazole (PROTONIX) 40 MG tablet; Take 1 tablet (40 mg total) by mouth once daily.  Dispense: 90 tablet; Refill: 3    Chronic gastric ulcer with hemorrhage  -     CT Chest Lung Screening Low Dose; Future; Expected date: 02/23/2023  -     CBC Auto Differential; Future; Expected date: 02/23/2023  -     Comprehensive Metabolic Panel; Future; Expected date: 02/23/2023  -     Lipid Panel; Future  -     PSA, Screening; Future  -     TSH; Future  -     Urinalysis; Future  -     Vitamin D; Future  -     pantoprazole (PROTONIX) 40 MG tablet; Take 1 tablet (40 mg total) by mouth once daily.  Dispense: 90 tablet; Refill: 3    Microcytic anemia  -      CT Chest Lung Screening Low Dose; Future; Expected date: 02/23/2023  -     CBC Auto Differential; Future; Expected date: 02/23/2023  -     Comprehensive Metabolic Panel; Future; Expected date: 02/23/2023  -     Lipid Panel; Future  -     PSA, Screening; Future  -     TSH; Future  -     Urinalysis; Future  -     Vitamin D; Future  -     pantoprazole (PROTONIX) 40 MG tablet; Take 1 tablet (40 mg total) by mouth once daily.  Dispense: 90 tablet; Refill: 3    PSA elevation  -     CT Chest Lung Screening Low Dose; Future; Expected date: 02/23/2023  -     CBC Auto Differential; Future; Expected date: 02/23/2023  -     Comprehensive Metabolic Panel; Future; Expected date: 02/23/2023  -     Lipid Panel; Future  -     PSA, Screening; Future  -     TSH; Future  -     Urinalysis; Future  -     Vitamin D; Future  -     pantoprazole (PROTONIX) 40 MG tablet; Take 1 tablet (40 mg total) by mouth once daily.  Dispense: 90 tablet; Refill: 3    Primary hypertension  -     CT Chest Lung Screening Low Dose; Future; Expected date: 02/23/2023  -     CBC Auto Differential; Future; Expected date: 02/23/2023  -     Comprehensive Metabolic Panel; Future; Expected date: 02/23/2023  -     Lipid Panel; Future  -     PSA, Screening; Future  -     TSH; Future  -     Urinalysis; Future  -     Vitamin D; Future    Anxiety  -     CT Chest Lung Screening Low Dose; Future; Expected date: 02/23/2023  -     CBC Auto Differential; Future; Expected date: 02/23/2023  -     Comprehensive Metabolic Panel; Future; Expected date: 02/23/2023  -     Lipid Panel; Future  -     PSA, Screening; Future  -     TSH; Future  -     Urinalysis; Future  -     Vitamin D; Future    Iron deficiency anemia due to chronic blood loss  -     CT Chest Lung Screening Low Dose; Future; Expected date: 02/23/2023  -     CBC Auto Differential; Future; Expected date: 02/23/2023  -     Comprehensive Metabolic Panel; Future; Expected date: 02/23/2023  -     Lipid Panel; Future  -      PSA, Screening; Future  -     TSH; Future  -     Urinalysis; Future  -     Vitamin D; Future  -     pantoprazole (PROTONIX) 40 MG tablet; Take 1 tablet (40 mg total) by mouth once daily.  Dispense: 90 tablet; Refill: 3    Wellness examination  -     CT Chest Lung Screening Low Dose; Future; Expected date: 02/23/2023  -     CBC Auto Differential; Future; Expected date: 02/23/2023  -     Comprehensive Metabolic Panel; Future; Expected date: 02/23/2023  -     Lipid Panel; Future  -     PSA, Screening; Future  -     TSH; Future  -     Urinalysis; Future  -     Vitamin D; Future  -     pantoprazole (PROTONIX) 40 MG tablet; Take 1 tablet (40 mg total) by mouth once daily.  Dispense: 90 tablet; Refill: 3    Nicotine dependence, cigarettes, in remission   -     CT Chest Lung Screening Low Dose; Future; Expected date: 02/23/2023  -     CBC Auto Differential; Future; Expected date: 02/23/2023  -     Comprehensive Metabolic Panel; Future; Expected date: 02/23/2023  -     Lipid Panel; Future  -     PSA, Screening; Future  -     TSH; Future  -     Urinalysis; Future  -     Vitamin D; Future    Encounter for screening for malignant neoplasm of prostate  -     PSA, Screening; Future    Disorder of cartilage, unspecified  -     Vitamin D; Future    Essential hypertension  -     pantoprazole (PROTONIX) 40 MG tablet; Take 1 tablet (40 mg total) by mouth once daily.  Dispense: 90 tablet; Refill: 3    Gastroesophageal reflux disease  -     pantoprazole (PROTONIX) 40 MG tablet; Take 1 tablet (40 mg total) by mouth once daily.  Dispense: 90 tablet; Refill: 3    Weight loss  -     pantoprazole (PROTONIX) 40 MG tablet; Take 1 tablet (40 mg total) by mouth once daily.  Dispense: 90 tablet; Refill: 3    Colon cancer screening  -     Case Request Endoscopy: COLONOSCOPY    Corneal scar    Postural kyphosis of thoracic region    Age-related osteoporosis without current pathological fracture

## 2023-03-07 ENCOUNTER — TELEPHONE (OUTPATIENT)
Dept: GASTROENTEROLOGY | Facility: CLINIC | Age: 73
End: 2023-03-07
Payer: MEDICARE

## 2023-03-13 ENCOUNTER — PATIENT OUTREACH (OUTPATIENT)
Dept: ADMINISTRATIVE | Facility: OTHER | Age: 73
End: 2023-03-13
Payer: MEDICARE

## 2023-03-13 NOTE — PROGRESS NOTES
CHW - Initial Contact    This Community Health Worker completed OR updated the Social Determinant of Health questionnaire with MRN [id number] over the phone today.    Pt identified barriers of most importance are: Rehabilitation   Referrals to community agencies completed with patient/caregiver consent outside of [facility] include: Yes  Referrals were put through [facility] - No  Support and Services: No support & services have been documented.  Other information discussed the patient needs / wants help with: Spoke to patient's cousin who is also patient's power of , Alicianehemiah Marionchristopher. Ms Chavez stated patient is homeless and alcohol dependent. Patient denies alcohol dependency and refuses to take prescription medication. Patient has monthly income but reportedly spends it on alcohol. Patient has reportedly been removed from previous senior living and out-patient rehab facilities due to excessive drinking, non payment of rent, and non compliance . Patient's family is locating a place for him to stay for two weeks while they find permanent housing and rehab services for him. CHW provided Ms Chavez with [facility] [place] [phone] and [facility] [phone] and encouraged to go through the  to get patient assessed.   Follow up required: No  No future outreach task assigned

## 2023-03-17 ENCOUNTER — TELEPHONE (OUTPATIENT)
Dept: FAMILY MEDICINE | Facility: CLINIC | Age: 73
End: 2023-03-17
Payer: MEDICARE

## 2023-03-22 ENCOUNTER — PATIENT OUTREACH (OUTPATIENT)
Dept: ADMINISTRATIVE | Facility: OTHER | Age: 73
End: 2023-03-22
Payer: MEDICARE

## 2023-03-22 NOTE — PROGRESS NOTES
CHW - Follow Up    This Community Health Worker completed a follow up visit with MOLLY [id number] over the phone today.  Pt/Caregiver reported: Spoke to patient's cousin who stated patient was recently detained by police for drinking. The police attempted to help patient and find a rehab/detox facility to admit him but all facilities declined to take or assist patient due to previous issues with patient. Patient's cousin was referred to a facility in [place] who is willing to take patient but only after patient has been admitted to [facility], completed an evaluation, and been enrolled in a detox program for 30 days. Patient's cousin is working on starting this process and is using the 's office to help her as recommended. Patient's cousin stated she wanted to update me once he has completed the program and possibly get patient into a nursing home after recovery.   Community Health Worker provided: No additional assistance provided at his time.  Follow up required: No  No future outreach task assigned

## 2023-03-23 ENCOUNTER — HOSPITAL ENCOUNTER (EMERGENCY)
Facility: HOSPITAL | Age: 73
Discharge: HOME | End: 2023-03-24
Attending: EMERGENCY MEDICINE
Payer: MEDICARE

## 2023-03-23 DIAGNOSIS — F10.10 ALCOHOL ABUSE: Primary | ICD-10-CM

## 2023-03-23 LAB
ALBUMIN SERPL BCP-MCNC: 4.1 G/DL (ref 3.5–5.2)
ALP SERPL-CCNC: 101 U/L (ref 38–126)
ALT SERPL W/O P-5'-P-CCNC: 17 U/L (ref 10–44)
AMPHET+METHAMPHET UR QL: NEGATIVE
ANION GAP SERPL CALC-SCNC: 7 MMOL/L (ref 8–16)
APAP SERPL-MCNC: <10 UG/ML (ref 10–20)
AST SERPL-CCNC: 28 U/L (ref 15–46)
BARBITURATES UR QL SCN>200 NG/ML: NEGATIVE
BASOPHILS # BLD AUTO: 0.04 K/UL (ref 0–0.2)
BASOPHILS NFR BLD: 0.7 % (ref 0–1.9)
BENZODIAZ UR QL SCN>200 NG/ML: NEGATIVE
BILIRUB SERPL-MCNC: 0.3 MG/DL (ref 0.1–1)
BILIRUB UR QL STRIP: NEGATIVE
BZE UR QL SCN: NEGATIVE
CALCIUM SERPL-MCNC: 8.8 MG/DL (ref 8.7–10.5)
CANNABINOIDS UR QL SCN: NEGATIVE
CHLORIDE SERPL-SCNC: 106 MMOL/L (ref 95–110)
CLARITY UR REFRACT.AUTO: CLEAR
CO2 SERPL-SCNC: 28 MMOL/L (ref 23–29)
COLOR UR AUTO: YELLOW
CREAT SERPL-MCNC: 1.13 MG/DL (ref 0.5–1.4)
CREAT UR-MCNC: 184.7 MG/DL (ref 23–375)
DIFFERENTIAL METHOD: ABNORMAL
EOSINOPHIL # BLD AUTO: 0.1 K/UL (ref 0–0.5)
EOSINOPHIL NFR BLD: 1.1 % (ref 0–8)
ERYTHROCYTE [DISTWIDTH] IN BLOOD BY AUTOMATED COUNT: 16.4 % (ref 11.5–14.5)
EST. GFR  (NO RACE VARIABLE): >60 ML/MIN/1.73 M^2
ETHANOL SERPL-MCNC: <10 MG/DL
GLUCOSE SERPL-MCNC: 95 MG/DL (ref 70–110)
GLUCOSE UR QL STRIP: NEGATIVE
HCT VFR BLD AUTO: 41.5 % (ref 40–54)
HGB BLD-MCNC: 12.8 G/DL (ref 14–18)
HGB UR QL STRIP: NEGATIVE
IMM GRANULOCYTES # BLD AUTO: 0.01 K/UL (ref 0–0.04)
IMM GRANULOCYTES NFR BLD AUTO: 0.2 % (ref 0–0.5)
KETONES UR QL STRIP: NEGATIVE
LEUKOCYTE ESTERASE UR QL STRIP: NEGATIVE
LYMPHOCYTES # BLD AUTO: 2 K/UL (ref 1–4.8)
LYMPHOCYTES NFR BLD: 32.7 % (ref 18–48)
MCH RBC QN AUTO: 25.8 PG (ref 27–31)
MCHC RBC AUTO-ENTMCNC: 30.8 G/DL (ref 32–36)
MCV RBC AUTO: 84 FL (ref 82–98)
METHADONE UR QL SCN>300 NG/ML: NEGATIVE
MONOCYTES # BLD AUTO: 0.8 K/UL (ref 0.3–1)
MONOCYTES NFR BLD: 13.4 % (ref 4–15)
NEUTROPHILS # BLD AUTO: 3.2 K/UL (ref 1.8–7.7)
NEUTROPHILS NFR BLD: 51.9 % (ref 38–73)
NITRITE UR QL STRIP: NEGATIVE
NRBC BLD-RTO: 0 /100 WBC
OPIATES UR QL SCN: NEGATIVE
PCP UR QL SCN>25 NG/ML: NEGATIVE
PH UR STRIP: 6 [PH] (ref 5–8)
PLATELET # BLD AUTO: 302 K/UL (ref 150–450)
PMV BLD AUTO: 8.7 FL (ref 9.2–12.9)
POTASSIUM SERPL-SCNC: 4.1 MMOL/L (ref 3.5–5.1)
PROT SERPL-MCNC: 7.3 G/DL (ref 6–8.4)
PROT UR QL STRIP: NEGATIVE
RBC # BLD AUTO: 4.97 M/UL (ref 4.6–6.2)
SALICYLATES SERPL-MCNC: 25.9 MG/DL (ref 15–30)
SODIUM SERPL-SCNC: 141 MMOL/L (ref 136–145)
SP GR UR STRIP: 1.02 (ref 1–1.03)
TOXICOLOGY INFORMATION: NORMAL
TSH SERPL DL<=0.005 MIU/L-ACNC: 1.21 UIU/ML (ref 0.4–4)
URN SPEC COLLECT METH UR: NORMAL
UROBILINOGEN UR STRIP-ACNC: NEGATIVE EU/DL
UUN UR-MCNC: 16 MG/DL (ref 2–20)
WBC # BLD AUTO: 6.11 K/UL (ref 3.9–12.7)

## 2023-03-23 PROCEDURE — G0427 INPT/ED TELECONSULT70: HCPCS | Mod: 95,,, | Performed by: STUDENT IN AN ORGANIZED HEALTH CARE EDUCATION/TRAINING PROGRAM

## 2023-03-23 PROCEDURE — G0427 PR INPT TELEHEALTH CON 70/>M: ICD-10-PCS | Mod: 95,,, | Performed by: STUDENT IN AN ORGANIZED HEALTH CARE EDUCATION/TRAINING PROGRAM

## 2023-03-23 PROCEDURE — 80143 DRUG ASSAY ACETAMINOPHEN: CPT | Mod: ER | Performed by: EMERGENCY MEDICINE

## 2023-03-23 PROCEDURE — 80179 DRUG ASSAY SALICYLATE: CPT | Mod: ER | Performed by: EMERGENCY MEDICINE

## 2023-03-23 PROCEDURE — 85025 COMPLETE CBC W/AUTO DIFF WBC: CPT | Mod: ER | Performed by: EMERGENCY MEDICINE

## 2023-03-23 PROCEDURE — 84443 ASSAY THYROID STIM HORMONE: CPT | Mod: ER | Performed by: EMERGENCY MEDICINE

## 2023-03-23 PROCEDURE — 99285 EMERGENCY DEPT VISIT HI MDM: CPT | Mod: ER

## 2023-03-23 PROCEDURE — 80053 COMPREHEN METABOLIC PANEL: CPT | Mod: ER | Performed by: EMERGENCY MEDICINE

## 2023-03-23 PROCEDURE — 81003 URINALYSIS AUTO W/O SCOPE: CPT | Mod: 59,ER | Performed by: EMERGENCY MEDICINE

## 2023-03-23 PROCEDURE — 80307 DRUG TEST PRSMV CHEM ANLYZR: CPT | Mod: ER | Performed by: EMERGENCY MEDICINE

## 2023-03-23 PROCEDURE — 82077 ASSAY SPEC XCP UR&BREATH IA: CPT | Mod: ER | Performed by: EMERGENCY MEDICINE

## 2023-03-23 NOTE — CONSULTS
"[facility]  Psychiatry  Telepsychiatry Consult Note    Patient agreeable to consultation via telepsychiatry.    Tele-Consultation from Psychiatry started: 3/23/2023 at 5:39P  The chief complaint leading to psychiatric consultation is: etoh use disorder  This consultation was requested by the Emergency Department attending physician.  The location of the consulting psychiatrist is [place], [place]  The patient location is  [facility] EMERGENCY DEPARTMENT   The patient arrived at the ED at: approx 4p  Also present with the patient at the time of the consultation: nurse    Patient Identification:   Gama Huertas is a 72 y.o. male.    Patient information was obtained from patient and OPC .  Patient presented involuntarily to the Emergency Department by ambulance where Consults  Consult Start Time: 03/23/2023 17:45 CDT  Consult End Time: 03/23/2023 20:00 CDT      Subjective:     History of Present Illness:  Per ED "72-year-old male who is brought here on an OPC from [place] written by Mrs. Chavez his power of , because she is worried about his mental health and alcoholism.  Apparently he was evicted from an independent living facility for alcohol abuse and sent to a group home where he was subsequently kicked out of.  He is apparently been living on the street.  Patient has a history of alcoholism and uses all of his money to drink alcohol.  Power of  is also concerned about his mental health stating that she feels that he may be a danger to himself.  She is apparently been looking for detox facilities for him but has not been having very much luck secondary to no one wanting to take him because he is had issues at places in the past.  [facility].  There is a plan to try to get him into [facility] however they will not take him until he is enrolled in a detox program for 30 days.  The " "patient is rather agitated, he denies SI or HI.  He states that he has to get to choir practice.  He is no other acute complaints."    On interview with the patient,    "They better not make me late for rehearsal." Pt reports he is in choir for [employer]. Pt is fully oriented. Pt says he is in the hospital because his boyfriend put him out of the house, he will not disclose why. Says his Zoroastrianism has been helping him. He says his cousin is concerned about him. He does not believe she has power of . "She advises me on things but doesn't control my life."     Regarding alcohol use, he says "it's a relief, but I do it too much."  Pt denies drinking at all today. Denies having a problem with alcohol use. Says Tuesday was the last time he did drink, and he had daiquiris (two). Denies any withdrawal symptoms today. He thinks he may have had a seizure in the past.     Pt says all his belongings are at the hotel.     He says he does not have a power of , he manages his own finances. He says he is currently staying in a hotel because some friends agreed to pay for him and he believes that after Saturday, he thinks people from the Zoroastrianism will "take care of it" for him. Difficulty with reality testing/problem solving. He cannot name anyone's phone numbers for assistance with collateral and refuses to cooperate with allowing this interview to discuss his situation. Terminates the interview in frustration/anger. Unable to fully assess memory/cognitive function other than orientation.    Collateral  Ms. Isaak Chavez [phone] - She says he is her first cousin and says he asked her to take over his medical power of  and says she has paperwork to prove it - March 2017. She says prior to that there was another woman who had his power of  Lore Mack who was discharged of her duty because she was stealing from him when he was intoxicated. Ms. Chavez doesn't know what diagnosis he has other " "than alcohol use disorder. He manages his own finances, and he has now multiple times in the last two months spent all his money on alcohol. She says he left a group home to live with a boyfriend, then a lady friend, who both kicked the patient out of their homes due to his alcohol use.     Police recently put up money for him to live at a hotel because they felt sorry for him dancing drunk in the streets in the middle of the night.     Ms. Chavez would like for him to live at a group home or at [facility].     She confirms he is in the choir, but says he has also been kicked out of choir functions due to intoxication.    She has concerns he is confabulating lately-- has been talking about being in the , has never been in the .     She has been trying to get him to see a neurologist, concern for dementia, but he refuses to go.    She has looked into AOT programs in [place], has been in touch with Lore Quan, but he has been refusing to self-present to [facility].      Psych Review of Symptoms: items highlighted should be considered positive    Depression/Mood: depression, changes in sleep, anhedonia, energy, appetite, concentration, psychomotor agitation/retardation, SI, HI. Denies euphoria, increased energy, grandiosity, decreased sleep, hyper-religiousity, impulsivity, distractibility, pleasure seeking.    Anxiety: panic attacks, ruminative thoughts, obsession/compulsions    Trauma: nightmares, flashbacks, avoidance, intrusive symptoms, dissociation/derealization, disordered eating, self-injurious behavior    Cognition: memory troubles, executive function concerns, "brain fog"    Psychosis: hallucinations, delusions, paranoia, persecutory thoughts, difficulty with reality testing    Biological considerations: no known hx hypothyroidism, b12 deficiency, syphillis, autoimmune disorders, strokes. Chronic medical diseases in the care of a primary care provider and well-controlled.    Psychiatric History: " "  Previous Psychiatric Hospitalizations: none  Previous Medication Trials: trazodone  Previous Suicide Attempts: none  History of Violence: possibly  History of Depression: yes  History of Lora: none  History of Auditory/Visual Hallucination none  History of Delusions: none  Outpatient psychiatrist (current & past): none- has a primary care provider    Substance Abuse History:  Tobacco: yes  Alcohol: heavy use  Illicit Substances: marijuana distantly  Detox/Rehab: pt says he hasn't ever been in a rheab    Legal History: Past charges/incarcerations: yes, for stealing for [employer]     Family Psychiatric History:   Mother with dementia    Social History:  Developmental/Childhood: reports grew up in [place]  *Education: [employer]  Employment Status/Finances: retired    Relationship Status/Sexual Orientation: reddy  Children: maybe?   Housing Status: pt currently living in a hotel   history: unclear - [facility]  Access to gun: none  [employer]: [employer]  Recreation/Hobbies: drinking alcohol, and choir    Psychiatric Mental Status Exam:  Arousal: alert  Sensorium/Orientation: oriented to grossly intact, person, place, situation, month of year, year  Behavior/Cooperation: cooperative   Speech: normal tone, normal rate, normal pitch, normal volume  Language: grossly intact  Mood: "I'm okay"  Affect:  friendly, seems intoxicated  Thought Process: goal-directed  Thought Content: focused on going to [employer] practice tonight  Auditory hallucinations: none  Visual hallucinations: none  Paranoia: none  Delusions:  none  Suicidal ideation: none   Homicidal ideation: none  Attention/Concentration:  intact  Memory:    Recent:  somewhat impaired   Remote: Intact   3/3 immediate, 0/3 at 5 min  Fund of Knowledge:  average  Abstract reasoning: intact  Insight: poor awareness of illness  Judgment: limited      Past Medical History:   Past Medical History:   Diagnosis Date "    Gastric ulcer     Hypertension       Laboratory Data: Labs Reviewed - No data to display    Neurological History:  Seizures: Yes  Head trauma: Yes    Allergies:   Review of patient's allergies indicates:  No Known Allergies    Medications in ER: Medications - No data to display    Medications at home: blood pressure medications    No new subjective & objective note has been filed under this hospital service since the last note was generated.      Assessment - Diagnosis - Goals:     Assessment:  Alcohol Use Disorder, severe  Concern for Werniecke's korsakoff syndrome (subacute) vs other cognitive impairment  Homelessness    Plan:  1. Dispo/Legal Status: PEC at this time as the pt is currently gravely disabled due to an acute psychiatric illness. Seek inpt bed for pt safety and stabilization when/if medically cleared by the ER team. Patient would benefit from Geriatric Psych placement.  2. Scheduled Medications: thiamine 500 mg q8hr  3. PRN Medications: Ativan prn non-redirectable agitation associated with breakthrough psychosis or nikky if needed to help the pt more effectively interact with his environment.   4. Precautions/Nursing: CIWA q6 hr  5. To-Do: Continue to observe pt's behavior while in the ER and will reassess the pt daily until placement is found.    6. Check TSH, B12 and RPR for reversible causes, recommend full neurocog testing      Time with patient: 52 minutes, 25 min with collateral sources      More than 50% of the time was spent counseling/coordinating care    Consulting clinician was informed of the encounter and consult note.    Consultation ended: 3/23/2023 at 8:10 pm    Obdulia Baker MD   Psychiatry  [facility]

## 2023-03-23 NOTE — ED PROVIDER NOTES
Encounter Date: 3/23/2023       History     Chief Complaint   Patient presents with    Alcohol Problem     Per OPC the patient was removed from assisted living and group home for alcohol use. The patient is to go to [facility] after detox and mental breonna evaluation.     Mental Health Problem     72-year-old male who is brought here on an OPC from [place] written by Mrs. Chavez his power of , because she is worried about his mental health and alcoholism.  Apparently he was evicted from an independent living facility for alcohol abuse and sent to a group home where he was subsequently kicked out of.  He is apparently been living on the street.  Patient has a history of alcoholism and uses all of his money to drink alcohol.  Power of  is also concerned about his mental health stating that she feels that he may be a danger to himself.  She is apparently been looking for detox facilities for him but has not been having very much luck secondary to no one wanting to take him because he is had issues at places in the past.  He apparently has a room in the [facility] that has been paid for through 03/25/2023.  There is a plan to try to get him into [facility] however they will not take him until he is enrolled in a detox program for 30 days.  The patient is rather agitated, he denies SI or HI.  He states that he has to get to choir practice.  He is no other acute complaints.    Review of patient's allergies indicates:  No Known Allergies  Past Medical History:   Diagnosis Date    Gastric ulcer     Hypertension      Past Surgical History:   Procedure Laterality Date    COLONOSCOPY  3/11/14     repeat in 3 years dr montgomery     Family History   Problem Relation Age of Onset    Heart disease Mother     Stomach cancer Father     Kidney failure Father     Cancer Father         leukemia     Social History     Tobacco Use    Smoking status: Every Day     Packs/day: 0.50      Types: Cigarettes    Smokeless tobacco: Never   Substance Use Topics    Alcohol use: Yes     Alcohol/week: 35.0 standard drinks     Types: 35 Cans of beer per week     Review of Systems   Psychiatric/Behavioral:  Positive for behavioral problems. Negative for suicidal ideas.      Physical Exam     Initial Vitals [03/23/23 1512]   BP Pulse Resp Temp SpO2   (!) 161/74 65 20 98.6 °F (37 °C) 100 %      MAP       --         Physical Exam    Constitutional:   72-year-old man who smells of alcohol but is alert and oriented x4   HENT:   Head: Normocephalic and atraumatic.   Cardiovascular:  Normal rate and regular rhythm.           No murmur heard.  Pulmonary/Chest: Breath sounds normal. He has no wheezes.   Abdominal: Abdomen is soft. There is no abdominal tenderness.   Musculoskeletal:         General: Normal range of motion.     Neurological: He is alert and oriented to person, place, and time.   Skin: Skin is warm and dry. Capillary refill takes less than 2 seconds.   Psychiatric:   Agitated because he has to stay        ED Course   Procedures  Labs Reviewed   CBC W/ AUTO DIFFERENTIAL - Abnormal; Notable for the following components:       Result Value    Hemoglobin 12.8 (*)     MCH 25.8 (*)     MCHC 30.8 (*)     RDW 16.4 (*)     MPV 8.7 (*)     All other components within normal limits   COMPREHENSIVE METABOLIC PANEL - Abnormal; Notable for the following components:    Anion Gap 7 (*)     All other components within normal limits   TSH   ALCOHOL,MEDICAL (ETHANOL)   DRUG SCREEN PANEL, URINE EMERGENCY    Narrative:     Preferred Collection Type->Urine, Clean Catch  Specimen Source->Urine   ACETAMINOPHEN LEVEL   SALICYLATE LEVEL   URINALYSIS, REFLEX TO URINE CULTURE    Narrative:     Preferred Collection Type->Urine, Clean Catch  Specimen Source->Urine          Imaging Results    None          Medications - No data to display                 Medically cleared for psychiatry placement: 3/23/2023 10:38 PM         Clinical  Impression:   Final diagnoses:  [F10.10] Alcohol abuse (Primary)        ED Disposition Condition    Transfer to Psych Facility Stable          ED Prescriptions    None       Follow-up Information    None          Cristiano العراقي DO  03/23/23 6711

## 2023-03-24 VITALS
RESPIRATION RATE: 19 BRPM | HEART RATE: 70 BPM | WEIGHT: 124 LBS | SYSTOLIC BLOOD PRESSURE: 180 MMHG | DIASTOLIC BLOOD PRESSURE: 80 MMHG | BODY MASS INDEX: 20.01 KG/M2 | OXYGEN SATURATION: 98 % | TEMPERATURE: 98 F

## 2023-03-24 NOTE — ED NOTES
Pt belongings include:  - army print gilda pack  - hat  - sunglasses  - necklace  - watch  - two rings  -wallet  - cigarettes  - sweater  - green shirt  - pants  - pair of socks  -shoes     All placed in 2 belonging bags and placed in white cabinet.

## 2023-03-24 NOTE — ED NOTES
Report received from SPIKE Villar RN  Assumed care of pt  Pt is AAOx4 c respirations even, unlabored  Pt is visibly frustrated c ED staff and wants to leave  Reported to pt that it is based on providers discretion for his safety.

## 2023-03-24 NOTE — ED NOTES
Pt is sleeping on stretcher c respirations even, unlabored c NADN  Bed locked and low c side rails up  PCT at bedside

## 2023-03-24 NOTE — PROVIDER PROGRESS NOTES - EMERGENCY DEPT.
Encounter Date: 3/23/2023    ED Physician Progress Notes        Physician Note:   The patient was signed out to me by Dr. العراقي at shift change pending psychiatry telemedicine evaluation.  He was brought to the ED by a family member who is concerned about his mental health and alcoholism.  He has been removed from multiple living facilities due to alcohol abuse.  He has been evaluated by psychiatrist who recommends placing him under pec and arranging transfer to an inpatient psychiatric facility for further management.

## 2023-03-24 NOTE — ED NOTES
"Per [facility], "Patient accepted by AJITH at [facility] for the service of Dr. MORENO. Report to be called to [phone]. [facility] will arrange transport."  "

## 2023-03-25 PROBLEM — F10.10 ALCOHOL ABUSE: Status: ACTIVE | Noted: 2023-03-25

## 2023-03-27 PROBLEM — F10.930 ALCOHOL WITHDRAWAL SYNDROME WITHOUT COMPLICATION: Status: ACTIVE | Noted: 2023-03-27

## 2023-03-27 PROBLEM — F10.20 ALCOHOL USE DISORDER, SEVERE, DEPENDENCE: Status: ACTIVE | Noted: 2023-03-27

## 2023-03-27 PROBLEM — F43.23 ADJUSTMENT DISORDER WITH MIXED ANXIETY AND DEPRESSED MOOD: Status: ACTIVE | Noted: 2023-03-27

## 2023-03-29 ENCOUNTER — PATIENT OUTREACH (OUTPATIENT)
Dept: ADMINISTRATIVE | Facility: CLINIC | Age: 73
End: 2023-03-29
Payer: MEDICARE

## 2023-03-29 ENCOUNTER — EXTERNAL HOSPITAL ADMISSION (OUTPATIENT)
Dept: ADMINISTRATIVE | Facility: CLINIC | Age: 73
End: 2023-03-29
Payer: MEDICARE

## 2023-04-05 ENCOUNTER — HOSPITAL ENCOUNTER (EMERGENCY)
Facility: HOSPITAL | Age: 73
Discharge: HOME | End: 2023-04-05
Attending: STUDENT IN AN ORGANIZED HEALTH CARE EDUCATION/TRAINING PROGRAM
Payer: MEDICARE

## 2023-04-05 VITALS
DIASTOLIC BLOOD PRESSURE: 79 MMHG | TEMPERATURE: 98 F | BODY MASS INDEX: 19.37 KG/M2 | RESPIRATION RATE: 18 BRPM | HEART RATE: 88 BPM | OXYGEN SATURATION: 95 % | WEIGHT: 120 LBS | SYSTOLIC BLOOD PRESSURE: 155 MMHG

## 2023-04-05 DIAGNOSIS — F10.920 ALCOHOLIC INTOXICATION WITHOUT COMPLICATION: Primary | ICD-10-CM

## 2023-04-05 LAB — POCT GLUCOSE: 86 MG/DL (ref 70–110)

## 2023-04-05 PROCEDURE — 94760 N-INVAS EAR/PLS OXIMETRY 1: CPT | Mod: ER

## 2023-04-05 PROCEDURE — 82962 GLUCOSE BLOOD TEST: CPT | Mod: ER

## 2023-04-05 PROCEDURE — 99283 EMERGENCY DEPT VISIT LOW MDM: CPT | Mod: 25,ER

## 2023-04-05 NOTE — PROVIDER PROGRESS NOTES - EMERGENCY DEPT.
Encounter Date: 4/5/2023    ED Physician Progress Notes        Physician Note:     Time: 04/05 2135  Comment: Patient is ambulatory without assistance or difficulty. Appears clinically sober after almost 7 hours of observation. Requesting discharge. Does not meet any PEC criteria. Given local resources. Advised to f/u with PCP. Told to return to ED if symptoms worsen, return, or change in character.  By: Jim Henderson MD

## 2023-04-05 NOTE — ED PROVIDER NOTES
"NAME:  Gama Huertas  CSN:     [id number]  MRN:    [id number]  ADMIT DATE: 4/5/2023        eMERGENCY dEPARTMENT eNCOUnter    CHIEF COMPLAINT    Chief Complaint   Patient presents with    Alcohol Intoxication     Brought to ED by EMS. EMS reports pt recently broke up with is boyfriend and was upset and started drinking. 1 empty 200cc bottle of vodka and 1 bottle of whiskey 2/3 full found on pt.        HPI    Gama Huertas is a 72 y.o. male with a past medical history of  has a past medical history of Gastric ulcer and Hypertension.     he presents to the ED due to AMS. Brought in by EMS after being called to the scene. Limited hx obtained from [facility] [place]. Pt found with 2 bottles of alcohol. Pt admits to EtOH use today. Denies trauma. States he lives locally in [facility].      HPI       PAST MEDICAL HISTORY  Past Medical History:   Diagnosis Date    Gastric ulcer     Hypertension        SURGICAL HISTORY    Past Surgical History:   Procedure Laterality Date    COLONOSCOPY  3/11/14     repeat in 3 years dr montgomery       FAMILY HISTORY    Family History   Problem Relation Age of Onset    Heart disease Mother     Stomach cancer Father     Kidney failure Father     Cancer Father         leukemia       SOCIAL HISTORY    Social History     Socioeconomic History    Marital status: Single   Occupational History     Comment: [employer]   Tobacco Use    Smoking status: Every Day     Packs/day: 0.50     Types: Cigarettes    Smokeless tobacco: Never   Substance and Sexual Activity    Alcohol use: Yes     Alcohol/week: 35.0 standard drinks     Types: 35 Cans of beer per week     Comment: Pt was vague and reported "celebrating too much maybe"    Drug use: Never   Other Topics Concern    Patient feels they ought to cut down on drinking/drug use No    Patient annoyed by others criticizing their drinking/drug use Yes    Patient has felt bad or guilty about drinking/drug use No    Patient has had a drink/used drugs as an eye " opener in the AM No       MEDICATIONS  Current Outpatient Medications   Medication Instructions    amLODIPine (NORVASC) 10 mg, Oral, Daily    FEROSUL 325 mg (65 mg iron) Tab tablet TAKE 1 TABLET BY MOUTH ONCE A DAY    metoprolol tartrate (LOPRESSOR) 50 mg, Oral, 2 times daily    pantoprazole (PROTONIX) 40 mg, Oral, Daily       ALLERGIES    Review of patient's allergies indicates:  No Known Allergies      REVIEW OF SYSTEMS   Review of Systems       PHYSICAL EXAM    Reviewed Triage Note    VITAL SIGNS:   ED Triage Vitals   Enc Vitals Group      BP       Pulse       Resp       Temp       Temp src       SpO2       Weight       Height       Head Circumference       Peak Flow       Pain Score       Pain Loc       Pain Edu?       Excl. in GC?        Patient Vitals for the past 24 hrs:   BP Temp Temp src Pulse Resp SpO2 Weight   04/05/23 2104 (!) 155/79 -- -- -- -- -- --   04/05/23 2034 -- -- -- 88 -- 95 % --   04/05/23 1602 (!) 150/75 -- -- -- -- 95 % --   04/05/23 1600 -- -- -- -- -- 96 % --   04/05/23 1522 (!) 180/86 -- -- -- -- -- --   04/05/23 1459 (!) 189/92 98 °F (36.7 °C) Oral 73 18 95 % 54.4 kg (120 lb)       Physical Exam    Nursing note and vitals reviewed.  Constitutional: He appears well-developed and well-nourished.   HENT:   Head: Normocephalic and atraumatic.   Eyes: EOM are normal. Pupils are equal, round, and reactive to light.   Neck: Neck supple.   Normal range of motion.  Cardiovascular:  Normal rate and regular rhythm.           Pulmonary/Chest: Breath sounds normal. No respiratory distress.   Abdominal: Abdomen is soft. There is no abdominal tenderness.   Musculoskeletal:         General: Normal range of motion.      Cervical back: Normal range of motion and neck supple.     Neurological: He is alert. No cranial nerve deficit.   Somonlent, awakes to tactile stimili. Slurred speech. Moving all extremities.   Skin: Skin is warm and dry.          EKG     Interpreted by EM physician if performed:                LABS  Pertinent labs reviewed. (See chart for details)   Labs Reviewed   POCT GLUCOSE         RADIOLOGY          Imaging Results    None           PROCEDURES    Procedures      ED COURSE & MEDICAL DECISION MAKING    Pertinent Labs & Imaging studies reviewed. (See chart for details and specific orders.)        Summary of review of records:   Multiple recent ED visits for EtOH.     MDM:  Gama Huertas is a 72 y.o. male who presents for AMS, admits to EtOH, clinically appears as though this is the case. Low suspicion for traumatic injury, underlying infection, metabolic derangement at this time. Glucose wnl and meal provided.          Medications - No data to display    ED Course as of 04/06/23 1319   Wed Apr 05, 2023   1754 Patient reassessed.  He is able to awake with verbal stimuli.  He is alert and oriented x3.  States he does not have anyone to take him home that he will take himself home.  He does still appear clinically intoxicated and admits to alcohol use today.  We will continue to monitor until he appears clinically sober for discharge.  At time of sign-out he is resting comfortably. [HL]   2135 Patient is ambulatory without assistance or difficulty. Appears clinically sober after almost 7 hours of observation. Requesting discharge. Does not meet any PEC criteria. Given local resources. Advised to f/u with PCP. Told to return to ED if symptoms worsen, return, or change in character. [KB]      ED Course User Index  [HL] Rosalinda Xiong DO  [KB] Jim Henderson MD         FINAL IMPRESSION    Final diagnoses:  [F10.920] Alcoholic intoxication without complication (Primary)       DISPOSITION  Patient discharged in stable condition        ED Prescriptions    None       Follow-up Information       Follow up With Specialties Details Why Contact Info    Richard Meléndez MD Family Medicine Schedule an appointment as soon as possible for a visit in 3 days For follow-up on today's visit. [address] [facility]  [place] [address]  [phone]      [facility] - Emergency Dept Emergency Medicine Go to  As needed, If symptoms worsen [address] [facility] [address] [phone]              DISCLAIMER: This note was prepared with [employer] voice recognition transcription software. Garbled syntax, mangled pronouns, and other bizarre constructions may be attributed to that software system.      DO Rosalinda Calvillo DO  04/06/23 1325

## 2023-04-06 ENCOUNTER — HOSPITAL ENCOUNTER (EMERGENCY)
Facility: HOSPITAL | Age: 73
Discharge: PSYCHIATRIC OR SUBSTANCE USE FACILITY | End: 2023-04-07
Attending: FAMILY MEDICINE
Payer: MEDICARE

## 2023-04-06 DIAGNOSIS — R41.82 AMS (ALTERED MENTAL STATUS): ICD-10-CM

## 2023-04-06 DIAGNOSIS — F10.929 ACUTE ALCOHOLIC INTOXICATION WITH COMPLICATION: Primary | ICD-10-CM

## 2023-04-06 LAB
ALBUMIN SERPL BCP-MCNC: 4.2 G/DL (ref 3.5–5.2)
ALP SERPL-CCNC: 100 U/L (ref 38–126)
ALT SERPL W/O P-5'-P-CCNC: 16 U/L (ref 10–44)
AMPHET+METHAMPHET UR QL: NEGATIVE
ANION GAP SERPL CALC-SCNC: 10 MMOL/L (ref 8–16)
APAP SERPL-MCNC: <10 UG/ML (ref 10–20)
AST SERPL-CCNC: 31 U/L (ref 15–46)
BARBITURATES UR QL SCN>200 NG/ML: NEGATIVE
BASOPHILS # BLD AUTO: 0.09 K/UL (ref 0–0.2)
BASOPHILS NFR BLD: 1.7 % (ref 0–1.9)
BENZODIAZ UR QL SCN>200 NG/ML: ABNORMAL
BILIRUB SERPL-MCNC: 0.3 MG/DL (ref 0.1–1)
BILIRUB UR QL STRIP: NEGATIVE
BZE UR QL SCN: NEGATIVE
CALCIUM SERPL-MCNC: 8.7 MG/DL (ref 8.7–10.5)
CANNABINOIDS UR QL SCN: NEGATIVE
CHLORIDE SERPL-SCNC: 109 MMOL/L (ref 95–110)
CLARITY UR REFRACT.AUTO: CLEAR
CO2 SERPL-SCNC: 25 MMOL/L (ref 23–29)
COLOR UR AUTO: YELLOW
CREAT SERPL-MCNC: 0.82 MG/DL (ref 0.5–1.4)
CREAT UR-MCNC: 48 MG/DL (ref 23–375)
DIFFERENTIAL METHOD: ABNORMAL
EOSINOPHIL # BLD AUTO: 0.1 K/UL (ref 0–0.5)
EOSINOPHIL NFR BLD: 1.3 % (ref 0–8)
ERYTHROCYTE [DISTWIDTH] IN BLOOD BY AUTOMATED COUNT: 16.3 % (ref 11.5–14.5)
EST. GFR  (NO RACE VARIABLE): >60 ML/MIN/1.73 M^2
ETHANOL SERPL-MCNC: 133 MG/DL
ETHANOL SERPL-MCNC: 284 MG/DL
GLUCOSE SERPL-MCNC: 87 MG/DL (ref 70–110)
GLUCOSE UR QL STRIP: NEGATIVE
HCT VFR BLD AUTO: 38 % (ref 40–54)
HGB BLD-MCNC: 12.2 G/DL (ref 14–18)
HGB UR QL STRIP: ABNORMAL
IMM GRANULOCYTES # BLD AUTO: 0.01 K/UL (ref 0–0.04)
IMM GRANULOCYTES NFR BLD AUTO: 0.2 % (ref 0–0.5)
KETONES UR QL STRIP: NEGATIVE
LEUKOCYTE ESTERASE UR QL STRIP: NEGATIVE
LYMPHOCYTES # BLD AUTO: 2 K/UL (ref 1–4.8)
LYMPHOCYTES NFR BLD: 38.7 % (ref 18–48)
MCH RBC QN AUTO: 25.9 PG (ref 27–31)
MCHC RBC AUTO-ENTMCNC: 32.1 G/DL (ref 32–36)
MCV RBC AUTO: 81 FL (ref 82–98)
METHADONE UR QL SCN>300 NG/ML: NEGATIVE
MONOCYTES # BLD AUTO: 0.4 K/UL (ref 0.3–1)
MONOCYTES NFR BLD: 8.5 % (ref 4–15)
NEUTROPHILS # BLD AUTO: 2.6 K/UL (ref 1.8–7.7)
NEUTROPHILS NFR BLD: 49.6 % (ref 38–73)
NITRITE UR QL STRIP: NEGATIVE
NRBC BLD-RTO: 0 /100 WBC
NT-PROBNP SERPL-MCNC: 875 PG/ML (ref 5–900)
OPIATES UR QL SCN: NEGATIVE
PCP UR QL SCN>25 NG/ML: NEGATIVE
PH UR STRIP: 6 [PH] (ref 5–8)
PLATELET # BLD AUTO: 323 K/UL (ref 150–450)
PMV BLD AUTO: 8.6 FL (ref 9.2–12.9)
POTASSIUM SERPL-SCNC: 3.8 MMOL/L (ref 3.5–5.1)
PROT SERPL-MCNC: 7.6 G/DL (ref 6–8.4)
PROT UR QL STRIP: NEGATIVE
RBC # BLD AUTO: 4.71 M/UL (ref 4.6–6.2)
SALICYLATES SERPL-MCNC: 7.9 MG/DL (ref 15–30)
SODIUM SERPL-SCNC: 144 MMOL/L (ref 136–145)
SP GR UR STRIP: 1.01 (ref 1–1.03)
TOXICOLOGY INFORMATION: ABNORMAL
TROPONIN I SERPL-MCNC: 0.02 NG/ML (ref 0.01–0.03)
URN SPEC COLLECT METH UR: ABNORMAL
UROBILINOGEN UR STRIP-ACNC: NEGATIVE EU/DL
UUN UR-MCNC: 17 MG/DL (ref 2–20)
WBC # BLD AUTO: 5.2 K/UL (ref 3.9–12.7)

## 2023-04-06 PROCEDURE — 80053 COMPREHEN METABOLIC PANEL: CPT | Mod: ER | Performed by: FAMILY MEDICINE

## 2023-04-06 PROCEDURE — 80307 DRUG TEST PRSMV CHEM ANLYZR: CPT | Mod: ER | Performed by: FAMILY MEDICINE

## 2023-04-06 PROCEDURE — 83880 ASSAY OF NATRIURETIC PEPTIDE: CPT | Mod: ER | Performed by: FAMILY MEDICINE

## 2023-04-06 PROCEDURE — 25000003 PHARM REV CODE 250: Mod: ER | Performed by: FAMILY MEDICINE

## 2023-04-06 PROCEDURE — 80143 DRUG ASSAY ACETAMINOPHEN: CPT | Mod: ER | Performed by: FAMILY MEDICINE

## 2023-04-06 PROCEDURE — 94760 N-INVAS EAR/PLS OXIMETRY 1: CPT | Mod: ER

## 2023-04-06 PROCEDURE — 25000003 PHARM REV CODE 250: Mod: ER | Performed by: EMERGENCY MEDICINE

## 2023-04-06 PROCEDURE — 93010 EKG 12-LEAD: ICD-10-PCS | Mod: ,,, | Performed by: INTERNAL MEDICINE

## 2023-04-06 PROCEDURE — 80179 DRUG ASSAY SALICYLATE: CPT | Mod: ER | Performed by: FAMILY MEDICINE

## 2023-04-06 PROCEDURE — 93010 ELECTROCARDIOGRAM REPORT: CPT | Mod: ,,, | Performed by: INTERNAL MEDICINE

## 2023-04-06 PROCEDURE — 82077 ASSAY SPEC XCP UR&BREATH IA: CPT | Mod: 91,ER | Performed by: EMERGENCY MEDICINE

## 2023-04-06 PROCEDURE — 96361 HYDRATE IV INFUSION ADD-ON: CPT | Mod: ER

## 2023-04-06 PROCEDURE — 93005 ELECTROCARDIOGRAM TRACING: CPT | Mod: ER

## 2023-04-06 PROCEDURE — 63600175 PHARM REV CODE 636 W HCPCS: Mod: ER | Performed by: FAMILY MEDICINE

## 2023-04-06 PROCEDURE — 84484 ASSAY OF TROPONIN QUANT: CPT | Mod: ER | Performed by: FAMILY MEDICINE

## 2023-04-06 PROCEDURE — 96375 TX/PRO/DX INJ NEW DRUG ADDON: CPT | Mod: ER

## 2023-04-06 PROCEDURE — 82077 ASSAY SPEC XCP UR&BREATH IA: CPT | Mod: ER | Performed by: FAMILY MEDICINE

## 2023-04-06 PROCEDURE — 81003 URINALYSIS AUTO W/O SCOPE: CPT | Mod: 59,ER | Performed by: FAMILY MEDICINE

## 2023-04-06 PROCEDURE — 96374 THER/PROPH/DIAG INJ IV PUSH: CPT | Mod: ER

## 2023-04-06 PROCEDURE — 99285 EMERGENCY DEPT VISIT HI MDM: CPT | Mod: 25,ER

## 2023-04-06 PROCEDURE — 85025 COMPLETE CBC W/AUTO DIFF WBC: CPT | Mod: ER | Performed by: FAMILY MEDICINE

## 2023-04-06 RX ORDER — FAMOTIDINE 10 MG/ML
20 INJECTION INTRAVENOUS
Status: COMPLETED | OUTPATIENT
Start: 2023-04-06 | End: 2023-04-06

## 2023-04-06 RX ORDER — LORAZEPAM 1 MG/1
2 TABLET ORAL
Status: COMPLETED | OUTPATIENT
Start: 2023-04-06 | End: 2023-04-06

## 2023-04-06 RX ORDER — ONDANSETRON 2 MG/ML
4 INJECTION INTRAMUSCULAR; INTRAVENOUS
Status: COMPLETED | OUTPATIENT
Start: 2023-04-06 | End: 2023-04-06

## 2023-04-06 RX ADMIN — FAMOTIDINE 20 MG: 10 INJECTION, SOLUTION INTRAVENOUS at 04:04

## 2023-04-06 RX ADMIN — SODIUM CHLORIDE 1000 ML: 0.9 INJECTION, SOLUTION INTRAVENOUS at 04:04

## 2023-04-06 RX ADMIN — ONDANSETRON 4 MG: 2 INJECTION INTRAMUSCULAR; INTRAVENOUS at 04:04

## 2023-04-06 RX ADMIN — LORAZEPAM 2 MG: 1 TABLET ORAL at 08:04

## 2023-04-06 NOTE — ED PROVIDER NOTES
"Encounter Date: 4/6/2023       History     Chief Complaint   Patient presents with    Alcohol Intoxication     [facility] outside of [facility], pt denies fall, denies loc, no complaints      72-year-old male was found intoxicated next to pop with [facility] area.  Patient has slurring of speech.  Moving all limbs but drowsy and sleepy.  Claims he has been drinking too much alcohol.  He was seen in ED yesterday for alcohol intoxication and were discharged in stable condition.    The history is provided by the EMS personnel.   Review of patient's allergies indicates:  No Known Allergies  Past Medical History:   Diagnosis Date    Gastric ulcer     Hypertension      Past Surgical History:   Procedure Laterality Date    COLONOSCOPY  3/11/14     repeat in 3 years dr montgomery     Family History   Problem Relation Age of Onset    Heart disease Mother     Stomach cancer Father     Kidney failure Father     Cancer Father         leukemia     Social History     Tobacco Use    Smoking status: Every Day     Packs/day: 0.50     Types: Cigarettes    Smokeless tobacco: Never   Substance Use Topics    Alcohol use: Yes     Alcohol/week: 35.0 standard drinks     Types: 35 Cans of beer per week     Comment: Pt was vague and reported "celebrating too much maybe"    Drug use: Never     Review of Systems    Physical Exam     Initial Vitals   BP Pulse Resp Temp SpO2   04/06/23 1403 04/06/23 1403 04/06/23 1403 04/06/23 1912 04/06/23 1403   (!) 173/83 98 18 98.3 °F (36.8 °C) 95 %      MAP       --                Physical Exam    Nursing note and vitals reviewed.  Constitutional: Vital signs are normal. He appears well-developed and well-nourished. He is active. No distress.   HENT:   Head: Normocephalic.   Nose: Nose normal.   Mouth/Throat: Oropharynx is clear and moist and mucous membranes are normal.   Eyes: Conjunctivae, EOM and lids are normal.   Neck: Neck supple.   Normal range of " motion.  Cardiovascular:  Normal rate, regular rhythm, S1 normal, S2 normal and normal heart sounds.           Pulmonary/Chest: Breath sounds normal. No respiratory distress. He has no wheezes. He has no rhonchi. He has no rales.   Abdominal: Abdomen is soft. Bowel sounds are normal. He exhibits no distension. There is no abdominal tenderness. There is no rebound and no guarding.   Musculoskeletal:      Right upper arm: Normal.      Left upper arm: Normal.      Cervical back: Normal range of motion and neck supple.      Right lower leg: Normal.      Left lower leg: Normal.     Neurological: He is alert. He has normal strength. GCS score is 15. GCS eye subscore is 4. GCS verbal subscore is 5. GCS motor subscore is 6.   Patient is drowsy and sleepy.  Slurring of speech.  No facial deviation.   Skin: Skin is warm. Capillary refill takes less than 2 seconds.   Psychiatric: He has a normal mood and affect. His speech is normal and behavior is normal. Thought content normal. Cognition and memory are normal.       ED Course   Procedures  Labs Reviewed   CBC W/ AUTO DIFFERENTIAL - Abnormal; Notable for the following components:       Result Value    Hemoglobin 12.2 (*)     Hematocrit 38.0 (*)     MCV 81 (*)     MCH 25.9 (*)     RDW 16.3 (*)     MPV 8.6 (*)     All other components within normal limits   URINALYSIS, REFLEX TO URINE CULTURE - Abnormal; Notable for the following components:    Occult Blood UA Trace (*)     All other components within normal limits    Narrative:     Preferred Collection Type->Urine, Clean Catch  Specimen Source->Urine   ALCOHOL,MEDICAL (ETHANOL) - Abnormal; Notable for the following components:    Alcohol, Serum 284 (*)     All other components within normal limits   DRUG SCREEN PANEL, URINE EMERGENCY - Abnormal; Notable for the following components:    Benzodiazepines Presumptive Positive (*)     All other components within normal limits    Narrative:     Preferred Collection Type->Urine,  Clean Catch  Specimen Source->Urine   SALICYLATE LEVEL - Abnormal; Notable for the following components:    Salicylate Lvl 7.9 (*)     All other components within normal limits   ALCOHOL,MEDICAL (ETHANOL) - Abnormal; Notable for the following components:    Alcohol, Serum 133 (*)     All other components within normal limits   COMPREHENSIVE METABOLIC PANEL   NT-PRO NATRIURETIC PEPTIDE   TROPONIN I   ACETAMINOPHEN LEVEL   SALICYLATE LEVEL   ACETAMINOPHEN LEVEL        ECG Results              EKG 12-lead (Final result)  Result time 04/07/23 11:46:05      Final result by Interface, Lab In [facility] (04/07/23 11:46:05)                   Narrative:    Test Reason : R41.82,    Vent. Rate : 079 BPM     Atrial Rate : 079 BPM     P-R Int : 166 ms          QRS Dur : 080 ms      QT Int : 406 ms       P-R-T Axes : 083 -44 079 degrees     QTc Int : 465 ms    Normal sinus rhythm  Left axis deviation  Septal infarct (cited on or before 29-MAY-2022)  Abnormal ECG  When compared with ECG of 29-MAY-2022 00:57,  Questionable change in initial forces of Septal leads  Nonspecific T wave abnormality no longer evident in Anterior leads  Confirmed by Dariel PAZ MD, Diony HICKMAN ([id number]) on 4/7/2023 11:45:41 AM    Referred By: [email or web address]   SELF           Confirmed By:Diony Vieira III, MD                                  Imaging Results              X-Ray Chest AP Portable (Final result)  Result time 04/06/23 15:36:12      Final result by MICHAEL Pederson Sr., MD (04/06/23 15:36:12)                   Impression:      1. There is no focal pulmonary infiltrate visualized.  2. The size of the heart is prominent.  This may be secondary to magnification.  3. The superior aspect of the thoracic spine is tilted towards the left.  This may be positional.  .      Electronically signed by: Niraj Pederson MD  Date:    04/06/2023  Time:    15:36               Narrative:    EXAMINATION:  XR CHEST AP PORTABLE    CLINICAL  HISTORY:  ams;    COMPARISON:  None    FINDINGS:  The size of the heart is prominent.  There is no focal pulmonary infiltrate visualized.  There is no pneumothorax.  The costophrenic angles are sharp.  The superior aspect of the thoracic spine is tilted towards the left.                                       CT Cervical Spine Without Contrast (Final result)  Result time 04/06/23 15:31:05      Final result by Hayden Sauceda MD (04/06/23 15:31:05)                   Impression:      No acute abnormality identified.    All CT scans at this facility use dose modulation, iterative reconstruction, and/or weight based dosing when appropriate to reduce radiation dose to as low as reasonably achievable.      Electronically signed by: Hayden Sauceda  Date:    04/06/2023  Time:    15:31               Narrative:    EXAMINATION:  CT CERVICAL SPINE WITHOUT CONTRAST    CLINICAL HISTORY:  Neck trauma (Age >= 65y);    TECHNIQUE:  Low dose axial images, sagittal and coronal reformations were performed though the cervical spine.  Contrast was not administered.    COMPARISON:  None    FINDINGS:  Lung apices unremarkable.  No evidence of fracture or dislocation.  Odontoid intact.  No prevertebral soft tissue swelling.  Craniocervical junction normal.  Thyroid and perivertebral soft tissues are unremarkable.  Moderate severity multilevel discogenic degenerative change with lateral mass facet arthropathy, and multilevel mild-to-moderate neural foraminal stenoses.                                       CT Head Without Contrast (Final result)  Result time 04/06/23 15:34:26      Final result by MICHAEL Pederson Sr., MD (04/06/23 15:34:26)                   Impression:      1. There is no evidence of an acute ischemic event. There are mild chronic appearing ischemic changes in both cerebral hemispheres.  2. There is no intracranial hemorrhage.  3. There is mild partial opacification of the right ethmoidal sinus.  This is characteristic of  sinusitis.  4. There is moderate mucosal sinus thickening in both maxillary sinuses. The thickening in the right maxillary sinus measures 9 mm.  All CT scans at this facility use dose modulation, iterative reconstruction, and/or weight base dosing when appropriate to reduce radiation dose when appropriate to reduce radiation dose to as low as reasonably achievable.      Electronically signed by: Niraj Pederson MD  Date:    04/06/2023  Time:    15:34               Narrative:    EXAMINATION:  CT HEAD WITHOUT CONTRAST    CLINICAL HISTORY:  Mental status change, unknown cause;    TECHNIQUE:  Standard brain CT protocol without IV contrast was performed.    COMPARISON:  05/29/2022    FINDINGS:  There are mild chronic appearing ischemic changes in both cerebral hemispheres.  There is no evidence of an acute ischemic event.  There is no intracranial hemorrhage.  There is no calvarial fracture.  There is mild partial opacification of the right ethmoidal sinus.  There is moderate mucosal sinus thickening in both maxillary sinuses.  The thickening in the right maxillary sinus measures 9 mm.                                       Medications   famotidine (PF) injection 20 mg (20 mg Intravenous Given 4/6/23 1610)   ondansetron injection 4 mg (4 mg Intravenous Given 4/6/23 1610)   sodium chloride 0.9% bolus 1,000 mL 1,000 mL (0 mLs Intravenous Stopped 4/6/23 1740)   LORazepam tablet 2 mg (2 mg Oral Given 4/6/23 2016)     Medical Decision Making:   Initial Assessment:   Alcohol intoxication with fall and altered mental status, slurring of speech.  Differential Diagnosis:   Alcohol intoxication, altered mental status, CVA, head injury, neck injury.    Clinical Tests:   Lab Tests: Ordered and Reviewed  The following lab test(s) were unremarkable: CBC and CMP       <> Summary of Lab: Alcohol level 284.   ED Management:  CT of head and neck no acute findings.  Alcohol intoxication.  Repeated ER visits.  Gravely disabled.  With alcohol  addiction.  Patient is placed on PEC.   Awaiting medical clearance.  Patient turned over to Dr. De Guzman at shift change.           ED Course as of 04/11/23 1824   Thu Apr 06, 2023   2255 Patient is medically cleared for psychiatric admission [LD]   Fri Apr 07, 2023   0058 Patient is medically cleared and will be transferred to [facility] [LD]      ED Course User Index  [LD] Keesha De Guzman MD       Medically cleared for psychiatry placement: 4/6/2023 10:54 PM         Clinical Impression:   Final diagnoses:  [R41.82] AMS (altered mental status)  [F10.929] Acute alcoholic intoxication with complication (Primary)        ED Disposition Condition    Transfer to Psych Facility Stable          ED Prescriptions    None       Follow-up Information    None          Calixto Elizabeth MD  04/06/23 1739       Calixto Elizabeth MD  04/11/23 1828

## 2023-04-06 NOTE — ED NOTES
PEC scanned into pts chart per registration.  [place] 's office contacted to notify of pts PEC status.

## 2023-04-06 NOTE — ED NOTES
Pt undressed into facility approved apparel , pt was able to collect a urinal sample while changing. All belongings placed in secure location

## 2023-04-07 VITALS
SYSTOLIC BLOOD PRESSURE: 162 MMHG | TEMPERATURE: 98 F | WEIGHT: 120 LBS | BODY MASS INDEX: 18.19 KG/M2 | OXYGEN SATURATION: 94 % | RESPIRATION RATE: 17 BRPM | HEIGHT: 68 IN | DIASTOLIC BLOOD PRESSURE: 80 MMHG | HEART RATE: 86 BPM

## 2023-04-07 NOTE — ED NOTES
Report received from SPIKE Villar RN  Assumed care of pt  Removed two silver rings from patient's finger and placed in belongings bag.   Pt is AAOx3 and resting on stretcher c respirations even, unlabored. NADN  PCT at bedside for 1:1 obs

## 2023-04-07 NOTE — ED NOTES
Patient accepted by Haylie at [facility] for the service of Dr. Mckeon. Report to be called to [phone]. [facility] will arrange transport.

## 2023-04-07 NOTE — ED NOTES
"Per Evelyn, "Patient accepted by Haylie at [facility] for the service of Dr. Hardin. Report to be called to [phone]. [facility] will arrange transport."  "

## 2023-04-07 NOTE — ED NOTES
Addended by: AMADOU STINSON on: 2/9/2022 09:31 AM     Modules accepted: Orders     Pt remain sleeping on stretcher c respirations even, unlabored c NADN  PCT at bedside for 1:1 obs

## 2023-04-11 ENCOUNTER — TELEPHONE (OUTPATIENT)
Dept: FAMILY MEDICINE | Facility: CLINIC | Age: 73
End: 2023-04-11
Payer: MEDICARE

## 2023-04-11 NOTE — TELEPHONE ENCOUNTER
----- Message from Jany Hitchcock sent at 4/11/2023  3:27 PM CDT -----  Regarding: [facility]  Contact: Christy[facility] /[phone] ext [phone]  Christy[facility] is requesting to schedule an appointment. He is discharging  tomorrow and needs to be seen within 7 to 10 days.   Would the patient rather a call back or a response via [facility]?   Best Call Back Number:  Christy[facility] /[phone] ext [phone]  Additional Information:  she leaves work at 4:30pm

## 2023-04-24 ENCOUNTER — HOSPITAL ENCOUNTER (EMERGENCY)
Facility: HOSPITAL | Age: 73
Discharge: HOME | End: 2023-04-24
Attending: EMERGENCY MEDICINE
Payer: MEDICARE

## 2023-04-24 VITALS
HEIGHT: 68 IN | SYSTOLIC BLOOD PRESSURE: 168 MMHG | BODY MASS INDEX: 20.46 KG/M2 | HEART RATE: 79 BPM | RESPIRATION RATE: 17 BRPM | WEIGHT: 135 LBS | DIASTOLIC BLOOD PRESSURE: 77 MMHG | OXYGEN SATURATION: 98 % | TEMPERATURE: 98 F

## 2023-04-24 DIAGNOSIS — F10.90 ALCOHOL USE DISORDER: Primary | ICD-10-CM

## 2023-04-24 PROCEDURE — 99283 EMERGENCY DEPT VISIT LOW MDM: CPT | Mod: HCNC,ER

## 2023-04-25 ENCOUNTER — OUTPATIENT CASE MANAGEMENT (OUTPATIENT)
Dept: ADMINISTRATIVE | Facility: OTHER | Age: 73
End: 2023-04-25
Payer: MEDICARE

## 2023-04-25 NOTE — ED NOTES
[facility] answering service contacted in order to verify if pt currently resides in facility. Facility paged per answering service ; awaiting return phone call.

## 2023-04-25 NOTE — ED NOTES
Pt is alert and states his plan was to walk home from the store. He knows the area well and walks around normally.

## 2023-04-25 NOTE — LETTER
April 25, 2023    Gama Huertas  [address]  [place] [address]             [facility] [address] [place] [address] Dear  Gama Wagnerer:     I am a nurse  with [facility] program.  The program is free and is made up of nurses and social workers who help connect patients to resources and education that can help them in managing their health.     We received a referral through the [facility] system  to offer assistance if needed. If you receive this letter and have questions or concerns, please don't hesitate to call. I will be glad to speak with you.     Sincerely,        Janis Park RN  [facility]  TEL:  [phone]

## 2023-04-25 NOTE — ED NOTES
Pt walked down the zhou and back with a steady gait he states he feels fine to go home. MD aware of gait and ambulation trial. Brought in new pair of mesh underwear and a powerade.

## 2023-04-25 NOTE — ED NOTES
Pt resting on stretcher, call light in reach and placed on monitor. MD at bedside states that if patient is able to find a ride he can go home. Per patient his family is in between and cant call them at this time.

## 2023-04-25 NOTE — PROGRESS NOTES
04/25/23- Attempted to contact patient for enrollment into OPCM program. Unable to reach patient, sending UTR letter

## 2023-04-25 NOTE — ED PROVIDER NOTES
"Encounter Date: 4/24/2023       History     Chief Complaint   Patient presents with    Alcohol Intoxication     Reports to ED via EMS for public intoxication.  Per EMS, "[facility] said they really didn't want to arrest him so they sent him with us." Pt is AAOX4.      HPI  72 y.o.   Sent here via EMS for public intoxication    Denies SI HI    Denies trauma  Blood sugar was in 80s    Review of patient's allergies indicates:  No Known Allergies  Past Medical History:   Diagnosis Date    Gastric ulcer     Hypertension      Past Surgical History:   Procedure Laterality Date    COLONOSCOPY  3/11/14     repeat in 3 years dr montgomery     Family History   Problem Relation Age of Onset    Heart disease Mother     Stomach cancer Father     Kidney failure Father     Cancer Father         leukemia     Social History     Tobacco Use    Smoking status: Every Day     Packs/day: 0.50     Types: Cigarettes    Smokeless tobacco: Never   Substance Use Topics    Alcohol use: Yes     Alcohol/week: 35.0 standard drinks     Types: 35 Cans of beer per week     Comment: Pt was vague and reported "celebrating too much maybe"    Drug use: Never     Review of Systems  All systems were reviewed/examined and were negative except as noted in the HPI.    Physical Exam     Initial Vitals [04/24/23 1937]   BP Pulse Resp Temp SpO2   (!) 185/86 85 19 98.3 °F (36.8 °C) 99 %      MAP       --         Physical Exam    General: the patient is awake, alert, and in no apparent distress.    Mildly intoxicated, answers to voice, answers questions, sts he's staying in motel  Head: normocephalic and atraumatic, sclera are clear  Neck: supple without meningismus  Chest: clear to auscultation bilaterally, no respiratory distress  Heart: regular rate and rhythm  ABD soft, nontender, nondistended, no peritoneal signs  Extremities: warm and well perfused  Skin: warm and dry  Psych conversant  no SI HI  Neuro: awake, alert, moving all extremities    ED Course "   Procedures  Labs Reviewed - No data to display       Imaging Results    None          Medications - No data to display                 Medical Decision Making:    This is an emergent evaluation of a patient presenting to the ED.  Nursing notes were reviewed.    I decided to obtain and review old medical records, which showed: multiple episodes alcohol ED visits    Evaluation for Emergency Medical Condition  The patient received a medical screening exam and within a reasonable degree of clinical confidence an emergency medical condition has not been identified.  The patient is instructed on proper follow up and return precautions to the ED.    Resources  No indication for PEC  Walking and talking w/o issue  Wants to go home      Alphonse David MD, SHANTA              Clinical Impression:   Final diagnoses:  [F10.90] Alcohol use disorder (Primary)        ED Disposition Condition    Discharge Stable          ED Prescriptions    None       Follow-up Information       Follow up With Specialties Details Why Contact Info    Richard Meléndez MD Family Medicine Schedule an appointment as soon as possible for a visit   [address] [place] [address]  [phone]            Discharged to home in stable condition, return to ED warnings given, follow up and patient care instructions given.      Alphonse David MD, SHANTA, [facility]  Department of Emergency Medicine       Gama David MD  04/26/23 0837

## 2023-04-27 ENCOUNTER — PATIENT OUTREACH (OUTPATIENT)
Dept: ADMINISTRATIVE | Facility: OTHER | Age: 73
End: 2023-04-27
Payer: MEDICARE

## 2023-04-27 NOTE — PROGRESS NOTES
CHW - Case Closure    This Community Health Worker spoke to MOLLY Wells over the phone today.   Pt/Caregiver reported: Patient has been rescheduled for PCP appointment. No additional assistance is required at this time.  Pt/Caregiver denied any additional needs at this time and agrees with episode closure at this time.  Provided MOLLY Wells with Community Health Worker's contact information and encouraged him/her to contact this Community Health Worker if additional needs arise.

## 2023-05-01 ENCOUNTER — OUTPATIENT CASE MANAGEMENT (OUTPATIENT)
Dept: ADMINISTRATIVE | Facility: OTHER | Age: 73
End: 2023-05-01
Payer: MEDICARE

## 2023-05-12 ENCOUNTER — TELEPHONE (OUTPATIENT)
Dept: GASTROENTEROLOGY | Facility: CLINIC | Age: 73
End: 2023-05-12
Payer: MEDICARE

## 2023-05-12 NOTE — LETTER
May 12, 2023    Gama Huertas  [address]  [place] [address]             [facility]  [address], [place]   [place] [address]  Phone: [phone]  Fax: [phone] Dear Mr. Huertas:    We have attempted to contact you to schedule a screening colonoscopy that was ordered by your doctor. Please contact the office to schedule at [phone].       If you have any questions or concerns, please don't hesitate to call.    Sincerely,        Reyna St MD

## 2023-05-24 ENCOUNTER — TELEPHONE (OUTPATIENT)
Dept: FAMILY MEDICINE | Facility: CLINIC | Age: 73
End: 2023-05-24
Payer: MEDICARE

## 2023-05-24 NOTE — TELEPHONE ENCOUNTER
----- Message from Larisa Crews sent at 5/24/2023  1:56 PM CDT -----  Regarding: medication refill  Contact: [facility]  [facility] to get an rx from Dr Meléndez for Lexapro 5mg.  They do not have a record of this on file.  Please send to [facility] and call pt to let him know that this has been done.

## 2023-05-25 ENCOUNTER — TELEPHONE (OUTPATIENT)
Dept: FAMILY MEDICINE | Facility: CLINIC | Age: 73
End: 2023-05-25
Payer: MEDICARE

## 2023-05-25 DIAGNOSIS — D50.0 IRON DEFICIENCY ANEMIA DUE TO CHRONIC BLOOD LOSS: ICD-10-CM

## 2023-05-25 NOTE — TELEPHONE ENCOUNTER
FRANM for pt to return call     Pt requesting anxiety and depression medications    Attempted to contact pt for more info about which meds he needs

## 2023-05-25 NOTE — TELEPHONE ENCOUNTER
Needs to RTC         Note      You routed conversation to Richard Meléndez MD Yesterday (2:27 PM)     You Yesterday (2:27 PM)     TM  ----- Message from Larisa Crews sent at 5/24/2023  1:56 PM CDT -----  Regarding: medication refill  Contact: [facility]  [facility] called to get an rx from Dr Meléndez for Lexapro 5mg.  They do not have a record of this on file.  Please send to [facility] and call pt to let him know that this has been done.

## 2023-05-25 NOTE — TELEPHONE ENCOUNTER
----- Message from Rajat Chino sent at 5/25/2023 10:38 AM CDT -----  Contact: pt  .Type:  RX Refill Request    Who Called: Pt  Refill or New Rx:refill  RX Name and Strength:anxiety and depression meds  Preferred Pharmacy with phone number:[id number] [place]  [address] [place] [address]  Local or Mail Order:local  Ordering Provider:Rika  Would the patient rather a call back or a response via [facility]? call  Best Call Back Number:[phone]  Additional Information:   Pt requested a call when the medication has been sent.

## 2023-05-29 ENCOUNTER — TELEPHONE (OUTPATIENT)
Dept: FAMILY MEDICINE | Facility: CLINIC | Age: 73
End: 2023-05-29
Payer: MEDICARE

## 2023-05-30 NOTE — TELEPHONE ENCOUNTER
Bo Ramos Staff  Caller: Pt (Today, 10:11 AM)  Type:  Needs Medical Advice     Who Called: pt     Pharmacy name and phone #:  [facility]   Phone: [phone]   Fax: [phone]     Would the patient rather a call back or a response via [facility]?  Call back   Best Call Back Number: [phone]   Additional Information: pt. Is requesting a refill on his anxiety medication and  folic acid tablet 1 mg      Yes

## 2023-06-01 NOTE — TELEPHONE ENCOUNTER
Pt called in. He explained that he is in need of medications to be refilled.    Lexapro 5mg tablet ( not on med list )  2.  folic acid tablet 1 mg    3.  gabapation 300 mg cap. ( Not on med list)     Pt has an appt scheduled on 08/23/23 as he was here in Feb and you wanted to see him in 6 months.

## 2023-06-01 NOTE — TELEPHONE ENCOUNTER
Bo Ramos Staff  Caller: Pt (Today,  9:12 AM)  .Type:  Needs Medical Advice     Who Called: pt. Friend Radha     Pharmacy name and phone #:  [facility]  [address] [place] [address]   Phone: [phone]   Fax: [phone]   Would the patient rather a call back or a response via [facility]?  Call back   Best Call Back Number: [phone]   Additional Information: Pt. Is calling regarding his refill on his     Lexapro 5mg tablet ( not on med list )  2.  folic acid tablet 1 mg    3.  gabapation 300 mg cap. ( Not on med list)     Pt. Friend is calling and he is very upset that no one has being helping him. I did inform the friend that someone has being calling them and they are not answering the phone.           Pt needs refills

## 2023-06-02 RX ORDER — FOLIC ACID 1 MG/1
1 TABLET ORAL DAILY
Qty: 100 TABLET | Refills: 11 | Status: SHIPPED | OUTPATIENT
Start: 2023-06-02 | End: 2024-06-01

## 2023-06-02 RX ORDER — GABAPENTIN 300 MG/1
300 CAPSULE ORAL 2 TIMES DAILY
Qty: 200 CAPSULE | Refills: 11 | Status: SHIPPED | OUTPATIENT
Start: 2023-06-02 | End: 2023-08-23 | Stop reason: SDUPTHER

## 2023-06-02 RX ORDER — ESCITALOPRAM OXALATE 5 MG/1
5 TABLET ORAL DAILY
Qty: 100 TABLET | Refills: 11 | Status: SHIPPED | OUTPATIENT
Start: 2023-06-02 | End: 2024-06-01

## 2023-06-08 DIAGNOSIS — Z86.010 HISTORY OF COLON POLYPS: ICD-10-CM

## 2023-06-08 DIAGNOSIS — K25.4 CHRONIC GASTRIC ULCER WITH HEMORRHAGE: ICD-10-CM

## 2023-06-08 DIAGNOSIS — K21.9 GASTROESOPHAGEAL REFLUX DISEASE: ICD-10-CM

## 2023-06-08 DIAGNOSIS — D50.0 IRON DEFICIENCY ANEMIA DUE TO CHRONIC BLOOD LOSS: ICD-10-CM

## 2023-06-08 DIAGNOSIS — I10 ESSENTIAL HYPERTENSION: ICD-10-CM

## 2023-06-08 DIAGNOSIS — Z00.00 WELLNESS EXAMINATION: ICD-10-CM

## 2023-06-08 DIAGNOSIS — R63.4 WEIGHT LOSS: ICD-10-CM

## 2023-06-08 DIAGNOSIS — D50.9 MICROCYTIC ANEMIA: ICD-10-CM

## 2023-06-08 DIAGNOSIS — R97.20 PSA ELEVATION: ICD-10-CM

## 2023-06-08 NOTE — TELEPHONE ENCOUNTER
No care due was identified.  [facility] Embedded Care Due Messages. Reference number: [id number].   6/08/2023 12:29:30 PM CDT

## 2023-06-08 NOTE — TELEPHONE ENCOUNTER
----- Message from Miles Gonzalez sent at 6/8/2023 11:49 AM CDT -----  Contact: pt  Type: Requesting refill    Who Called: PT  Regarding: amLODIPine (NORVASC) 10 MG tablet 90 tablet   Sig - Route: Take 1 tablet (10 mg total) by mouth once daily. - Oral    Would the patient rather a call back or a response via [facility]? Call back  Best Call Back Number: [phone]  Pharmacy Information:  [facility] #[facility] [place] [address]   Phone: [phone]  Fax:  [phone]

## 2023-06-09 RX ORDER — AMLODIPINE BESYLATE 10 MG/1
10 TABLET ORAL DAILY
Qty: 30 TABLET | Refills: 0 | Status: SHIPPED | OUTPATIENT
Start: 2023-06-09 | End: 2023-08-02

## 2023-06-21 ENCOUNTER — TELEPHONE (OUTPATIENT)
Dept: FAMILY MEDICINE | Facility: CLINIC | Age: 73
End: 2023-06-21
Payer: MEDICARE

## 2023-06-21 NOTE — TELEPHONE ENCOUNTER
----- Message from Miles Gonzalez sent at 6/21/2023 11:57 AM CDT -----  Contact: pt's friend  Type: Requesting to speak with nurse        Who Called: PT's friend   Regarding: depression medicine.  Would the patient rather a call back or a response via [facility]? Call back  Best Call Back Number: [phone]  Additional Information:  [id number]   Phone: [phone]  Fax:  [phone]        I LM for the pt to rtn call to discuss further

## 2023-07-10 ENCOUNTER — HOSPITAL ENCOUNTER (EMERGENCY)
Facility: HOSPITAL | Age: 73
Discharge: HOME | End: 2023-07-11
Attending: EMERGENCY MEDICINE
Payer: MEDICARE

## 2023-07-10 DIAGNOSIS — F10.929 ALCOHOLIC INTOXICATION WITH COMPLICATION: Primary | ICD-10-CM

## 2023-07-10 DIAGNOSIS — S09.90XA MINOR HEAD INJURY, INITIAL ENCOUNTER: ICD-10-CM

## 2023-07-10 PROCEDURE — 99284 EMERGENCY DEPT VISIT MOD MDM: CPT | Mod: 25,ER

## 2023-07-11 ENCOUNTER — HOSPITAL ENCOUNTER (EMERGENCY)
Facility: HOSPITAL | Age: 73
Discharge: HOME | End: 2023-07-12
Attending: EMERGENCY MEDICINE
Payer: MEDICARE

## 2023-07-11 VITALS
HEART RATE: 82 BPM | BODY MASS INDEX: 20.46 KG/M2 | SYSTOLIC BLOOD PRESSURE: 163 MMHG | HEIGHT: 68 IN | DIASTOLIC BLOOD PRESSURE: 83 MMHG | TEMPERATURE: 98 F | RESPIRATION RATE: 20 BRPM | WEIGHT: 135 LBS | OXYGEN SATURATION: 99 %

## 2023-07-11 DIAGNOSIS — F10.920 ALCOHOLIC INTOXICATION WITHOUT COMPLICATION: Primary | ICD-10-CM

## 2023-07-11 PROCEDURE — 99283 EMERGENCY DEPT VISIT LOW MDM: CPT | Mod: ER

## 2023-07-11 NOTE — ED PROVIDER NOTES
"ED Provider Note - 7/10/2023    History     Chief Complaint   Patient presents with    Alcohol Intoxication     Reports to ED c c/o alcohol intoxication. Per EMS, pt is trying to get back to [place] to his boyfriend; however, had too much to drink.      Patient currently presents via EMS had found by law enforcement.  Patient with reported the drinking heavily earlier today intoxicated.  Patient has had a number of encounters without send has.  Patient has no complaints at present.  VS and glucose unremarkable per EMS.  It does appear that the patient had a fall at some point during the evening.  He has some ecchymosis noted to the frontal scalp.  Patient denies any loss of consciousness.    Review of patient's allergies indicates:  No Known Allergies  Past Medical History:   Diagnosis Date    Gastric ulcer     Hypertension      Past Surgical History:   Procedure Laterality Date    COLONOSCOPY  3/11/14     repeat in 3 years dr montgomery     Family History   Problem Relation Age of Onset    Heart disease Mother     Stomach cancer Father     Kidney failure Father     Cancer Father         leukemia     Social History     Tobacco Use    Smoking status: Every Day     Packs/day: 0.50     Types: Cigarettes    Smokeless tobacco: Never   Substance Use Topics    Alcohol use: Yes     Alcohol/week: 35.0 standard drinks     Types: 35 Cans of beer per week     Comment: Pt was vague and reported "celebrating too much maybe"    Drug use: Never     Review of Systems   Constitutional:  Negative for chills and fever.   HENT:  Negative for congestion and sore throat.    Respiratory:  Negative for chest tightness and shortness of breath.    Cardiovascular:  Negative for chest pain and palpitations.   Gastrointestinal:  Negative for abdominal pain and vomiting.   Genitourinary:  Negative for difficulty urinating and dysuria.   Skin:  Negative for color change and rash.   Neurological:  Negative for weakness and numbness.     Physical " "Exam     Initial Vitals [07/10/23 2327]   BP Pulse Resp Temp SpO2   (!) 163/83 82 20 97.6 °F (36.4 °C) 99 %      MAP       --         Vitals:    07/10/23 2327 07/11/23 0003   BP: (!) 163/83    Pulse: 82    Resp: 20    Temp: 97.6 °F (36.4 °C)    TempSrc: Oral    SpO2: 99%    Weight: 61.2 kg (135 lb)    Height:  5' 8" (1.727 m)     Physical Exam    Nursing note and vitals reviewed.  Constitutional: He appears well-developed and well-nourished. He is not diaphoretic. No distress.   HENT:   Head: Normocephalic and atraumatic. Head is without raccoon's eyes and without Carbone's sign.       Nose: Nose normal.   Mouth/Throat: Oropharynx is clear and moist.   Eyes: Conjunctivae are normal. No scleral icterus.   Cardiovascular:  Normal rate, regular rhythm, normal heart sounds and intact distal pulses.           Pulmonary/Chest: Breath sounds normal. No respiratory distress.   Abdominal: Abdomen is soft. There is no abdominal tenderness.   Musculoskeletal:         General: No edema. Normal range of motion.     Neurological: He is alert and oriented to person, place, and time. He has normal strength. No cranial nerve deficit. GCS score is 15. GCS eye subscore is 4. GCS verbal subscore is 5. GCS motor subscore is 6.   Skin: Skin is warm and dry.       ED Course   Procedures                   MDM  Differential Diagnoses   Based on available history, the working differential diagnoses considered during this evaluation include but are not limited to scalp contusion/hematoma, concussion, skull fracture, intracranial hemorrhage..      LABS   Labs Reviewed - No data to display      Imaging     Imaging Results              CT Head Without Contrast (Final result)  Result time 07/11/23 07:09:58      Final result by Kojo Griffin MD (07/11/23 07:09:58)                   Impression:      No acute intracranial finding.    [place]berta stroke programme early CT score (ASPECTS): 10      Electronically signed by: Kojo " Gaby  Date:    07/11/2023  Time:    07:09               Narrative:    EXAMINATION:  CT HEAD WITHOUT CONTRAST    CLINICAL HISTORY:  Head trauma, minor (Age >= 65y);    TECHNIQUE:  Low dose axial CT images obtained throughout the head without intravenous contrast. Sagittal and coronal reconstructions were performed.  The CT exam was performed using one or more of the following dose reduction techniques- Automated exposure control, adjustment of the mA and/or kV according to patient size, and/or use of iterative reconstructed technique.    COMPARISON:  None available.    FINDINGS:  Intracranial compartment:    Ventricles and sulci are normal in size for age without evidence of hydrocephalus. No extra-axial blood or fluid collections.    Chronic microvascular ischemic changes.  No parenchymal mass, hemorrhage, edema or major vascular distribution infarct.    Skull/extracranial contents (limited evaluation): No fracture. Mastoid air cells and paranasal sinuses are essentially clear.                                         EKG        ED Management/Discussion   Medications - No data to display                The patient's list of active medical problems, social history, medications, and allergies as documented per RN staff has been reviewed.                 We elected to perform CT of the head secondary to the patient's advanced age and apparent intoxication though he remains neurologically intact aside from some mildly speech.    On final assessment, the patient appears well and comfortable for discharge.  I see no indication of an emergent process beyond that addressed during our encounter but have duly counseled the patient/family regarding the need for prompt follow-up as well as the indications that should prompt immediate return to the emergency room should new or worrisome developments occur.  The patient/family has been provided with verbal and printed direction regarding our final diagnosis(es) as well as  instructions regarding use of OTC and/or Rx medications intended to manage the patient's aforementioned conditions including:  ED Prescriptions    None           Patient has been advised of the following recommended follow-up instructions:  Follow-up Information       Follow up With Specialties Details Why Contact Info    Richard Meléndez MD Family Medicine Schedule an appointment as soon as possible for a visit  for reassessment [address] [place] [address] [phone]      [facility] - Emergency Dept Emergency Medicine Go to  As needed, If symptoms worsen [address] [facility] [address] [phone]          The patient/family communicates understanding of all this information and all remaining questions and concerns were addressed at this time.      Referrals:  No orders of the defined types were placed in this encounter.      CLINICAL IMPRESSION    ICD-10-CM ICD-9-CM   1. Alcoholic intoxication with complication  F10.929 305.00   2. Minor head injury, initial encounter  S09.90XA 959.01          ED Disposition Condition    Discharge Stable               Jatin Davalos MD  07/11/23 1141

## 2023-07-12 VITALS
OXYGEN SATURATION: 97 % | TEMPERATURE: 98 F | HEIGHT: 68 IN | WEIGHT: 135 LBS | RESPIRATION RATE: 10 BRPM | SYSTOLIC BLOOD PRESSURE: 174 MMHG | HEART RATE: 58 BPM | BODY MASS INDEX: 20.46 KG/M2 | DIASTOLIC BLOOD PRESSURE: 79 MMHG

## 2023-07-12 LAB
ANION GAP SERPL CALC-SCNC: 14 MMOL/L (ref 8–16)
BASOPHILS # BLD AUTO: 0.06 K/UL (ref 0–0.2)
BASOPHILS NFR BLD: 1 % (ref 0–1.9)
CALCIUM SERPL-MCNC: 9.1 MG/DL (ref 8.7–10.5)
CHLORIDE SERPL-SCNC: 104 MMOL/L (ref 95–110)
CO2 SERPL-SCNC: 27 MMOL/L (ref 23–29)
CREAT SERPL-MCNC: 0.66 MG/DL (ref 0.5–1.4)
DIFFERENTIAL METHOD: ABNORMAL
EOSINOPHIL # BLD AUTO: 0.1 K/UL (ref 0–0.5)
EOSINOPHIL NFR BLD: 0.8 % (ref 0–8)
ERYTHROCYTE [DISTWIDTH] IN BLOOD BY AUTOMATED COUNT: 18.2 % (ref 11.5–14.5)
EST. GFR  (NO RACE VARIABLE): >60 ML/MIN/1.73 M^2
ETHANOL SERPL-MCNC: 217 MG/DL
GLUCOSE SERPL-MCNC: 83 MG/DL (ref 70–110)
HCT VFR BLD AUTO: 46.7 % (ref 40–54)
HGB BLD-MCNC: 15.1 G/DL (ref 14–18)
IMM GRANULOCYTES # BLD AUTO: 0.03 K/UL (ref 0–0.04)
IMM GRANULOCYTES NFR BLD AUTO: 0.5 % (ref 0–0.5)
LYMPHOCYTES # BLD AUTO: 2 K/UL (ref 1–4.8)
LYMPHOCYTES NFR BLD: 33.7 % (ref 18–48)
MCH RBC QN AUTO: 25.8 PG (ref 27–31)
MCHC RBC AUTO-ENTMCNC: 32.3 G/DL (ref 32–36)
MCV RBC AUTO: 80 FL (ref 82–98)
MONOCYTES # BLD AUTO: 0.5 K/UL (ref 0.3–1)
MONOCYTES NFR BLD: 9 % (ref 4–15)
NEUTROPHILS # BLD AUTO: 3.3 K/UL (ref 1.8–7.7)
NEUTROPHILS NFR BLD: 55 % (ref 38–73)
NRBC BLD-RTO: 0 /100 WBC
PLATELET # BLD AUTO: 290 K/UL (ref 150–450)
PMV BLD AUTO: 8.8 FL (ref 9.2–12.9)
POCT GLUCOSE: 266 MG/DL (ref 70–110)
POTASSIUM SERPL-SCNC: 3.1 MMOL/L (ref 3.5–5.1)
RBC # BLD AUTO: 5.85 M/UL (ref 4.6–6.2)
SODIUM SERPL-SCNC: 145 MMOL/L (ref 136–145)
UUN UR-MCNC: 9 MG/DL (ref 2–20)
WBC # BLD AUTO: 5.97 K/UL (ref 3.9–12.7)

## 2023-07-12 PROCEDURE — 85025 COMPLETE CBC W/AUTO DIFF WBC: CPT | Mod: ER | Performed by: EMERGENCY MEDICINE

## 2023-07-12 PROCEDURE — 82962 GLUCOSE BLOOD TEST: CPT | Mod: ER

## 2023-07-12 PROCEDURE — 80048 BASIC METABOLIC PNL TOTAL CA: CPT | Mod: ER | Performed by: EMERGENCY MEDICINE

## 2023-07-12 PROCEDURE — 82077 ASSAY SPEC XCP UR&BREATH IA: CPT | Mod: ER | Performed by: EMERGENCY MEDICINE

## 2023-07-12 RX ORDER — POTASSIUM CHLORIDE 20 MEQ/1
40 TABLET, EXTENDED RELEASE ORAL
Status: DISCONTINUED | OUTPATIENT
Start: 2023-07-12 | End: 2023-07-12 | Stop reason: HOSPADM

## 2023-07-12 NOTE — ED PROVIDER NOTES
"ED Provider Note - 7/11/2023    History     Chief Complaint   Patient presents with    Altered Mental Status     Brought in by EMS, found down outside bar, strong ETOH and urine odor, beer cans in bag, drooling, slurring, responds to painful stimuli      Patient once again returns with concern regarding alcohol intoxication.  Patient was brought in by EMS after the patient was found sleeping on the ground surrounded by beer cans.  Patient reports he is dealing with some stress related to a relationship problem at present.  He denies any concern for suicidal ideation.  Patient is very lethargic limiting exam somewhat but does arouse with appropriate responses.      Review of patient's allergies indicates:  No Known Allergies  Past Medical History:   Diagnosis Date    Gastric ulcer     Hypertension      Past Surgical History:   Procedure Laterality Date    COLONOSCOPY  3/11/14     repeat in 3 years dr montgomery     Family History   Problem Relation Age of Onset    Heart disease Mother     Stomach cancer Father     Kidney failure Father     Cancer Father         leukemia     Social History     Tobacco Use    Smoking status: Every Day     Packs/day: 0.50     Types: Cigarettes    Smokeless tobacco: Never   Substance Use Topics    Alcohol use: Yes     Alcohol/week: 35.0 standard drinks     Types: 35 Cans of beer per week     Comment: Pt was vague and reported "celebrating too much maybe"    Drug use: Never     Review of Systems   Constitutional:  Negative for chills and fever.   HENT:  Negative for congestion and sore throat.    Respiratory:  Negative for chest tightness and shortness of breath.    Cardiovascular:  Negative for chest pain and palpitations.   Gastrointestinal:  Negative for abdominal pain and vomiting.   Genitourinary:  Negative for difficulty urinating and dysuria.   Skin:  Negative for color change and rash.   Neurological:  Negative for weakness and numbness.   All other systems reviewed and are " "negative.    Physical Exam     Initial Vitals [07/11/23 1950]   BP Pulse Resp Temp SpO2   (!) 149/70 70 10 97.8 °F (36.6 °C) (!) 94 %      MAP       --         Vitals:    07/11/23 1950 07/1951 07/11/23 2001 07/11/23 2101   BP: (!) 149/70 (!) 149/70 139/65 (!) 195/84   Pulse: 70 78 80 89   Resp: 10      Temp: 97.8 °F (36.6 °C)      SpO2: (!) 94% (!) 94% 97% 95%   Weight: 61.2 kg (135 lb)      Height: 5' 8" (1.727 m)       07/11/23 2113 07/11/23 2116 07/11/23 2131 07/11/23 2137   BP:       Pulse: 93 97 79 75   Resp:       Temp:       SpO2: 95% 96% 95% 95%   Weight:       Height:        07/11/23 2201 07/11/23 2204 07/11/23 2301 07/12/23 0001   BP: (!) 177/77  (!) 158/66 (!) 175/77   Pulse: 76 73 63 65   Resp:       Temp:       SpO2:  96% 95% 97%   Weight:       Height:        07/12/23 0300 07/12/23 0301 07/12/23 0552   BP:  (!) 174/79    Pulse: 63  (!) 58   Resp:      Temp:      SpO2: 96% 96% 97%   Weight:      Height:        Physical Exam    Nursing note and vitals reviewed.  Constitutional: He appears well-developed and well-nourished. He is not diaphoretic. No distress.   HENT:   Head: Normocephalic and atraumatic.   Nose: Nose normal.   Mouth/Throat: Oropharynx is clear and moist.   Eyes: Conjunctivae and lids are normal. Pupils are equal, round, and reactive to light. No scleral icterus. Right eye exhibits nystagmus (HGN). Left eye exhibits nystagmus (HGN).   Cardiovascular:  Normal rate, regular rhythm, normal heart sounds and intact distal pulses.           Pulmonary/Chest: Breath sounds normal. No respiratory distress.   Abdominal: Abdomen is soft. There is no abdominal tenderness.   Musculoskeletal:         General: No edema. Normal range of motion.     Neurological: He is alert and oriented to person, place, and time. He has normal strength. No cranial nerve deficit.   Slurred speech noted   Skin: Skin is warm and dry.       ED Course   Procedures                   MDM  Differential Diagnoses   Based on " available history, the working differential diagnoses considered during this evaluation include but are not limited to  alcohol intoxication, drug abuse, hypoglycemia, hypoxia, neurological impairment .      LABS     Labs Reviewed   ALCOHOL,MEDICAL (ETHANOL) - Abnormal; Notable for the following components:       Result Value    Alcohol, Serum 217 (*)     All other components within normal limits   BASIC METABOLIC PANEL - Abnormal; Notable for the following components:    Potassium 3.1 (*)     All other components within normal limits   CBC W/ AUTO DIFFERENTIAL - Abnormal; Notable for the following components:    MCV 80 (*)     MCH 25.8 (*)     RDW 18.2 (*)     MPV 8.8 (*)     All other components within normal limits   POCT GLUCOSE - Abnormal; Notable for the following components:    POCT Glucose 266 (*)     All other components within normal limits     All available results from the labs ordered were independently reviewed.  CBC unremarkable, Ethanol notable for elevated level of 217, and mild hypokalemia of 3.1 and BG of 266     Imaging     Imaging Results    None            EKG        ED Management/Discussion   Medications - No data to display                The patient's list of active medical problems, social history, medications, and allergies as documented per RN staff has been reviewed.                 Patient has once again been counseled regarding the dangers of ongoing alcohol abuse especially at the level he is currently participating.  Unfortunately the patient has no available friends or family in whom we can interest his care.  Accordingly the patient will be observed pending sobriety unless we are able to reach an emergency contact.    On final assessment, the patient appears well and comfortable for discharge.  I see no indication of an emergent process beyond that addressed during our encounter but have duly counseled the patient/family regarding the need for prompt follow-up as well as the  indications that should prompt immediate return to the emergency room should new or worrisome developments occur.  The patient/family has been provided with verbal and printed direction regarding our final diagnosis(es) as well as instructions regarding use of OTC and/or Rx medications intended to manage the patient's aforementioned conditions including:  ED Prescriptions    None           Patient has been advised of the following recommended follow-up instructions:  Follow-up Information       Follow up With Specialties Details Why Contact Info    Richard Meléndez MD Family Medicine Schedule an appointment as soon as possible for a visit  for reassessment [address] [place] [address] [phone]            The patient/family communicates understanding of all this information and all remaining questions and concerns were addressed at this time.      Referrals:  No orders of the defined types were placed in this encounter.      CLINICAL IMPRESSION    ICD-10-CM ICD-9-CM   1. Alcoholic intoxication without complication  F10.920 305.00          ED Disposition Condition    Discharge Stable               Jatin Davalos MD  07/12/23 2298

## 2023-07-12 NOTE — ED NOTES
Patient got up out of bed again and started urinating on the floor, so staff went in again and helped him with the urinal.

## 2023-07-12 NOTE — ED NOTES
"Blood drawn and sent to lab.  Pt awake and more cooperative at this time.  Pt speaking and answering questions.  Pt explained he has "broken up with his boyfriend and just having a bad day."  Pt asking for something to eat.   MD updated on request and pt's condition.  "

## 2023-07-12 NOTE — ED NOTES
Pt asleep on left side on ER stretcher.  Bed low and locked, SR up x 2.  RR= and not labored, NADN.

## 2023-07-12 NOTE — ED NOTES
Pt refused the administration of potassium ordered.  Attempted to give pt drink and sandwich as he had requested. Pt not wanting to wake and eat at this time.  PT laying on right side.   Bed low and locked.  SR up x 2.  Will continue to monitor

## 2023-07-12 NOTE — ED NOTES
Attempted to get in contact with patient's two listed emergency contacts, but one was disconnected and the other went straight to voicemail. Notified Dr. Davalos.

## 2023-07-13 DIAGNOSIS — I10 ESSENTIAL HYPERTENSION: ICD-10-CM

## 2023-07-13 DIAGNOSIS — D50.0 IRON DEFICIENCY ANEMIA DUE TO CHRONIC BLOOD LOSS: ICD-10-CM

## 2023-07-13 DIAGNOSIS — K25.4 CHRONIC GASTRIC ULCER WITH HEMORRHAGE: ICD-10-CM

## 2023-07-13 DIAGNOSIS — K21.9 GASTROESOPHAGEAL REFLUX DISEASE: ICD-10-CM

## 2023-07-13 DIAGNOSIS — R97.20 PSA ELEVATION: ICD-10-CM

## 2023-07-13 DIAGNOSIS — D50.9 MICROCYTIC ANEMIA: ICD-10-CM

## 2023-07-13 DIAGNOSIS — Z86.010 HISTORY OF COLON POLYPS: ICD-10-CM

## 2023-07-13 DIAGNOSIS — R63.4 WEIGHT LOSS: ICD-10-CM

## 2023-07-13 DIAGNOSIS — Z00.00 WELLNESS EXAMINATION: ICD-10-CM

## 2023-07-13 NOTE — TELEPHONE ENCOUNTER
No care due was identified.  [facility] Embedded Care Due Messages. Reference number: [id number].   7/13/2023 2:38:20 PM CDT

## 2023-07-15 RX ORDER — AMLODIPINE BESYLATE 10 MG/1
TABLET ORAL
Qty: 30 TABLET | Refills: 0 | OUTPATIENT
Start: 2023-07-15

## 2023-07-21 ENCOUNTER — TELEPHONE (OUTPATIENT)
Dept: FAMILY MEDICINE | Facility: CLINIC | Age: 73
End: 2023-07-21
Payer: MEDICARE

## 2023-07-26 ENCOUNTER — TELEPHONE (OUTPATIENT)
Dept: FAMILY MEDICINE | Facility: CLINIC | Age: 73
End: 2023-07-26
Payer: MEDICARE

## 2023-07-26 NOTE — TELEPHONE ENCOUNTER
Duplicate message. Spoke to patient.  ----------------------------------------------------------------------------    ----- Message from Miles Gonzalez sent at 7/26/2023 12:27 PM CDT -----  Contact: pt  Type: Requesting to speak with nurse        Who Called: PT  Regarding: pt speaking very low but requested to speak to office regarding his nursing home   Would the patient rather a call back or a response via [facility]? Call back  Best Call Back Number: [phone]  Additional Information:

## 2023-07-26 NOTE — TELEPHONE ENCOUNTER
Patient is requesting an appointment to speak to you regarding going to a nursing home. Patient stated he wants you to compete paperwork for him but also has questions for you.    Patient currently has a follow up scheduled with you on 08/23. Do you want to see patient before this appointment  --------------------------------------------------------------------------    ----- Message from Soniya Shannon sent at 7/26/2023 12:57 PM CDT -----  Type:  Needs Medical Advice    Who Called: pt    Would the patient rather a call back or a response via [facility]? call  Best Call Back Number: [phone]  Additional Information: pt is requesting to get a return call to discuss the completion of paperwork  that is  needed for him to go into a nursing home

## 2023-07-26 NOTE — TELEPHONE ENCOUNTER
----- Message from Soniya Shannon sent at 7/26/2023  1:00 PM CDT -----  Type:  RX Refill Request    Who Called: pt  Refill or New Rx:refill  RX Name and Strength:amLODIPine (NORVASC) 10 MG tablet  How is the patient currently taking it? (ex. 1XDay):Take 1 tablet (10 mg total) by mouth once daily. - Oral  Is this a 30 day or 90 day RX:30  Preferred Pharmacy with phone number:[id number] - [address] [address]  Phone: [phone] Fax: [phone]  Would the patient rather a call back or a response via [facility]? call  Best Call Back Number:[phone]  Additional Information:

## 2023-08-01 DIAGNOSIS — R63.4 WEIGHT LOSS: ICD-10-CM

## 2023-08-01 DIAGNOSIS — Z86.010 HISTORY OF COLON POLYPS: ICD-10-CM

## 2023-08-01 DIAGNOSIS — Z00.00 WELLNESS EXAMINATION: ICD-10-CM

## 2023-08-01 DIAGNOSIS — D50.0 IRON DEFICIENCY ANEMIA DUE TO CHRONIC BLOOD LOSS: ICD-10-CM

## 2023-08-01 DIAGNOSIS — R97.20 PSA ELEVATION: ICD-10-CM

## 2023-08-01 DIAGNOSIS — K21.9 GASTROESOPHAGEAL REFLUX DISEASE WITHOUT ESOPHAGITIS: ICD-10-CM

## 2023-08-01 DIAGNOSIS — I10 ESSENTIAL HYPERTENSION: ICD-10-CM

## 2023-08-01 DIAGNOSIS — D50.9 MICROCYTIC ANEMIA: ICD-10-CM

## 2023-08-01 DIAGNOSIS — K25.4 CHRONIC GASTRIC ULCER WITH HEMORRHAGE: ICD-10-CM

## 2023-08-01 NOTE — TELEPHONE ENCOUNTER
----- Message from Miles Gonzalez sent at 8/1/2023  9:24 AM CDT -----  Contact: PT  Type: Requesting REFILL        Who Called: PT  Regarding: BP AND ACID REFLUX (does not know the medical name)   Would the patient rather a call back or a response via [facility]? Call back to verify with pt please  Best Call Back Number: [phone]  pharmacy Information:  [id number] - [place] - [address] [place] [address]   Phone: [phone]  Fax:  [phone]

## 2023-08-01 NOTE — TELEPHONE ENCOUNTER
No care due was identified.  [facility] Embedded Care Due Messages. Reference number: [id number].   8/01/2023 9:33:45 AM CDT

## 2023-08-02 RX ORDER — PANTOPRAZOLE SODIUM 40 MG/1
40 TABLET, DELAYED RELEASE ORAL DAILY
Qty: 90 TABLET | Refills: 3 | Status: SHIPPED | OUTPATIENT
Start: 2023-08-02 | End: 2023-08-23 | Stop reason: SDUPTHER

## 2023-08-04 ENCOUNTER — TELEPHONE (OUTPATIENT)
Dept: FAMILY MEDICINE | Facility: CLINIC | Age: 73
End: 2023-08-04
Payer: MEDICARE

## 2023-08-04 NOTE — TELEPHONE ENCOUNTER
----- Message from Larisa Crews sent at 8/4/2023 11:41 AM CDT -----  Regarding: meds  Contact: Angel  Please call Angel at [facility].  Pt asking for a refill on Gabapentin and its a month early.  States the pills got wet.  Please call med [facility] at [phone]

## 2023-08-23 ENCOUNTER — OFFICE VISIT (OUTPATIENT)
Dept: FAMILY MEDICINE | Facility: CLINIC | Age: 73
End: 2023-08-23
Payer: MEDICARE

## 2023-08-23 VITALS
WEIGHT: 119.81 LBS | HEART RATE: 83 BPM | SYSTOLIC BLOOD PRESSURE: 164 MMHG | HEIGHT: 68 IN | DIASTOLIC BLOOD PRESSURE: 64 MMHG | BODY MASS INDEX: 18.16 KG/M2 | OXYGEN SATURATION: 95 % | TEMPERATURE: 98 F

## 2023-08-23 DIAGNOSIS — Z12.5 ENCOUNTER FOR SCREENING FOR MALIGNANT NEOPLASM OF PROSTATE: ICD-10-CM

## 2023-08-23 DIAGNOSIS — K25.4 CHRONIC GASTRIC ULCER WITH HEMORRHAGE: ICD-10-CM

## 2023-08-23 DIAGNOSIS — D50.9 MICROCYTIC ANEMIA: ICD-10-CM

## 2023-08-23 DIAGNOSIS — K21.9 GASTROESOPHAGEAL REFLUX DISEASE WITHOUT ESOPHAGITIS: ICD-10-CM

## 2023-08-23 DIAGNOSIS — F17.200 SMOKES: Primary | ICD-10-CM

## 2023-08-23 DIAGNOSIS — F10.20 ALCOHOL USE DISORDER, SEVERE, DEPENDENCE: ICD-10-CM

## 2023-08-23 DIAGNOSIS — Z00.00 WELLNESS EXAMINATION: ICD-10-CM

## 2023-08-23 DIAGNOSIS — R97.20 PSA ELEVATION: ICD-10-CM

## 2023-08-23 DIAGNOSIS — D50.0 IRON DEFICIENCY ANEMIA DUE TO CHRONIC BLOOD LOSS: ICD-10-CM

## 2023-08-23 DIAGNOSIS — K21.9 GASTROESOPHAGEAL REFLUX DISEASE: ICD-10-CM

## 2023-08-23 DIAGNOSIS — E74.819 DISORDERS OF GLUCOSE TRANSPORT, UNSPECIFIED: ICD-10-CM

## 2023-08-23 DIAGNOSIS — I10 ESSENTIAL HYPERTENSION: ICD-10-CM

## 2023-08-23 DIAGNOSIS — R63.4 WEIGHT LOSS: ICD-10-CM

## 2023-08-23 DIAGNOSIS — Z86.010 HISTORY OF COLON POLYPS: ICD-10-CM

## 2023-08-23 DIAGNOSIS — I10 PRIMARY HYPERTENSION: ICD-10-CM

## 2023-08-23 DIAGNOSIS — Z87.891 PERSONAL HISTORY OF NICOTINE DEPENDENCE: ICD-10-CM

## 2023-08-23 PROCEDURE — 1101F PT FALLS ASSESS-DOCD LE1/YR: CPT | Mod: CPTII,S$GLB,, | Performed by: FAMILY MEDICINE

## 2023-08-23 PROCEDURE — 1160F PR REVIEW ALL MEDS BY PRESCRIBER/CLIN PHARMACIST DOCUMENTED: ICD-10-PCS | Mod: CPTII,S$GLB,, | Performed by: FAMILY MEDICINE

## 2023-08-23 PROCEDURE — 3078F PR MOST RECENT DIASTOLIC BLOOD PRESSURE < 80 MM HG: ICD-10-PCS | Mod: CPTII,S$GLB,, | Performed by: FAMILY MEDICINE

## 2023-08-23 PROCEDURE — 1159F MED LIST DOCD IN RCRD: CPT | Mod: CPTII,S$GLB,, | Performed by: FAMILY MEDICINE

## 2023-08-23 PROCEDURE — 1126F AMNT PAIN NOTED NONE PRSNT: CPT | Mod: CPTII,S$GLB,, | Performed by: FAMILY MEDICINE

## 2023-08-23 PROCEDURE — 1160F RVW MEDS BY RX/DR IN RCRD: CPT | Mod: CPTII,S$GLB,, | Performed by: FAMILY MEDICINE

## 2023-08-23 PROCEDURE — 3288F FALL RISK ASSESSMENT DOCD: CPT | Mod: CPTII,S$GLB,, | Performed by: FAMILY MEDICINE

## 2023-08-23 PROCEDURE — 99214 PR OFFICE/OUTPT VISIT, EST, LEVL IV, 30-39 MIN: ICD-10-PCS | Mod: S$GLB,,, | Performed by: FAMILY MEDICINE

## 2023-08-23 PROCEDURE — 3077F SYST BP >= 140 MM HG: CPT | Mod: CPTII,S$GLB,, | Performed by: FAMILY MEDICINE

## 2023-08-23 PROCEDURE — 1101F PR PT FALLS ASSESS DOC 0-1 FALLS W/OUT INJ PAST YR: ICD-10-PCS | Mod: CPTII,S$GLB,, | Performed by: FAMILY MEDICINE

## 2023-08-23 PROCEDURE — 3008F PR BODY MASS INDEX (BMI) DOCUMENTED: ICD-10-PCS | Mod: CPTII,S$GLB,, | Performed by: FAMILY MEDICINE

## 2023-08-23 PROCEDURE — 3288F PR FALLS RISK ASSESSMENT DOCUMENTED: ICD-10-PCS | Mod: CPTII,S$GLB,, | Performed by: FAMILY MEDICINE

## 2023-08-23 PROCEDURE — 3008F BODY MASS INDEX DOCD: CPT | Mod: CPTII,S$GLB,, | Performed by: FAMILY MEDICINE

## 2023-08-23 PROCEDURE — 99214 OFFICE O/P EST MOD 30 MIN: CPT | Mod: S$GLB,,, | Performed by: FAMILY MEDICINE

## 2023-08-23 PROCEDURE — 1126F PR PAIN SEVERITY QUANTIFIED, NO PAIN PRESENT: ICD-10-PCS | Mod: CPTII,S$GLB,, | Performed by: FAMILY MEDICINE

## 2023-08-23 PROCEDURE — 3078F DIAST BP <80 MM HG: CPT | Mod: CPTII,S$GLB,, | Performed by: FAMILY MEDICINE

## 2023-08-23 PROCEDURE — 3077F PR MOST RECENT SYSTOLIC BLOOD PRESSURE >= 140 MM HG: ICD-10-PCS | Mod: CPTII,S$GLB,, | Performed by: FAMILY MEDICINE

## 2023-08-23 PROCEDURE — 1159F PR MEDICATION LIST DOCUMENTED IN MEDICAL RECORD: ICD-10-PCS | Mod: CPTII,S$GLB,, | Performed by: FAMILY MEDICINE

## 2023-08-23 RX ORDER — PANTOPRAZOLE SODIUM 40 MG/1
40 TABLET, DELAYED RELEASE ORAL DAILY
Qty: 90 TABLET | Refills: 3 | Status: SHIPPED | OUTPATIENT
Start: 2023-08-23

## 2023-08-23 RX ORDER — AMLODIPINE BESYLATE 10 MG/1
TABLET ORAL
Qty: 90 TABLET | Refills: 3 | Status: SHIPPED | OUTPATIENT
Start: 2023-08-23

## 2023-08-23 RX ORDER — GABAPENTIN 300 MG/1
300 CAPSULE ORAL 2 TIMES DAILY
Qty: 200 CAPSULE | Refills: 11 | Status: SHIPPED | OUTPATIENT
Start: 2023-08-23 | End: 2024-08-22

## 2023-08-23 RX ORDER — DIPHENHYDRAMINE HCL 25 MG
4 CAPSULE ORAL
Qty: 200 EACH | Refills: 11 | Status: SHIPPED | OUTPATIENT
Start: 2023-08-23 | End: 2023-09-07 | Stop reason: SDUPTHER

## 2023-08-23 RX ORDER — METOPROLOL TARTRATE 50 MG/1
50 TABLET ORAL 2 TIMES DAILY
Qty: 180 TABLET | Refills: 3 | Status: SHIPPED | OUTPATIENT
Start: 2023-08-23 | End: 2023-09-07 | Stop reason: SDUPTHER

## 2023-08-23 NOTE — PROGRESS NOTES
"Subjective:      Patient ID: Gama Huertas is a 72 y.o. male.    Chief Complaint: Follow-up (6 month)      Vitals:    08/23/23 1541 08/23/23 1630   BP: (!) 170/50 (!) 164/64   Pulse: 83    Temp: 98 °F (36.7 °C)    SpO2: 95%    Weight: 54.3 kg (119 lb 13.1 oz)    Height: 5' 8" (1.727 m)         HPI   Here3 for check up; here with person he lives with temporary, who cares for him and wants him to straightren up   Wants to quit smoking and wants gum  Alcoholic   Problem List  Patient Active Problem List   Diagnosis    Microcytic anemia    Hypertension    Anxiety    Gastric ulcer    History of colon polyps    PSA elevation    Other nonthrombocytopenic purpura    Dementia without behavioral disturbance    Smokes    Alcohol abuse    Adjustment disorder with mixed anxiety and depressed mood    Alcohol use disorder, severe, dependence    Alcohol withdrawal syndrome without complication        ALLERGIES: Review of patient's allergies indicates:  No Known Allergies    MEDS:   Current Outpatient Medications:     EScitalopram oxalate (LEXAPRO) 5 MG Tab, Take 1 tablet (5 mg total) by mouth once daily., Disp: 100 tablet, Rfl: 11    FEROSUL 325 mg (65 mg iron) Tab tablet, TAKE 1 TABLET BY MOUTH ONCE A DAY, Disp: 90 tablet, Rfl: 3    folic acid (FOLVITE) 1 MG tablet, Take 1 tablet (1 mg total) by mouth once daily., Disp: 100 tablet, Rfl: 11    amLODIPine (NORVASC) 10 MG tablet, TAKE 1 TABLET BY MOUTH ONCE DAY, Disp: 90 tablet, Rfl: 3    gabapentin (NEURONTIN) 300 MG capsule, Take 1 capsule (300 mg total) by mouth 2 (two) times daily., Disp: 200 capsule, Rfl: 11    metoprolol tartrate (LOPRESSOR) 50 MG tablet, Take 1 tablet (50 mg total) by mouth 2 (two) times daily., Disp: 180 tablet, Rfl: 3    nicotine polacrilex (NICORETTE) 4 MG Gum, Take 1 each (4 mg total) by mouth as needed., Disp: 200 each, Rfl: 11    pantoprazole (PROTONIX) 40 MG tablet, Take 1 tablet (40 mg total) by mouth once daily., Disp: 90 tablet, Rfl: " "3      History:  Current Providers as of 8/23/2023  PCP: Richard Meléndez MD  Care Team Provider: Les Wakefield Jr., MD  Care Team Provider: Anil Lester LPN  Care Team Provider: Vince Vu LPN  Care Team Provider: Marielena Green LPN  Encounter Provider: Richard Meléndez MD, starting on Wed Aug 23, 2023 12:00 AM  Referring Provider: not found, starting on Wed Aug 23, 2023 12:00 AM  Consulting Physician: Richard Meléndez MD, starting on Wed Aug 23, 2023  3:35 PM (Active)   Past Medical History:   Diagnosis Date    Gastric ulcer     Hypertension      Past Surgical History:   Procedure Laterality Date    COLONOSCOPY  3/11/14     repeat in 3 years dr wakefield     Social History     Tobacco Use    Smoking status: Every Day     Current packs/day: 0.50     Types: Cigarettes     Passive exposure: Current    Smokeless tobacco: Never   Substance Use Topics    Alcohol use: Yes     Alcohol/week: 35.0 standard drinks of alcohol     Types: 35 Cans of beer per week     Comment: Pt was vague and reported "celebrating too much maybe"    Drug use: Never         Review of Systems   Constitutional:  Negative for appetite change, fatigue, fever and unexpected weight change.   HENT:  Negative for congestion, ear pain, sinus pressure and sore throat.    Eyes:  Negative for pain and visual disturbance.   Respiratory:  Negative for shortness of breath.    Cardiovascular:  Negative for chest pain.   Gastrointestinal:  Negative for abdominal pain, constipation and diarrhea.   Endocrine: Negative for polyuria.   Genitourinary:  Negative for difficulty urinating and frequency.   Musculoskeletal:  Negative for arthralgias, back pain and myalgias.   Skin:  Negative for color change.   Allergic/Immunologic: Negative.    Neurological:  Negative for syncope, weakness and headaches.   Hematological:  Does not bruise/bleed easily.   Psychiatric/Behavioral:  Negative for dysphoric mood and suicidal ideas. The patient is not " nervous/anxious.    All other systems reviewed and are negative.    Objective:     Physical Exam  Vitals and nursing note reviewed.   Constitutional:       General: He is not in acute distress.     Appearance: He is well-developed. He is not diaphoretic.   HENT:      Head: Normocephalic and atraumatic.      Right Ear: External ear normal.      Left Ear: External ear normal.      Mouth/Throat:      Pharynx: No oropharyngeal exudate.   Eyes:      General: No scleral icterus.        Right eye: No discharge.         Left eye: No discharge.      Conjunctiva/sclera: Conjunctivae normal.      Pupils: Pupils are equal, round, and reactive to light.   Neck:      Thyroid: No thyromegaly.      Vascular: No JVD.      Trachea: No tracheal deviation.   Cardiovascular:      Rate and Rhythm: Normal rate and regular rhythm.      Heart sounds: No murmur heard.     No friction rub. No gallop.   Pulmonary:      Effort: Pulmonary effort is normal. No respiratory distress.      Breath sounds: Normal breath sounds. No stridor. No wheezing or rales.   Chest:      Chest wall: No tenderness.   Abdominal:      General: There is no distension.      Palpations: Abdomen is soft. There is no mass.      Tenderness: There is no abdominal tenderness. There is no guarding or rebound.   Musculoskeletal:         General: No tenderness. Normal range of motion.      Cervical back: Normal range of motion and neck supple.   Lymphadenopathy:      Cervical: No cervical adenopathy.   Skin:     General: Skin is warm and dry.      Coloration: Skin is not pale.      Findings: No erythema or rash.   Neurological:      Mental Status: He is alert and oriented to person, place, and time.      Cranial Nerves: No cranial nerve deficit.      Motor: No abnormal muscle tone.      Coordination: Coordination normal.      Deep Tendon Reflexes: Reflexes are normal and symmetric. Reflexes normal.   Psychiatric:         Mood and Affect: Mood normal.         Behavior: Behavior  normal.         Thought Content: Thought content normal.         Judgment: Judgment normal.             Assessment:     1. Smokes    2. Microcytic anemia    3. Primary hypertension    4. Alcohol use disorder, severe, dependence    5. Essential hypertension    6. Gastroesophageal reflux disease    7. Weight loss    8. PSA elevation    9. Chronic gastric ulcer with hemorrhage    10. History of colon polyps    11. Iron deficiency anemia due to chronic blood loss    12. Wellness examination    13. Gastroesophageal reflux disease without esophagitis    14. Encounter for screening for malignant neoplasm of prostate    15. Personal history of nicotine dependence    16. Disorders of glucose transport, unspecified      Plan:        Medication List            Accurate as of August 23, 2023 11:59 PM. If you have any questions, ask your nurse or doctor.                START taking these medications      nicotine polacrilex 4 MG Gum  Commonly known as: NICORETTE  Take 1 each (4 mg total) by mouth as needed.  Started by: Richard Meléndez MD            CONTINUE taking these medications      amLODIPine 10 MG tablet  Commonly known as: NORVASC  TAKE 1 TABLET BY MOUTH ONCE DAY     EScitalopram oxalate 5 MG Tab  Commonly known as: LEXAPRO  Take 1 tablet (5 mg total) by mouth once daily.     FeroSuL 325 mg (65 mg iron) Tab tablet  Generic drug: ferrous sulfate  TAKE 1 TABLET BY MOUTH ONCE A DAY     folic acid 1 MG tablet  Commonly known as: FOLVITE  Take 1 tablet (1 mg total) by mouth once daily.     gabapentin 300 MG capsule  Commonly known as: NEURONTIN  Take 1 capsule (300 mg total) by mouth 2 (two) times daily.     metoprolol tartrate 50 MG tablet  Commonly known as: LOPRESSOR  Take 1 tablet (50 mg total) by mouth 2 (two) times daily.     pantoprazole 40 MG tablet  Commonly known as: PROTONIX  Take 1 tablet (40 mg total) by mouth once daily.               Where to Get Your Medications        These medications [id number] - [address] [facility]  [address] [place], [place] [address] [id number]      Phone: [phone]   amLODIPine 10 MG tablet  gabapentin 300 MG capsule  nicotine polacrilex 4 MG Gum  pantoprazole 40 MG tablet       Information about where to get these medications is not yet available    Ask your nurse or doctor about these medications  metoprolol tartrate 50 MG tablet       Smokes  -     CT Chest Lung Screening Low Dose; Future; Expected date: 08/23/2023  -     CBC Auto Differential; Future; Expected date: 08/23/2023  -     Comprehensive Metabolic Panel; Future; Expected date: 08/23/2023  -     Hemoglobin A1C; Future  -     Lipid Panel; Future  -     Microalbumin/Creatinine Ratio, Urine; Future  -     PSA, Screening; Future  -     TSH; Future  -     Urinalysis; Future    Microcytic anemia  -     CT Chest Lung Screening Low Dose; Future; Expected date: 08/23/2023  -     amLODIPine (NORVASC) 10 MG tablet; TAKE 1 TABLET BY MOUTH ONCE DAY  Dispense: 90 tablet; Refill: 3  -     metoprolol tartrate (LOPRESSOR) 50 MG tablet; Take 1 tablet (50 mg total) by mouth 2 (two) times daily.  Dispense: 180 tablet; Refill: 3  -     pantoprazole (PROTONIX) 40 MG tablet; Take 1 tablet (40 mg total) by mouth once daily.  Dispense: 90 tablet; Refill: 3  -     CBC Auto Differential; Future; Expected date: 08/23/2023  -     Comprehensive Metabolic Panel; Future; Expected date: 08/23/2023  -     Hemoglobin A1C; Future  -     Lipid Panel; Future  -     Microalbumin/Creatinine Ratio, Urine; Future  -     PSA, Screening; Future  -     TSH; Future  -     Urinalysis; Future    Primary hypertension  -     CT Chest Lung Screening Low Dose; Future; Expected date: 08/23/2023  -     CBC Auto Differential; Future; Expected date: 08/23/2023  -     Comprehensive Metabolic Panel; Future; Expected date: 08/23/2023  -     Hemoglobin A1C; Future  -     Lipid Panel; Future  -     Microalbumin/Creatinine Ratio, Urine; Future  -     PSA,  Screening; Future  -     TSH; Future  -     Urinalysis; Future    Alcohol use disorder, severe, dependence  -     CT Chest Lung Screening Low Dose; Future; Expected date: 08/23/2023  -     CBC Auto Differential; Future; Expected date: 08/23/2023  -     Comprehensive Metabolic Panel; Future; Expected date: 08/23/2023  -     Hemoglobin A1C; Future  -     Lipid Panel; Future  -     Microalbumin/Creatinine Ratio, Urine; Future  -     PSA, Screening; Future  -     TSH; Future  -     Urinalysis; Future    Essential hypertension  -     CT Chest Lung Screening Low Dose; Future; Expected date: 08/23/2023  -     amLODIPine (NORVASC) 10 MG tablet; TAKE 1 TABLET BY MOUTH ONCE DAY  Dispense: 90 tablet; Refill: 3  -     metoprolol tartrate (LOPRESSOR) 50 MG tablet; Take 1 tablet (50 mg total) by mouth 2 (two) times daily.  Dispense: 180 tablet; Refill: 3  -     pantoprazole (PROTONIX) 40 MG tablet; Take 1 tablet (40 mg total) by mouth once daily.  Dispense: 90 tablet; Refill: 3  -     CBC Auto Differential; Future; Expected date: 08/23/2023  -     Comprehensive Metabolic Panel; Future; Expected date: 08/23/2023  -     Hemoglobin A1C; Future  -     Lipid Panel; Future  -     Microalbumin/Creatinine Ratio, Urine; Future  -     PSA, Screening; Future  -     TSH; Future  -     Urinalysis; Future    Gastroesophageal reflux disease  -     CT Chest Lung Screening Low Dose; Future; Expected date: 08/23/2023  -     amLODIPine (NORVASC) 10 MG tablet; TAKE 1 TABLET BY MOUTH ONCE DAY  Dispense: 90 tablet; Refill: 3  -     metoprolol tartrate (LOPRESSOR) 50 MG tablet; Take 1 tablet (50 mg total) by mouth 2 (two) times daily.  Dispense: 180 tablet; Refill: 3  -     CBC Auto Differential; Future; Expected date: 08/23/2023  -     Comprehensive Metabolic Panel; Future; Expected date: 08/23/2023  -     Hemoglobin A1C; Future  -     Lipid Panel; Future  -     Microalbumin/Creatinine Ratio, Urine; Future  -     PSA, Screening; Future  -     TSH;  Future  -     Urinalysis; Future    Weight loss  -     CT Chest Lung Screening Low Dose; Future; Expected date: 08/23/2023  -     amLODIPine (NORVASC) 10 MG tablet; TAKE 1 TABLET BY MOUTH ONCE DAY  Dispense: 90 tablet; Refill: 3  -     metoprolol tartrate (LOPRESSOR) 50 MG tablet; Take 1 tablet (50 mg total) by mouth 2 (two) times daily.  Dispense: 180 tablet; Refill: 3  -     pantoprazole (PROTONIX) 40 MG tablet; Take 1 tablet (40 mg total) by mouth once daily.  Dispense: 90 tablet; Refill: 3  -     CBC Auto Differential; Future; Expected date: 08/23/2023  -     Comprehensive Metabolic Panel; Future; Expected date: 08/23/2023  -     Hemoglobin A1C; Future  -     Lipid Panel; Future  -     Microalbumin/Creatinine Ratio, Urine; Future  -     PSA, Screening; Future  -     TSH; Future  -     Urinalysis; Future    PSA elevation  -     CT Chest Lung Screening Low Dose; Future; Expected date: 08/23/2023  -     amLODIPine (NORVASC) 10 MG tablet; TAKE 1 TABLET BY MOUTH ONCE DAY  Dispense: 90 tablet; Refill: 3  -     metoprolol tartrate (LOPRESSOR) 50 MG tablet; Take 1 tablet (50 mg total) by mouth 2 (two) times daily.  Dispense: 180 tablet; Refill: 3  -     pantoprazole (PROTONIX) 40 MG tablet; Take 1 tablet (40 mg total) by mouth once daily.  Dispense: 90 tablet; Refill: 3  -     CBC Auto Differential; Future; Expected date: 08/23/2023  -     Comprehensive Metabolic Panel; Future; Expected date: 08/23/2023  -     Hemoglobin A1C; Future  -     Lipid Panel; Future  -     Microalbumin/Creatinine Ratio, Urine; Future  -     PSA, Screening; Future  -     TSH; Future  -     Urinalysis; Future    Chronic gastric ulcer with hemorrhage  -     CT Chest Lung Screening Low Dose; Future; Expected date: 08/23/2023  -     amLODIPine (NORVASC) 10 MG tablet; TAKE 1 TABLET BY MOUTH ONCE DAY  Dispense: 90 tablet; Refill: 3  -     metoprolol tartrate (LOPRESSOR) 50 MG tablet; Take 1 tablet (50 mg total) by mouth 2 (two) times daily.   Dispense: 180 tablet; Refill: 3  -     pantoprazole (PROTONIX) 40 MG tablet; Take 1 tablet (40 mg total) by mouth once daily.  Dispense: 90 tablet; Refill: 3  -     CBC Auto Differential; Future; Expected date: 08/23/2023  -     Comprehensive Metabolic Panel; Future; Expected date: 08/23/2023  -     Hemoglobin A1C; Future  -     Lipid Panel; Future  -     Microalbumin/Creatinine Ratio, Urine; Future  -     PSA, Screening; Future  -     TSH; Future  -     Urinalysis; Future    History of colon polyps  -     CT Chest Lung Screening Low Dose; Future; Expected date: 08/23/2023  -     amLODIPine (NORVASC) 10 MG tablet; TAKE 1 TABLET BY MOUTH ONCE DAY  Dispense: 90 tablet; Refill: 3  -     metoprolol tartrate (LOPRESSOR) 50 MG tablet; Take 1 tablet (50 mg total) by mouth 2 (two) times daily.  Dispense: 180 tablet; Refill: 3  -     pantoprazole (PROTONIX) 40 MG tablet; Take 1 tablet (40 mg total) by mouth once daily.  Dispense: 90 tablet; Refill: 3  -     CBC Auto Differential; Future; Expected date: 08/23/2023  -     Comprehensive Metabolic Panel; Future; Expected date: 08/23/2023  -     Hemoglobin A1C; Future  -     Lipid Panel; Future  -     Microalbumin/Creatinine Ratio, Urine; Future  -     PSA, Screening; Future  -     TSH; Future  -     Urinalysis; Future    Iron deficiency anemia due to chronic blood loss  -     CT Chest Lung Screening Low Dose; Future; Expected date: 08/23/2023  -     amLODIPine (NORVASC) 10 MG tablet; TAKE 1 TABLET BY MOUTH ONCE DAY  Dispense: 90 tablet; Refill: 3  -     metoprolol tartrate (LOPRESSOR) 50 MG tablet; Take 1 tablet (50 mg total) by mouth 2 (two) times daily.  Dispense: 180 tablet; Refill: 3  -     pantoprazole (PROTONIX) 40 MG tablet; Take 1 tablet (40 mg total) by mouth once daily.  Dispense: 90 tablet; Refill: 3  -     CBC Auto Differential; Future; Expected date: 08/23/2023  -     Comprehensive Metabolic Panel; Future; Expected date: 08/23/2023  -     Hemoglobin A1C; Future  -      Lipid Panel; Future  -     Microalbumin/Creatinine Ratio, Urine; Future  -     PSA, Screening; Future  -     TSH; Future  -     Urinalysis; Future    Wellness examination  -     CT Chest Lung Screening Low Dose; Future; Expected date: 08/23/2023  -     amLODIPine (NORVASC) 10 MG tablet; TAKE 1 TABLET BY MOUTH ONCE DAY  Dispense: 90 tablet; Refill: 3  -     metoprolol tartrate (LOPRESSOR) 50 MG tablet; Take 1 tablet (50 mg total) by mouth 2 (two) times daily.  Dispense: 180 tablet; Refill: 3  -     pantoprazole (PROTONIX) 40 MG tablet; Take 1 tablet (40 mg total) by mouth once daily.  Dispense: 90 tablet; Refill: 3  -     CBC Auto Differential; Future; Expected date: 08/23/2023  -     Comprehensive Metabolic Panel; Future; Expected date: 08/23/2023  -     Hemoglobin A1C; Future  -     Lipid Panel; Future  -     Microalbumin/Creatinine Ratio, Urine; Future  -     PSA, Screening; Future  -     TSH; Future  -     Urinalysis; Future    Gastroesophageal reflux disease without esophagitis  -     CT Chest Lung Screening Low Dose; Future; Expected date: 08/23/2023  -     pantoprazole (PROTONIX) 40 MG tablet; Take 1 tablet (40 mg total) by mouth once daily.  Dispense: 90 tablet; Refill: 3  -     CBC Auto Differential; Future; Expected date: 08/23/2023  -     Comprehensive Metabolic Panel; Future; Expected date: 08/23/2023  -     Hemoglobin A1C; Future  -     Lipid Panel; Future  -     Microalbumin/Creatinine Ratio, Urine; Future  -     PSA, Screening; Future  -     TSH; Future  -     Urinalysis; Future    Encounter for screening for malignant neoplasm of prostate  -     PSA, Screening; Future    Personal history of nicotine dependence  -     CT Chest Lung Screening Low Dose; Future; Expected date: 08/23/2023    Disorders of glucose transport, unspecified  -     Hemoglobin A1C; Future    Other orders  -     nicotine polacrilex (NICORETTE) 4 MG Gum; Take 1 each (4 mg total) by mouth as needed.  Dispense: 200 each; Refill:  11  -     gabapentin (NEURONTIN) 300 MG capsule; Take 1 capsule (300 mg total) by mouth 2 (two) times daily.  Dispense: 200 capsule; Refill: 11

## 2023-08-24 ENCOUNTER — PATIENT OUTREACH (OUTPATIENT)
Dept: ADMINISTRATIVE | Facility: OTHER | Age: 73
End: 2023-08-24
Payer: MEDICARE

## 2023-08-24 NOTE — PROGRESS NOTES
CHW - Initial Contact    This Community Health Worker completed the Social Determinant of Health questionnaire with MRN [id number] in person today.    Pt identified barriers of most importance are: Housing Instability   Referrals to community agencies completed with patient/caregiver consent outside of [facility] include: Yes  Referrals were put through [facility] - No  Support and Services: [facility]     Other information discussed the patient needs / wants help with: SDOH complete. I spoke to patient during his clinic appointment, I have previously been in contact with Patient's family regarding assisting patient with housing and mental health services.    Patient is no longer living on his own, appears to alternate between living with family, friends, and on the street. Patient appears to be mentally stable and did not appear in distress. Patient is living with a friend at this time who also brings patient to all appointments.     Patient requested, and was provided flyers with contact information for the [facility] locations in [facility], [facility], and [place]. Patient was staying [facility] which is also under [facility] but was evicted. Patient was encouraged to contact the locations provided for additional information and availability.    Patient was also provided my contact information if additional assistance is needed in the future.     Follow up required: Not at this time  No future outreach task assigned

## 2023-08-28 ENCOUNTER — HOSPITAL ENCOUNTER (OUTPATIENT)
Dept: RADIOLOGY | Facility: HOSPITAL | Age: 73
Discharge: HOME | End: 2023-08-28
Attending: FAMILY MEDICINE
Payer: MEDICARE

## 2023-08-28 DIAGNOSIS — D50.0 IRON DEFICIENCY ANEMIA DUE TO CHRONIC BLOOD LOSS: ICD-10-CM

## 2023-08-28 DIAGNOSIS — R97.20 PSA ELEVATION: ICD-10-CM

## 2023-08-28 DIAGNOSIS — Z87.891 PERSONAL HISTORY OF NICOTINE DEPENDENCE: ICD-10-CM

## 2023-08-28 DIAGNOSIS — Z86.010 HISTORY OF COLON POLYPS: ICD-10-CM

## 2023-08-28 DIAGNOSIS — K21.9 GASTROESOPHAGEAL REFLUX DISEASE WITHOUT ESOPHAGITIS: ICD-10-CM

## 2023-08-28 DIAGNOSIS — F10.20 ALCOHOL USE DISORDER, SEVERE, DEPENDENCE: ICD-10-CM

## 2023-08-28 DIAGNOSIS — I10 PRIMARY HYPERTENSION: ICD-10-CM

## 2023-08-28 DIAGNOSIS — I10 ESSENTIAL HYPERTENSION: ICD-10-CM

## 2023-08-28 DIAGNOSIS — D50.9 MICROCYTIC ANEMIA: ICD-10-CM

## 2023-08-28 DIAGNOSIS — R63.4 WEIGHT LOSS: ICD-10-CM

## 2023-08-28 DIAGNOSIS — F17.200 SMOKES: ICD-10-CM

## 2023-08-28 DIAGNOSIS — Z00.00 WELLNESS EXAMINATION: ICD-10-CM

## 2023-08-28 DIAGNOSIS — K21.9 GASTROESOPHAGEAL REFLUX DISEASE: ICD-10-CM

## 2023-08-28 DIAGNOSIS — K25.4 CHRONIC GASTRIC ULCER WITH HEMORRHAGE: ICD-10-CM

## 2023-08-28 PROCEDURE — 71271 CT THORAX LUNG CANCER SCR C-: CPT | Mod: TC,PO

## 2023-08-28 PROCEDURE — 71271 CT CHEST LUNG SCREENING LOW DOSE: ICD-10-PCS | Mod: 26,,, | Performed by: RADIOLOGY

## 2023-08-28 PROCEDURE — 71271 CT THORAX LUNG CANCER SCR C-: CPT | Mod: 26,,, | Performed by: RADIOLOGY

## 2023-09-07 DIAGNOSIS — Z00.00 WELLNESS EXAMINATION: ICD-10-CM

## 2023-09-07 DIAGNOSIS — Z86.010 HISTORY OF COLON POLYPS: ICD-10-CM

## 2023-09-07 DIAGNOSIS — I10 ESSENTIAL HYPERTENSION: ICD-10-CM

## 2023-09-07 DIAGNOSIS — D50.9 MICROCYTIC ANEMIA: ICD-10-CM

## 2023-09-07 DIAGNOSIS — K21.9 GASTROESOPHAGEAL REFLUX DISEASE: ICD-10-CM

## 2023-09-07 DIAGNOSIS — K25.4 CHRONIC GASTRIC ULCER WITH HEMORRHAGE: ICD-10-CM

## 2023-09-07 DIAGNOSIS — R63.4 WEIGHT LOSS: ICD-10-CM

## 2023-09-07 DIAGNOSIS — D50.0 IRON DEFICIENCY ANEMIA DUE TO CHRONIC BLOOD LOSS: ICD-10-CM

## 2023-09-07 DIAGNOSIS — R97.20 PSA ELEVATION: ICD-10-CM

## 2023-09-07 NOTE — TELEPHONE ENCOUNTER
----- Message from Soniya Shannon sent at 9/7/2023 11:49 AM CDT -----  Type:  RX Refill Request    Who Called: pt   Refill or New Rx:refill  RX Name and Strength:metoprolol tartrate (LOPRESSOR) 50 MG tablet  How is the patient currently taking it? (ex. 1XDay):Take 1 tablet (50 mg total) by mouth 2 (two) times daily. - Oral and nicotine polacrilex (NICORETTE) 4 MG Gum  Is this a 30 day or 90 day RX:180  Preferred Pharmacy with phone number:[id number] [address] [address]  Phone: [phone] Fax: [phone]  Would the patient rather a call back or a response via [facility]? call  Best Call Back Number:[phone]  Additional Information:

## 2023-09-07 NOTE — TELEPHONE ENCOUNTER
No care due was identified.  [facility] Embedded Care Due Messages. Reference number: [id number].   9/07/2023 11:56:48 AM CDT

## 2023-09-08 RX ORDER — DIPHENHYDRAMINE HCL 25 MG
4 CAPSULE ORAL
Qty: 200 EACH | Refills: 11 | Status: SHIPPED | OUTPATIENT
Start: 2023-09-08

## 2023-09-08 RX ORDER — METOPROLOL TARTRATE 50 MG/1
50 TABLET ORAL 2 TIMES DAILY
Qty: 180 TABLET | Refills: 3 | Status: SHIPPED | OUTPATIENT
Start: 2023-09-08

## 2023-09-27 ENCOUNTER — PATIENT OUTREACH (OUTPATIENT)
Dept: ADMINISTRATIVE | Facility: OTHER | Age: 73
End: 2023-09-27
Payer: MEDICARE

## 2023-09-27 NOTE — PROGRESS NOTES
CHW - Case Closure    This Community Health Worker spoke to MRN [id number] over the phone today.   Pt/Caregiver reported: Spoke to patient regarding update on housing stability. Patient has not heard any updates regarding [facility] facility but states he stably housed at this time. Patient is still living with his friend who he was with during his last clinic appointment, states he will be able to stay there until he is able to locate his own permeant housing. Patient was encouraged to contact me if any assistance is needed in the future, patient agreed.   Pt/Caregiver denied any additional needs at this time and agrees with episode closure at this time.  Provided MRN Russell with Community Health Worker's contact information and encouraged him/her to contact this Community Health Worker if additional needs arise.

## 2023-12-22 ENCOUNTER — TELEPHONE (OUTPATIENT)
Dept: GASTROENTEROLOGY | Facility: CLINIC | Age: 73
End: 2023-12-22
Payer: MEDICARE
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
## 2024-01-01 NOTE — TELEPHONE ENCOUNTER
----- Message from Jany Hitchcock sent at 5/29/2023  3:30 PM CDT -----  Regarding: new medications  Contact: [phone]  Patient is requesting a call back regarding having new medications that he needs refills on.   Would the patient rather a call back or a response via [facility]?  Call   Best Call Back Number:  [phone]  Additional Information:  he is out of one already and needs to speak with someone in the office            
Attempted pt. Unable to LVM.   
Pt called in. He explained that he is in need of medications to be refilled.    Lexapro 5mg tablet ( not on med list )  2.  folic acid tablet 1 mg    3.  gabapation 300 mg cap. ( Not on med list)     Pt has an appt scheduled on 08/23/23 as he was here in Feb and you wanted to see him on 6 months.  
auscultated

## 2024-05-09 ENCOUNTER — LAB VISIT (OUTPATIENT)
Dept: LAB | Facility: HOSPITAL | Age: 74
End: 2024-05-09
Attending: PHYSICIAN ASSISTANT
Payer: MEDICARE

## 2024-05-09 ENCOUNTER — OFFICE VISIT (OUTPATIENT)
Dept: FAMILY MEDICINE | Facility: CLINIC | Age: 74
End: 2024-05-09
Payer: MEDICARE

## 2024-05-09 VITALS
TEMPERATURE: 98 F | SYSTOLIC BLOOD PRESSURE: 128 MMHG | BODY MASS INDEX: 18.27 KG/M2 | DIASTOLIC BLOOD PRESSURE: 64 MMHG | OXYGEN SATURATION: 95 % | WEIGHT: 120.56 LBS | HEART RATE: 72 BPM | HEIGHT: 68 IN

## 2024-05-09 DIAGNOSIS — Z91.199 HISTORY OF NONCOMPLIANCE WITH MEDICAL TREATMENT, PRESENTING HAZARDS TO HEALTH: ICD-10-CM

## 2024-05-09 DIAGNOSIS — F03.90 DEMENTIA WITHOUT BEHAVIORAL DISTURBANCE: ICD-10-CM

## 2024-05-09 DIAGNOSIS — E74.819 DISORDERS OF GLUCOSE TRANSPORT, UNSPECIFIED: ICD-10-CM

## 2024-05-09 DIAGNOSIS — F10.20 ALCOHOL USE DISORDER, SEVERE, DEPENDENCE: ICD-10-CM

## 2024-05-09 DIAGNOSIS — D69.2 OTHER NONTHROMBOCYTOPENIC PURPURA: ICD-10-CM

## 2024-05-09 DIAGNOSIS — R55 SYNCOPE, UNSPECIFIED SYNCOPE TYPE: ICD-10-CM

## 2024-05-09 DIAGNOSIS — J43.9 PULMONARY EMPHYSEMA, UNSPECIFIED EMPHYSEMA TYPE: ICD-10-CM

## 2024-05-09 DIAGNOSIS — I70.0 AORTIC ATHEROSCLEROSIS: ICD-10-CM

## 2024-05-09 DIAGNOSIS — Z72.0 TOBACCO USE: ICD-10-CM

## 2024-05-09 DIAGNOSIS — R55 SYNCOPE, UNSPECIFIED SYNCOPE TYPE: Primary | ICD-10-CM

## 2024-05-09 DIAGNOSIS — R94.31 ABNORMAL EKG: ICD-10-CM

## 2024-05-09 LAB
ALBUMIN SERPL BCP-MCNC: 4.2 G/DL (ref 3.5–5.2)
ALP SERPL-CCNC: 89 U/L (ref 38–126)
ALT SERPL W/O P-5'-P-CCNC: 8 U/L (ref 10–44)
ANION GAP SERPL CALC-SCNC: 10 MMOL/L (ref 8–16)
AST SERPL-CCNC: 23 U/L (ref 15–46)
BASOPHILS # BLD AUTO: 0.09 K/UL (ref 0–0.2)
BASOPHILS NFR BLD: 1.3 % (ref 0–1.9)
BILIRUB SERPL-MCNC: 0.4 MG/DL (ref 0.1–1)
CALCIUM SERPL-MCNC: 9.3 MG/DL (ref 8.7–10.5)
CHLORIDE SERPL-SCNC: 106 MMOL/L (ref 95–110)
CO2 SERPL-SCNC: 27 MMOL/L (ref 23–29)
CREAT SERPL-MCNC: 1.33 MG/DL (ref 0.5–1.4)
DIFFERENTIAL METHOD BLD: ABNORMAL
EOSINOPHIL # BLD AUTO: 0.1 K/UL (ref 0–0.5)
EOSINOPHIL NFR BLD: 1.9 % (ref 0–8)
ERYTHROCYTE [DISTWIDTH] IN BLOOD BY AUTOMATED COUNT: 15.5 % (ref 11.5–14.5)
EST. GFR  (NO RACE VARIABLE): 56.8 ML/MIN/1.73 M^2
GLUCOSE SERPL-MCNC: 85 MG/DL (ref 70–110)
HCT VFR BLD AUTO: 41.8 % (ref 40–54)
HGB BLD-MCNC: 13 G/DL (ref 14–18)
IMM GRANULOCYTES # BLD AUTO: 0.01 K/UL (ref 0–0.04)
IMM GRANULOCYTES NFR BLD AUTO: 0.1 % (ref 0–0.5)
LYMPHOCYTES # BLD AUTO: 2.7 K/UL (ref 1–4.8)
LYMPHOCYTES NFR BLD: 40.4 % (ref 18–48)
MCH RBC QN AUTO: 25.5 PG (ref 27–31)
MCHC RBC AUTO-ENTMCNC: 31.1 G/DL (ref 32–36)
MCV RBC AUTO: 82 FL (ref 82–98)
MONOCYTES # BLD AUTO: 0.7 K/UL (ref 0.3–1)
MONOCYTES NFR BLD: 10.3 % (ref 4–15)
NEUTROPHILS # BLD AUTO: 3.1 K/UL (ref 1.8–7.7)
NEUTROPHILS NFR BLD: 46 % (ref 38–73)
NRBC BLD-RTO: 0 /100 WBC
PLATELET # BLD AUTO: 286 K/UL (ref 150–450)
PMV BLD AUTO: 9.4 FL (ref 9.2–12.9)
POTASSIUM SERPL-SCNC: 5 MMOL/L (ref 3.5–5.1)
PROT SERPL-MCNC: 7.4 G/DL (ref 6–8.4)
RBC # BLD AUTO: 5.09 M/UL (ref 4.6–6.2)
SODIUM SERPL-SCNC: 143 MMOL/L (ref 136–145)
TROPONIN I SERPL-MCNC: <0.012 NG/ML (ref 0.01–0.03)
UUN UR-MCNC: 22 MG/DL (ref 2–20)
WBC # BLD AUTO: 6.78 K/UL (ref 3.9–12.7)

## 2024-05-09 PROCEDURE — 1159F MED LIST DOCD IN RCRD: CPT | Mod: CPTII,S$GLB,, | Performed by: PHYSICIAN ASSISTANT

## 2024-05-09 PROCEDURE — 85025 COMPLETE CBC W/AUTO DIFF WBC: CPT | Mod: PN | Performed by: PHYSICIAN ASSISTANT

## 2024-05-09 PROCEDURE — 1101F PT FALLS ASSESS-DOCD LE1/YR: CPT | Mod: CPTII,S$GLB,, | Performed by: PHYSICIAN ASSISTANT

## 2024-05-09 PROCEDURE — 1126F AMNT PAIN NOTED NONE PRSNT: CPT | Mod: CPTII,S$GLB,, | Performed by: PHYSICIAN ASSISTANT

## 2024-05-09 PROCEDURE — 84484 ASSAY OF TROPONIN QUANT: CPT | Mod: PN | Performed by: PHYSICIAN ASSISTANT

## 2024-05-09 PROCEDURE — 1160F RVW MEDS BY RX/DR IN RCRD: CPT | Mod: CPTII,S$GLB,, | Performed by: PHYSICIAN ASSISTANT

## 2024-05-09 PROCEDURE — 3078F DIAST BP <80 MM HG: CPT | Mod: CPTII,S$GLB,, | Performed by: PHYSICIAN ASSISTANT

## 2024-05-09 PROCEDURE — 3074F SYST BP LT 130 MM HG: CPT | Mod: CPTII,S$GLB,, | Performed by: PHYSICIAN ASSISTANT

## 2024-05-09 PROCEDURE — 36415 COLL VENOUS BLD VENIPUNCTURE: CPT | Mod: PN | Performed by: PHYSICIAN ASSISTANT

## 2024-05-09 PROCEDURE — 93005 ELECTROCARDIOGRAM TRACING: CPT | Mod: S$GLB,,, | Performed by: PHYSICIAN ASSISTANT

## 2024-05-09 PROCEDURE — 99406 BEHAV CHNG SMOKING 3-10 MIN: CPT | Mod: S$GLB,,, | Performed by: PHYSICIAN ASSISTANT

## 2024-05-09 PROCEDURE — 80053 COMPREHEN METABOLIC PANEL: CPT | Mod: PN | Performed by: PHYSICIAN ASSISTANT

## 2024-05-09 PROCEDURE — 93010 ELECTROCARDIOGRAM REPORT: CPT | Mod: S$GLB,,, | Performed by: INTERNAL MEDICINE

## 2024-05-09 PROCEDURE — 99215 OFFICE O/P EST HI 40 MIN: CPT | Mod: 25,S$GLB,, | Performed by: PHYSICIAN ASSISTANT

## 2024-05-09 PROCEDURE — 3008F BODY MASS INDEX DOCD: CPT | Mod: CPTII,S$GLB,, | Performed by: PHYSICIAN ASSISTANT

## 2024-05-09 PROCEDURE — 3288F FALL RISK ASSESSMENT DOCD: CPT | Mod: CPTII,S$GLB,, | Performed by: PHYSICIAN ASSISTANT

## 2024-05-09 NOTE — PROGRESS NOTES
" Patient ID: Gama Huertas is a 73 y.o. male.     Chief Complaint: Gastroesophageal Reflux, Dizziness, and Blurred Vision    73 year old male with history of dementia, anxiety, COPD, HTN, unspecified disorders of glucose transport, and alcohol abuse presents to clinic with complaints of "high blood pressure and low blood pressure, heart burn, and passing out" beginning 5 days ago. Patient accompanied by partner in clinic today. Patient and partner bickering loudly throughout exam. Patient admits he has had syncope with LOC x3 in the past month. Partner witnessed these events. Pt medically noncompliant. Admits to smoking 2 packs of cigarettes per day as well as drinking "rum coolers" on a regular basis. Cannot quantify. He denies CP, SOB, diaphoresis, N/V/D. Reports to taking all medication as directed. Reports taking multiple BC powders daily for headaches.        Review of Systems  Review of Systems   Constitutional:  Negative for fever.   HENT:  Negative for ear pain and sinus pain.    Eyes:  Negative for discharge.   Respiratory:  Negative for cough and wheezing.    Cardiovascular:  Negative for chest pain and leg swelling.   Gastrointestinal:  Positive for heartburn. Negative for diarrhea, nausea and vomiting.   Genitourinary:  Negative for urgency.   Musculoskeletal:  Negative for myalgias.   Skin:  Negative for rash.   Neurological:  Positive for dizziness and loss of consciousness. Negative for weakness and headaches.   Psychiatric/Behavioral:  Negative for depression.        Currently Medications  Current Outpatient Medications on File Prior to Visit   Medication Sig Dispense Refill    amLODIPine (NORVASC) 10 MG tablet TAKE 1 TABLET BY MOUTH ONCE DAY 90 tablet 3    EScitalopram oxalate (LEXAPRO) 5 MG Tab Take 1 tablet (5 mg total) by mouth once daily. 100 tablet 11    FEROSUL 325 mg (65 mg iron) Tab tablet TAKE 1 TABLET BY MOUTH ONCE A DAY 90 tablet 3    folic acid (FOLVITE) 1 MG tablet Take 1 tablet (1 " "mg total) by mouth once daily. 100 tablet 11    gabapentin (NEURONTIN) 300 MG capsule Take 1 capsule (300 mg total) by mouth 2 (two) times daily. 200 capsule 11    metoprolol tartrate (LOPRESSOR) 50 MG tablet Take 1 tablet (50 mg total) by mouth 2 (two) times daily. 180 tablet 3    nicotine polacrilex (NICORETTE) 4 MG Gum Take 1 each (4 mg total) by mouth as needed. 200 each 11    pantoprazole (PROTONIX) 40 MG tablet Take 1 tablet (40 mg total) by mouth once daily. 90 tablet 3     No current facility-administered medications on file prior to visit.       Physical  Exam  Vitals:    05/09/24 1138   BP: 128/64   BP Location: Left arm   Patient Position: Sitting   Pulse: 72   Temp: 98 °F (36.7 °C)   SpO2: 95%   Weight: 54.7 kg (120 lb 9.5 oz)   Height: 5' 8" (1.727 m)      Body mass index is 18.34 kg/m².  Wt Readings from Last 3 Encounters:   05/09/24 54.7 kg (120 lb 9.5 oz)   08/23/23 54.3 kg (119 lb 13.1 oz)   07/11/23 61.2 kg (135 lb)       Physical Exam  Vitals and nursing note reviewed.   Constitutional:       General: He is not in acute distress.     Appearance: He is underweight. He is ill-appearing.   HENT:      Head: Normocephalic and atraumatic.      Right Ear: External ear normal.      Left Ear: External ear normal.      Nose: Nose normal.      Mouth/Throat:      Mouth: Mucous membranes are moist.   Eyes:      Extraocular Movements: Extraocular movements intact.      Conjunctiva/sclera: Conjunctivae normal.   Cardiovascular:      Rate and Rhythm: Regular rhythm. Bradycardia present.      Pulses: Normal pulses.      Heart sounds: Murmur heard.   Pulmonary:      Effort: Pulmonary effort is normal. No respiratory distress.      Breath sounds: Decreased breath sounds present. No wheezing.   Abdominal:      General: There is no distension.      Palpations: Abdomen is soft. There is no mass.      Tenderness: There is no abdominal tenderness.   Musculoskeletal:         General: No swelling.      Cervical back: " "Normal range of motion.   Skin:     Coloration: Skin is not jaundiced.      Findings: No rash.   Neurological:      General: No focal deficit present.      Mental Status: He is alert and oriented to person, place, and time.   Psychiatric:         Attention and Perception: He is inattentive.         Mood and Affect: Mood normal. Affect is inappropriate.         Speech: Speech is tangential.         Behavior: Behavior is uncooperative.         Thought Content: Thought content normal.         Judgment: Judgment is impulsive and inappropriate.         Labs:    Complete Blood Count  Lab Results   Component Value Date    RBC 5.09 05/09/2024    HGB 13.0 (L) 05/09/2024    HCT 41.8 05/09/2024    MCV 82 05/09/2024    MCH 25.5 (L) 05/09/2024    MCHC 31.1 (L) 05/09/2024    RDW 15.5 (H) 05/09/2024     05/09/2024    MPV 9.4 05/09/2024    GRAN 3.1 05/09/2024    GRAN 46.0 05/09/2024    LYMPH 2.7 05/09/2024    LYMPH 40.4 05/09/2024    MONO 0.7 05/09/2024    MONO 10.3 05/09/2024    EOS 0.1 05/09/2024    BASO 0.09 05/09/2024    EOSINOPHIL 1.9 05/09/2024    BASOPHIL 1.3 05/09/2024    DIFFMETHOD Automated 05/09/2024       Comprehensive Metabolic Panel  Lab Results   Component Value Date    GLU 85 05/09/2024    BUN 22 (H) 05/09/2024    CREATININE 1.33 05/09/2024     05/09/2024    K 5.0 05/09/2024     05/09/2024    PROT 7.4 05/09/2024    ALBUMIN 4.2 05/09/2024    BILITOT 0.4 05/09/2024    AST 23 05/09/2024    ALKPHOS 89 05/09/2024    CO2 27 05/09/2024    ALT 8 (L) 05/09/2024    ANIONGAP 10 05/09/2024       TSH  No results found for: "TSH"    Imaging:  CT Chest Lung Screening Low Dose  Narrative: EXAMINATION:  CT CHEST LUNG SCREENING LOW DOSE    CLINICAL HISTORY:  Lung cancer screening, >= 30 pk-yr current smoker (Age 55-80y); Nicotine dependence, unspecified, uncomplicated    TECHNIQUE:  CT of the thorax was performed with low dose, lung screening protocol.  No contrast was administered.  Sagittal and coronal " reconstructions were obtained.    All CT scans at this location are performed using dose modulation techniques as appropriate to a performed exam including the following: Automated exposure control; adjustment of the mA and/or kV  according to patient size.    COMPARISON:  02/20/2019    FINDINGS:  Lungs: Mild upper lobe predominant centrilobular emphysema. Mild subsegmental atelectasis anterior left upper lobe abutting the pleura.  No suspicious pulmonary nodules.    Pleura:   No effusion..    Heart and pericardium: Normal size without effusion.    Aorta and vasculature: Mild aortic and coronary artery atherosclerotic calcification.    Chest wall and skeletal structures: Bones appear osteopenic.  Mild chronic superior endplate compression of L1 unchanged.    Upper abdomen: Unremarkable.  Impression: Lung-RADS Category:  1 - Negative - Continue annual screening with LDCT in 12 months.    Clinically or potentially clinically significant non lung cancer finding:  None.    Prior Lung Cancer Modifier:  No history of prior lung cancer.    Electronically signed by: Hayden Sauceda  Date:    08/28/2023  Time:    14:34      Assessment/Plan:    1. Syncope, unspecified syncope type  Assessment & Plan:  - EKG obtained in clinic today showing new inferior and interior infarct, LVH, and sinus priti with sinus arrhythmia as compared to last EKG 1 year ago  - Advised pt to report to the ED for further eval and treatment with differential including but not limited to: orthostatic hypotension, aortic stenosis, anemia, GI bleed, paroxysmal afib, stroke, MI  - Pt refused and AMA signed. Unable to obtain orthostatic vitals or glucose due to pt leaving  - Ordered labs and urine outpatient  - Advised pt to report to ED with worsening symptoms, or if syncope continues  - In the case of potential orthostatic hypotension, decrease metoprolol to 1/2 tab BID and amlodipine to 1/2 QD until see PCP on 6/20/2024    Orders:  -     IN OFFICE EKG  12-LEAD (to [facility])  -     Orthostatic vital signs  -     CBC auto differential; Future; Expected date: 05/09/2024  -     Comprehensive metabolic panel; Future; Expected date: 05/09/2024  -     POCT Glucose, Hand-Held Device  -     Urinalysis, Reflex to Urine Culture Urine, Clean Catch; Future; Expected date: 05/09/2024  -     TROPONIN I; Future; Expected date: 05/09/2024    2. History of noncompliance with medical treatment, presenting hazards to health  Comments:  - See syncope  Overview:  - See syncope      3. Tobacco use  Comments:  - 2 packs of cigarettes per day  - Pt uninterested in quitting at this time    4. Disorders of glucose transport, unspecified  Comments:  - Chronic, unsure whether stable  - Continue medication  - Follow with PCP    5. Aortic atherosclerosis  Comments:  - Chronic, unsure whether stable  - Continue medication  - Follow with PCP    6. Pulmonary emphysema, unspecified emphysema type  Comments:  - Chronic, unsure whether stable  - Continue medication  - Follow with PCP    7. Dementia without behavioral disturbance  Comments:  - Chronic, unsure whether stable  - Continue medication  - Follow with behavioral health    8. Alcohol use disorder, severe, dependence  Comments:  - Chronic, unsure whether stable  - Continue to cut down  - Follow with PCP    9. Other nonthrombocytopenic purpura  Comments:  - Chronic, unsure whether stable  - Continue medication  - Follow with PCP         Discussed how to stay healthy including: diet, exercise, refraining from smoking and discussed screening exams / tests needed for age, sex and family Hx.    RTC with 6/20/2024    Shanae Barrera PA-C

## 2024-05-09 NOTE — ASSESSMENT & PLAN NOTE
- EKG obtained in clinic today showing new inferior and interior infarct, LVH, and sinus priti with sinus arrhythmia as compared to last EKG 1 year ago  - Advised pt to report to the ED for further eval and treatment with differential including but not limited to: orthostatic hypotension, aortic stenosis, anemia, GI bleed, paroxysmal afib, stroke, MI  - Pt refused and AMA signed. Unable to obtain orthostatic vitals or glucose due to pt leaving  - Ordered labs and urine outpatient  - Advised pt to report to ED with worsening symptoms, or if syncope continues  - In the case of potential orthostatic hypotension, decrease metoprolol to 1/2 tab BID and amlodipine to 1/2 QD until see PCP on 6/20/2024

## 2024-05-09 NOTE — PATIENT INSTRUCTIONS
Metoprolol: take 1/2 tablet twice daily  Amlodipine: take 1/2 tablet once daily  Blood work today  Appointment with Dr. Meléndez

## 2024-05-10 ENCOUNTER — TELEPHONE (OUTPATIENT)
Dept: FAMILY MEDICINE | Facility: CLINIC | Age: 74
End: 2024-05-10
Payer: MEDICARE

## 2024-05-10 NOTE — TELEPHONE ENCOUNTER
----- Message from Shanae Barrera PA-C sent at 5/9/2024  3:06 PM CDT -----  Labs stable. Awaiting urine sample

## 2024-05-12 LAB
OHS QRS DURATION: 84 MS
OHS QTC CALCULATION: 422 MS

## 2024-06-20 ENCOUNTER — TELEPHONE (OUTPATIENT)
Dept: FAMILY MEDICINE | Facility: CLINIC | Age: 74
End: 2024-06-20

## 2024-06-20 NOTE — TELEPHONE ENCOUNTER
----- Message from Richard Meléndez MD sent at 6/20/2024  6:57 AM CDT -----    ----- Message -----  From: Nohemi Mistry  Sent: 6/19/2024   2:53 PM CDT  To: Richard Meléndez MD    Type:  Needs Medical Advice    Who Called:  PT   Would the patient rather a call back or a response via [facility]? Callback   Best Call Back Number: [phone]  Additional Information: caller is requesting a callback from this provider office in regards to appt change date

## 2024-07-04 ENCOUNTER — HOSPITAL ENCOUNTER (EMERGENCY)
Facility: HOSPITAL | Age: 74
Discharge: HOME | End: 2024-07-04
Attending: FAMILY MEDICINE
Payer: MEDICARE

## 2024-07-04 VITALS
DIASTOLIC BLOOD PRESSURE: 93 MMHG | BODY MASS INDEX: 18.89 KG/M2 | TEMPERATURE: 98 F | HEIGHT: 67 IN | HEART RATE: 65 BPM | RESPIRATION RATE: 16 BRPM | OXYGEN SATURATION: 95 % | SYSTOLIC BLOOD PRESSURE: 192 MMHG

## 2024-07-04 DIAGNOSIS — R41.82 ALTERED MENTAL STATUS, UNSPECIFIED ALTERED MENTAL STATUS TYPE: Primary | ICD-10-CM

## 2024-07-04 LAB
ALBUMIN SERPL BCP-MCNC: 3.9 G/DL (ref 3.5–5.2)
ALP SERPL-CCNC: 82 U/L (ref 38–126)
ALT SERPL W/O P-5'-P-CCNC: 11 U/L (ref 10–44)
AMPHET+METHAMPHET UR QL: NEGATIVE
ANION GAP SERPL CALC-SCNC: 13 MMOL/L (ref 8–16)
AST SERPL-CCNC: 25 U/L (ref 15–46)
BARBITURATES UR QL SCN>200 NG/ML: NEGATIVE
BASOPHILS # BLD AUTO: 0.06 K/UL (ref 0–0.2)
BASOPHILS NFR BLD: 1.2 % (ref 0–1.9)
BENZODIAZ UR QL SCN>200 NG/ML: NEGATIVE
BILIRUB SERPL-MCNC: 0.4 MG/DL (ref 0.1–1)
BILIRUB UR QL STRIP: NEGATIVE
BZE UR QL SCN: NEGATIVE
CALCIUM SERPL-MCNC: 9.1 MG/DL (ref 8.7–10.5)
CANNABINOIDS UR QL SCN: NEGATIVE
CHLORIDE SERPL-SCNC: 106 MMOL/L (ref 95–110)
CHOLEST SERPL-MCNC: 139 MG/DL (ref 120–199)
CHOLEST/HDLC SERPL: 3 {RATIO} (ref 2–5)
CLARITY UR REFRACT.AUTO: CLEAR
CO2 SERPL-SCNC: 22 MMOL/L (ref 23–29)
COLOR UR AUTO: YELLOW
CREAT SERPL-MCNC: 0.9 MG/DL (ref 0.5–1.4)
CREAT UR-MCNC: 62.7 MG/DL (ref 23–375)
DIFFERENTIAL METHOD BLD: ABNORMAL
EOSINOPHIL # BLD AUTO: 0.1 K/UL (ref 0–0.5)
EOSINOPHIL NFR BLD: 1.9 % (ref 0–8)
ERYTHROCYTE [DISTWIDTH] IN BLOOD BY AUTOMATED COUNT: 15.4 % (ref 11.5–14.5)
EST. GFR  (NO RACE VARIABLE): >60 ML/MIN/1.73 M^2
ETHANOL SERPL-MCNC: <10 MG/DL
GLUCOSE SERPL-MCNC: 84 MG/DL (ref 70–110)
GLUCOSE UR QL STRIP: NEGATIVE
HCT VFR BLD AUTO: 34.7 % (ref 40–54)
HDLC SERPL-MCNC: 46 MG/DL (ref 40–75)
HDLC SERPL: 33.1 % (ref 20–50)
HGB BLD-MCNC: 10.8 G/DL (ref 14–18)
HGB UR QL STRIP: ABNORMAL
IMM GRANULOCYTES # BLD AUTO: 0.01 K/UL (ref 0–0.04)
IMM GRANULOCYTES NFR BLD AUTO: 0.2 % (ref 0–0.5)
INR PPP: 1.1 (ref 0.8–1.2)
KETONES UR QL STRIP: NEGATIVE
LDLC SERPL CALC-MCNC: 80.2 MG/DL (ref 63–159)
LEUKOCYTE ESTERASE UR QL STRIP: NEGATIVE
LYMPHOCYTES # BLD AUTO: 1.5 K/UL (ref 1–4.8)
LYMPHOCYTES NFR BLD: 29 % (ref 18–48)
MCH RBC QN AUTO: 25.3 PG (ref 27–31)
MCHC RBC AUTO-ENTMCNC: 31.1 G/DL (ref 32–36)
MCV RBC AUTO: 81 FL (ref 82–98)
METHADONE UR QL SCN>300 NG/ML: NEGATIVE
MONOCYTES # BLD AUTO: 0.5 K/UL (ref 0.3–1)
MONOCYTES NFR BLD: 9.9 % (ref 4–15)
NEUTROPHILS # BLD AUTO: 3 K/UL (ref 1.8–7.7)
NEUTROPHILS NFR BLD: 57.8 % (ref 38–73)
NITRITE UR QL STRIP: NEGATIVE
NONHDLC SERPL-MCNC: 93 MG/DL
NRBC BLD-RTO: 0 /100 WBC
OPIATES UR QL SCN: NEGATIVE
PCP UR QL SCN>25 NG/ML: NEGATIVE
PH UR STRIP: 6 [PH] (ref 5–8)
PLATELET # BLD AUTO: 281 K/UL (ref 150–450)
PMV BLD AUTO: 8.8 FL (ref 9.2–12.9)
POCT GLUCOSE: 98 MG/DL (ref 70–110)
POTASSIUM SERPL-SCNC: 3.6 MMOL/L (ref 3.5–5.1)
PROT SERPL-MCNC: 6.9 G/DL (ref 6–8.4)
PROT UR QL STRIP: NEGATIVE
PROTHROMBIN TIME: 11.7 SEC (ref 9–12.5)
RBC # BLD AUTO: 4.27 M/UL (ref 4.6–6.2)
SODIUM SERPL-SCNC: 141 MMOL/L (ref 136–145)
SP GR UR STRIP: 1.02 (ref 1–1.03)
TOXICOLOGY INFORMATION: NORMAL
TRIGL SERPL-MCNC: 64 MG/DL (ref 30–150)
TSH SERPL DL<=0.005 MIU/L-ACNC: 0.98 UIU/ML (ref 0.4–4)
URN SPEC COLLECT METH UR: ABNORMAL
UROBILINOGEN UR STRIP-ACNC: NEGATIVE EU/DL
UUN UR-MCNC: 16 MG/DL (ref 2–20)
WBC # BLD AUTO: 5.14 K/UL (ref 3.9–12.7)

## 2024-07-04 PROCEDURE — 82962 GLUCOSE BLOOD TEST: CPT | Mod: ER

## 2024-07-04 PROCEDURE — 84443 ASSAY THYROID STIM HORMONE: CPT | Mod: ER | Performed by: FAMILY MEDICINE

## 2024-07-04 PROCEDURE — G0426 INPT/ED TELECONSULT50: HCPCS | Mod: 95,,, | Performed by: PSYCHIATRY & NEUROLOGY

## 2024-07-04 PROCEDURE — 93010 ELECTROCARDIOGRAM REPORT: CPT | Mod: ,,, | Performed by: INTERNAL MEDICINE

## 2024-07-04 PROCEDURE — 81003 URINALYSIS AUTO W/O SCOPE: CPT | Mod: ER,59 | Performed by: FAMILY MEDICINE

## 2024-07-04 PROCEDURE — 80307 DRUG TEST PRSMV CHEM ANLYZR: CPT | Mod: ER | Performed by: FAMILY MEDICINE

## 2024-07-04 PROCEDURE — 25500020 PHARM REV CODE 255: Mod: ER | Performed by: FAMILY MEDICINE

## 2024-07-04 PROCEDURE — 99285 EMERGENCY DEPT VISIT HI MDM: CPT | Mod: 25,ER

## 2024-07-04 PROCEDURE — 80061 LIPID PANEL: CPT | Performed by: FAMILY MEDICINE

## 2024-07-04 PROCEDURE — 82077 ASSAY SPEC XCP UR&BREATH IA: CPT | Mod: ER | Performed by: FAMILY MEDICINE

## 2024-07-04 PROCEDURE — 93005 ELECTROCARDIOGRAM TRACING: CPT | Mod: ER

## 2024-07-04 PROCEDURE — 80053 COMPREHEN METABOLIC PANEL: CPT | Mod: ER | Performed by: FAMILY MEDICINE

## 2024-07-04 PROCEDURE — 85025 COMPLETE CBC W/AUTO DIFF WBC: CPT | Mod: ER | Performed by: FAMILY MEDICINE

## 2024-07-04 PROCEDURE — 85610 PROTHROMBIN TIME: CPT | Mod: ER | Performed by: FAMILY MEDICINE

## 2024-07-04 PROCEDURE — 96360 HYDRATION IV INFUSION INIT: CPT | Mod: ER

## 2024-07-04 PROCEDURE — 25000003 PHARM REV CODE 250: Mod: ER | Performed by: FAMILY MEDICINE

## 2024-07-04 RX ORDER — LATANOPROST 50 UG/ML
1 SOLUTION/ DROPS OPHTHALMIC NIGHTLY
COMMUNITY
Start: 2024-05-13

## 2024-07-04 RX ORDER — ESCITALOPRAM OXALATE 5 MG/1
5 TABLET ORAL DAILY
COMMUNITY
End: 2024-07-13

## 2024-07-04 RX ORDER — DORZOLAMIDE HYDROCHLORIDE AND TIMOLOL MALEATE 20; 5 MG/ML; MG/ML
1 SOLUTION/ DROPS OPHTHALMIC 2 TIMES DAILY
COMMUNITY
Start: 2024-04-29

## 2024-07-04 RX ADMIN — IOHEXOL 100 ML: 350 INJECTION, SOLUTION INTRAVENOUS at 06:07

## 2024-07-04 RX ADMIN — SODIUM CHLORIDE 1000 ML: 9 INJECTION, SOLUTION INTRAVENOUS at 03:07

## 2024-07-04 NOTE — TELEMEDICINE CONSULT
[facility]  Vascular Neurology  Comprehensive Stroke Center  TeleVascular Neurology Acute Consultation Note        Consult Information  Consult to Telemedicine - Acute Stroke  Consult performed by: Vito Brewer MD  Consult ordered by: Calixto Owen MD          Consulting Provider: CALIXTO OWEN   Current Providers  No providers found    Patient Location:  [facility] EMERGENCY DEPARTMENT Emergency Department    Spoke hospital nurse at bedside with patient assisting consultant.  Patient information was obtained from patient.       Stroke Documentation  Acute Stroke Times   Last Known Normal Date: 07/04/24  Last Known Normal Time: 1200  Stroke Team Called Time: 1411  Stroke Team Arrival Time: 1416  CT Interpretation Time: 1416    NIH Scale:  1a. Level of Consciousness: 0-->Alert, keenly responsive  1b. LOC Questions: 0-->Answers both questions correctly  1c. LOC Commands: 0-->Performs both tasks correctly  2. Best Gaze: 0-->Normal  3. Visual: 0-->No visual loss  4. Facial Palsy: 0-->Normal symmetrical movements  5a. Motor Arm, Left: 0-->No drift, limb holds 90 (or 45) degrees for full 10 secs  5b. Motor Arm, Right: 0-->No drift, limb holds 90 (or 45) degrees for full 10 secs  6a. Motor Leg, Left: 0-->No drift, leg holds 30 degree position for full 5 secs  6b. Motor Leg, Right: 0-->No drift, leg holds 30 degree position for full 5 secs  7. Limb Ataxia: 0-->Absent  8. Sensory: 0-->Normal, no sensory loss  9. Best Language: 0-->No aphasia, normal  10. Dysarthria: 1-->Mild-to-moderate dysarthria, patient slurs at least some words and, at worst, can be understood with some difficulty  11. Extinction and Inattention (formerly Neglect): 0-->No abnormality  Total (NIH Stroke Scale): 1      Modified Alba:    Fulshear Coma Scale:     ABCD2 Score:    [id number] Score:    HAS -BLED Score:    ICH Score:    Hunt & Walters Classification:      Blood pressure (!) 183/80, pulse 71, temperature 98.7 °F  "(37.1 °C), resp. rate 12, height 5' 7" (1.702 m), SpO2 99%.    Van Negative  In your opinion, this was a: Tier 1     Medical Decision Making  HPI:  73 y.o. male found sleeping behind a trash can.     Imaging personally reviewed & interpreted:  CT Brain: no evidence of early or subacute ischemic changes, intracerebral hemorrhage or hyperdense vessel signs  CTA Head & Neck: cervico-cephalic vasculature negative for any clear contributing large or medium vessel occlusion related to the patient's current presentation and no targets identified for acute or urgent reperfusion therapy     Assessment and plan:  Patient nonfocal currently. Mild dysarthria but no findings consistent with acute focal neurovascular syndrome.  NO further stroke workup unless symptoms change.    Lytics recommendation: Thrombolytic therapy not recommended due to Suspected stroke mimic   Thrombectomy recommendation: No; at this time symptoms not suggestive of large vessel occlusion  Placement recommendation: pending further studies        Current Outpatient Medications   Medication Instructions    amLODIPine (NORVASC) 10 MG tablet TAKE 1 TABLET BY MOUTH ONCE DAY    EScitalopram oxalate (LEXAPRO) 5 mg, Oral, Daily    FEROSUL 325 mg (65 mg iron) Tab tablet TAKE 1 TABLET BY MOUTH ONCE A DAY    folic acid (FOLVITE) 1 mg, Oral, Daily    gabapentin (NEURONTIN) 300 mg, Oral, 2 times daily    metoprolol tartrate (LOPRESSOR) 50 mg, Oral, 2 times daily    nicotine polacrilex (NICORETTE) 4 mg, Oral, As needed (PRN)    pantoprazole (PROTONIX) 40 mg, Oral, Daily     Past Medical History:   Diagnosis Date    Gastric ulcer     Hypertension            ROS  Physical Exam  Past Medical History:   Diagnosis Date    Gastric ulcer     Hypertension      Past Surgical History:   Procedure Laterality Date    COLONOSCOPY  3/11/14     repeat in 3 years dr montgomery     Family History   Problem Relation Name Age of Onset    Heart disease Mother      Stomach cancer Father   "    Kidney failure Father      Cancer Father          leukemia       Diagnoses  No problems updated.    Vito Brewer MD      Emergent/Acute neurological consultation requested by spoke provider due to critical concerns for possible cerebrovascular event that could result in permanent loss of neurologic/bodily function, severe disability or death of this patient.  Immediate/timely evaluation by a highly prepared expert is paramount for optimal outcomes  High risk for neurological deterioration if not properly diagnosed  High risk for neurological deterioration if not treated promplty/as soon as possible  Complex diagnostic evaluation may be required (advanced imaging)  High risk treatment options (thrombolytics and/or thrombectomy)    Patient care was coordinated with spoke provider, including but not limted to    Discussing likely diagnosis/etiology of symptoms  Making recommendations for further diagnostic studies  Making recommendations for intravenous thrombolytics or other advanced therapies  Making recommendations for disposition (admission/transfer for higher level of care)

## 2024-07-04 NOTE — ED PROVIDER NOTES
"Encounter Date: 7/4/2024       History     Chief Complaint   Patient presents with    Altered Mental Status     Pt reports to ER via EMS after being found outside of a store on [address] behind a dumpster. Pt last known well is around 2 hours ago per EMS. MD in room. Pt states "I am not having a stroke." EMS reports AMS, facial droop, and pt favoring one side.      73-year-old male brought to ED by EMS.  Patient was found in altered mental status in back of the store.  Bystander claims he was seen normal 2 hours ago.  Patient lethargic and disoriented.  EMS noted deviation of mouth to left side.  Slurring of speech.  Patient was able to stand and get on to stretcher and moving 4 limbs with normal power as per EMS.  History of alcohol abuse.    The history is provided by the EMS personnel.     Review of patient's allergies indicates:  No Known Allergies  Past Medical History:   Diagnosis Date    Gastric ulcer     Hypertension      Past Surgical History:   Procedure Laterality Date    COLONOSCOPY  3/11/14     repeat in 3 years dr montgomery     Family History   Problem Relation Name Age of Onset    Heart disease Mother      Stomach cancer Father      Kidney failure Father      Cancer Father          leukemia     Social History     Tobacco Use    Smoking status: Every Day     Current packs/day: 0.50     Types: Cigarettes     Passive exposure: Current    Smokeless tobacco: Never   Substance Use Topics    Alcohol use: Yes     Alcohol/week: 35.0 standard drinks of alcohol     Types: 35 Cans of beer per week     Comment: Pt was vague and reported "celebrating too much maybe"    Drug use: Never     Review of Systems    Physical Exam     Initial Vitals [07/04/24 1406]   BP Pulse Resp Temp SpO2   (!) 207/90 71 12 98.7 °F (37.1 °C) 99 %      MAP       --         Physical Exam    ED Course   Procedures  Labs Reviewed   CBC W/ AUTO DIFFERENTIAL - Abnormal; Notable for the following components:       Result Value    RBC 4.27 " (*)     Hemoglobin 10.8 (*)     Hematocrit 34.7 (*)     MCV 81 (*)     MCH 25.3 (*)     MCHC 31.1 (*)     RDW 15.4 (*)     MPV 8.8 (*)     All other components within normal limits   COMPREHENSIVE METABOLIC PANEL - Abnormal; Notable for the following components:    CO2 22 (*)     All other components within normal limits   URINALYSIS, REFLEX TO URINE CULTURE - Abnormal; Notable for the following components:    Occult Blood UA Trace (*)     All other components within normal limits    Narrative:     Preferred Collection Type->Urine, Catheterized  Specimen Source->Urine   PROTIME-INR   TSH   LIPID PANEL   ALCOHOL,MEDICAL (ETHANOL)   DRUG SCREEN PANEL, URINE EMERGENCY    Narrative:     Preferred Collection Type->Urine, Catheterized  Specimen Source->Urine   POCT GLUCOSE        ECG Results              ECG 12 lead (Final result)        Collection Time Result Time QRS Duration OHS QTC Calculation    07/04/24 14:05:43 07/05/24 14:57:40 86 422                     Final result by Interface, Lab In [facility] (07/05/24 14:57:48)                   Narrative:    Test Reason : R29.818,    Vent. Rate : 069 BPM     Atrial Rate : 069 BPM     P-R Int : 162 ms          QRS Dur : 086 ms      QT Int : 394 ms       P-R-T Axes : 080 -29 063 degrees     QTc Int : 422 ms    Normal sinus rhythm  Voltage criteria for left ventricular hypertrophy  Anteroseptal infarct ,age undetermined  Abnormal ECG  No previous ECGs available  Confirmed by Moses Castillo MD ([id number]) on 7/5/2024 2:57:38 PM    Referred By: [email or web address]   SELF           Confirmed By:Moses Castillo MD                                  Imaging Results              CTA Head and Neck (xpd) (Final result)  Result time 07/04/24 19:11:32      Final result by Gracia Leyva MD (07/04/24 19:11:32)                   Impression:      No large vessel occlusion or high-grade stenosis      Electronically signed by: Gracia Leyva  Date:    07/04/2024  Time:    19:11                Narrative:    EXAMINATION:  CTA HEAD AND NECK (XPD)    CLINICAL HISTORY:  Neuro deficit, acute, stroke suspected;    TECHNIQUE:  CT angiogram was performed from the level of the ramón to the top of the head following the IV administration of 100mL of Omnipaque 350.   Sagittal and coronal reconstructions and maximum intensity projection reconstructions were performed. Arterial stenosis percentages are based on NASCET measurement criteria.  Rapid angio rendering MIP post processing provided    COMPARISON:  Noncontrast head CT correlation    FINDINGS:  No large vessel occlusion critical high-grade stenosis sizable aneurysm or dissection    Sinusitis present                                       CT Head Without Contrast (Final result)  Result time 07/04/24 14:09:50      Final result by Lnog Cleaning MD (07/04/24 14:09:50)                   Impression:      Age-related atrophy.  No acute findings.      Electronically signed by: Long Cleaning MD  Date:    07/04/2024  Time:    14:09               Narrative:    EXAMINATION:  CT HEAD WITHOUT CONTRAST    CLINICAL HISTORY:  Neuro deficit, acute, stroke suspected;    Neurological deficit.    TECHNIQUE:  Standard noncontrast CT of the brain.    All CT scans at this facility are performed  using dose modulation techniques as appropriate to performed exam including the following:  automated exposure control; adjustment of mA and/or kV according to the patients size (this includes techniques or standardized protocols for targeted exams where dose is matched to indication/reason for exam: i.e. extremities or head);  iterative reconstruction technique.    COMPARISON:  07/10/2023 CT scan    FINDINGS:  Brain: The ventricles are mild-to-moderately enlarged consistent with volume loss.  No acute edema, hemorrhage or mass effect is present.    Skull: The skullbase is intact.                                       Medications   sodium chloride 0.9% bolus 1,000 mL 1,000 mL (0  mLs Intravenous Stopped 7/4/24 1650)   iohexoL (OMNIPAQUE 350) injection 100 mL (100 mLs Intravenous Given 7/4/24 1845)     Medical Decision Making  Differential diagnosis include and not limited to- drug abuse, alcohol abuse, CVA, intracranial bleed, electrolyte imbalance.    Patient awakens with deep sternal rub.  Moving his upper and lower limbs with subjective strength but not focal.  Deviation of mouth to left side.  On review of old chart patient has multiple ER visits secondary to alcohol intoxication.  2020 patient has  Bell's palsy.  On revaluation patient mental status has improved and answering questions.  Moving his upper arms normally.  Denies drinking alcohol or taking any street drugs.  Patient claims he sometimes falls asleep and that is normal to his body.  Urinalysis normal, UDS negative, electrolytes stable, hemoglobin 10.8, WBC 5.1.  Patient mental status has improved but he still drowsy and sleepy.  Unable to stand up, walk and bear weight.  Will get CTA of head and neck.  Patient recommended for inpatient admission but he refused.  Pending test results patient is turned over to Dr. Antoine at shift change.    Amount and/or Complexity of Data Reviewed  External Data Reviewed: notes.     Details: On review of old chart patient has multiple ER visits secondary to alcohol intoxication.  2020 patient has  Bell's palsy.    Labs: ordered. Decision-making details documented in ED Course.  Radiology: ordered. Decision-making details documented in ED Course.  Discussion of management or test interpretation with external provider(s): Tele neurology consult with Dr. Chelsi lainez does not think patient has stroke.  Possible maybe metabolic.    Risk  Prescription drug management.               ED Course as of 07/06/24 0659   Thu Jul 04, 2024 1953 Patient received in sign-out at 6:00 p.m. pending CTA and reassessment.  CTA is reassuring and on my exam he was easily arousable just with MERS verbal stimuli.  He  was able to get up and out of bed on his own and ambulate with a steady gait.  Notes he has a ride to go stay with family and is requesting discharge home at this time.  He was very comfortable with this plan.  He believes he may have drank 1 wine cooler earlier today but not more than that.  He states sometimes when he was outside for a long time his blood pressure gets low and he was not sure if that is what happened.  Encouraged close primary care follow-up.  While hospitalization was offered he states he would like to go home at this time. [HL]      ED Course User Index  [HL] Rosalinda Xiong DO                           Clinical Impression:  Final diagnoses:  [R41.82] Altered mental status, unspecified altered mental status type (Primary)          ED Disposition Condition    Discharge Stable          ED Prescriptions    None       Follow-up Information    None          Calixto Elizabeth MD  07/06/24 0615

## 2024-07-04 NOTE — SUBJECTIVE & OBJECTIVE
HPI:  73 y.o. male found sleeping behind a trash can.     Imaging personally reviewed & interpreted:  CT Brain: no evidence of early or subacute ischemic changes, intracerebral hemorrhage or hyperdense vessel signs  CTA Head & Neck: cervico-cephalic vasculature negative for any clear contributing large or medium vessel occlusion related to the patient's current presentation and no targets identified for acute or urgent reperfusion therapy     Assessment and plan:  Patient nonfocal currently. Mild dysarthria but no findings consistent with acute focal neurovascular syndrome.  NO further stroke workup unless symptoms change.    Lytics recommendation: Thrombolytic therapy not recommended due to Suspected stroke mimic   Thrombectomy recommendation: No; at this time symptoms not suggestive of large vessel occlusion  Placement recommendation: pending further studies        Current Outpatient Medications   Medication Instructions    amLODIPine (NORVASC) 10 MG tablet TAKE 1 TABLET BY MOUTH ONCE DAY    EScitalopram oxalate (LEXAPRO) 5 mg, Oral, Daily    FEROSUL 325 mg (65 mg iron) Tab tablet TAKE 1 TABLET BY MOUTH ONCE A DAY    folic acid (FOLVITE) 1 mg, Oral, Daily    gabapentin (NEURONTIN) 300 mg, Oral, 2 times daily    metoprolol tartrate (LOPRESSOR) 50 mg, Oral, 2 times daily    nicotine polacrilex (NICORETTE) 4 mg, Oral, As needed (PRN)    pantoprazole (PROTONIX) 40 mg, Oral, Daily     Past Medical History:   Diagnosis Date    Gastric ulcer     Hypertension

## 2024-07-04 NOTE — PHARMACY MED REC
"  [facility]           Pharmacy  Admission Medication History     The home medication history was taken by Ellie Benites.      Medication history obtained from Medications listed below were obtained from: Analytic software- [employer].Unable to assess patient. Verified per dr first profile    Based on information gathered for medication list, you may go to "Admission" then "Reconcile Home Medications" tabs to review and/or act upon those items.     The home medication list has been updated by the Pharmacy department.   Please read ALL comments highlighted in yellow.   Please address this information as you see fit.    Feel free to contact us if you have any questions or require assistance.      No current facility-administered medications on file prior to encounter.     Current Outpatient Medications on File Prior to Encounter   Medication Sig Dispense Refill    amLODIPine (NORVASC) 10 MG tablet TAKE 1 TABLET BY MOUTH ONCE DAY (Patient taking differently: Take 10 mg by mouth once daily.) 90 tablet 3    EScitalopram oxalate (LEXAPRO) 5 MG Tab Take 5 mg by mouth once daily.      gabapentin (NEURONTIN) 300 MG capsule Take 1 capsule (300 mg total) by mouth 2 (two) times daily. 200 capsule 11    metoprolol tartrate (LOPRESSOR) 50 MG tablet Take 1 tablet (50 mg total) by mouth 2 (two) times daily. 180 tablet 3    COSOPT 22.3-6.8 mg/mL ophthalmic solution Place 1 drop into both eyes 2 (two) times daily.      FEROSUL 325 mg (65 mg iron) Tab tablet TAKE 1 TABLET BY MOUTH ONCE A DAY 90 tablet 3    folic acid (FOLVITE) 1 MG tablet Take 1 tablet (1 mg total) by mouth once daily. 100 tablet 11    latanoprost 0.005 % ophthalmic solution Place 1 drop into both eyes every evening.      nicotine polacrilex (NICORETTE) 4 MG Gum Take 1 each (4 mg total) by mouth as needed. 200 each 11    pantoprazole (PROTONIX) 40 MG tablet Take 1 tablet (40 mg total) by mouth once daily. 90 tablet 3       Please address this " information as you see fit.  Feel free to contact us if you have any questions or require assistance.    Ellie Benites  [phone]                .

## 2024-07-05 ENCOUNTER — PATIENT OUTREACH (OUTPATIENT)
Dept: EMERGENCY MEDICINE | Facility: HOSPITAL | Age: 74
End: 2024-07-05
Payer: MEDICARE

## 2024-07-05 LAB
OHS QRS DURATION: 86 MS
OHS QTC CALCULATION: 422 MS

## 2024-07-05 NOTE — ED NOTES
"Pt ambulating down hallway, gait steady. Pt states "My family in [place], I want to walk to the store next door." Pt AAOX. Safety maintained.   "

## 2024-07-07 ENCOUNTER — HOSPITAL ENCOUNTER (OUTPATIENT)
Facility: HOSPITAL | Age: 74
Discharge: HOME | End: 2024-07-08
Attending: FAMILY MEDICINE | Admitting: INTERNAL MEDICINE
Payer: MEDICARE

## 2024-07-07 DIAGNOSIS — R41.82 ALTERED MENTAL STATUS, UNSPECIFIED ALTERED MENTAL STATUS TYPE: Primary | ICD-10-CM

## 2024-07-07 DIAGNOSIS — R55 SYNCOPE: ICD-10-CM

## 2024-07-07 DIAGNOSIS — G93.40 ACUTE ENCEPHALOPATHY: ICD-10-CM

## 2024-07-07 PROBLEM — R79.89 ELEVATED BRAIN NATRIURETIC PEPTIDE (BNP) LEVEL: Status: ACTIVE | Noted: 2024-07-07

## 2024-07-07 PROBLEM — N17.9 AKI (ACUTE KIDNEY INJURY): Status: ACTIVE | Noted: 2024-07-07

## 2024-07-07 LAB
ALBUMIN SERPL BCP-MCNC: 4.5 G/DL (ref 3.5–5.2)
ALLENS TEST: ABNORMAL
ALP SERPL-CCNC: 83 U/L (ref 38–126)
ALT SERPL W/O P-5'-P-CCNC: 16 U/L (ref 10–44)
AMMONIA BLD-SCNC: <9 UMOL/L (ref 9–30)
AMPHET+METHAMPHET UR QL: NEGATIVE
ANION GAP SERPL CALC-SCNC: 18 MMOL/L (ref 8–16)
APTT PPP: 32.3 SEC (ref 21–32)
AST SERPL-CCNC: 33 U/L (ref 15–46)
BARBITURATES UR QL SCN>200 NG/ML: NEGATIVE
BASOPHILS # BLD AUTO: 0.05 K/UL (ref 0–0.2)
BASOPHILS NFR BLD: 0.7 % (ref 0–1.9)
BENZODIAZ UR QL SCN>200 NG/ML: NEGATIVE
BILIRUB SERPL-MCNC: 0.5 MG/DL (ref 0.1–1)
BILIRUB UR QL STRIP: NEGATIVE
BZE UR QL SCN: NEGATIVE
CALCIUM SERPL-MCNC: 9.4 MG/DL (ref 8.7–10.5)
CANNABINOIDS UR QL SCN: NEGATIVE
CHLORIDE SERPL-SCNC: 105 MMOL/L (ref 95–110)
CLARITY UR REFRACT.AUTO: CLEAR
CO2 SERPL-SCNC: 20 MMOL/L (ref 23–29)
COLOR UR AUTO: YELLOW
CREAT SERPL-MCNC: 1.4 MG/DL (ref 0.5–1.4)
CREAT UR-MCNC: 54.4 MG/DL (ref 23–375)
D DIMER PPP IA.FEU-MCNC: 0.78 MG/L FEU
DIFFERENTIAL METHOD BLD: ABNORMAL
EOSINOPHIL # BLD AUTO: 0.1 K/UL (ref 0–0.5)
EOSINOPHIL NFR BLD: 1.2 % (ref 0–8)
ERYTHROCYTE [DISTWIDTH] IN BLOOD BY AUTOMATED COUNT: 15.1 % (ref 11.5–14.5)
EST. GFR  (NO RACE VARIABLE): 53.4 ML/MIN/1.73 M^2
ETHANOL SERPL-MCNC: <10 MG/DL
FIO2: 21 %
GLUCOSE SERPL-MCNC: 84 MG/DL (ref 70–110)
GLUCOSE UR QL STRIP: NEGATIVE
HCO3 UR-SCNC: 20 MMOL/L (ref 24–28)
HCT VFR BLD AUTO: 34.2 % (ref 40–54)
HCT VFR BLD CALC: 31 %PCV (ref 36–54)
HGB BLD-MCNC: 10.8 G/DL (ref 14–18)
HGB BLD-MCNC: 11 G/DL
HGB UR QL STRIP: NEGATIVE
IMM GRANULOCYTES # BLD AUTO: 0.01 K/UL (ref 0–0.04)
IMM GRANULOCYTES NFR BLD AUTO: 0.1 % (ref 0–0.5)
INR PPP: 1.1 (ref 0.8–1.2)
KETONES UR QL STRIP: NEGATIVE
LACTATE SERPL-SCNC: 0.9 MMOL/L (ref 0.5–2.2)
LEUKOCYTE ESTERASE UR QL STRIP: NEGATIVE
LYMPHOCYTES # BLD AUTO: 1.5 K/UL (ref 1–4.8)
LYMPHOCYTES NFR BLD: 22.1 % (ref 18–48)
MCH RBC QN AUTO: 25.3 PG (ref 27–31)
MCHC RBC AUTO-ENTMCNC: 31.6 G/DL (ref 32–36)
MCV RBC AUTO: 80 FL (ref 82–98)
METHADONE UR QL SCN>300 NG/ML: NEGATIVE
MONOCYTES # BLD AUTO: 0.9 K/UL (ref 0.3–1)
MONOCYTES NFR BLD: 13.3 % (ref 4–15)
NEUTROPHILS # BLD AUTO: 4.3 K/UL (ref 1.8–7.7)
NEUTROPHILS NFR BLD: 62.6 % (ref 38–73)
NITRITE UR QL STRIP: NEGATIVE
NRBC BLD-RTO: 0 /100 WBC
NT-PROBNP SERPL-MCNC: 1400 PG/ML (ref 5–900)
OPIATES UR QL SCN: NEGATIVE
PCO2 BLDA: 42.3 MMHG (ref 35–45)
PCO2 BLDA: 47.6 MMHG (ref 35–45)
PCP UR QL SCN>25 NG/ML: NEGATIVE
PH SMN: 7.28 [PH] (ref 7.35–7.45)
PH SMN: 7.29 [PH] (ref 7.35–7.45)
PH UR STRIP: 6 [PH] (ref 5–8)
PLATELET # BLD AUTO: 314 K/UL (ref 150–450)
PMV BLD AUTO: 9 FL (ref 9.2–12.9)
PO2 BLDA: 23.5 MMHG (ref 40–60)
PO2 BLDA: 30 MMHG (ref 80–100)
POC BASE DEFICIT: -3.7 MMOL/L (ref -2–2)
POC BE: -7 MMOL/L
POC HCO3: 22.9 MMOL/L (ref 24–28)
POC PERFORMED BY: ABNORMAL
POC SATURATED O2: 34.8 % (ref 95–100)
POC SATURATED O2: 49 % (ref 95–100)
POC TCO2: 21 MMOL/L (ref 23–27)
POCT GLUCOSE: 265 MG/DL (ref 70–110)
POCT GLUCOSE: 67 MG/DL (ref 70–110)
POTASSIUM BLD-SCNC: 3.7 MMOL/L (ref 3.5–5.1)
POTASSIUM SERPL-SCNC: 3.9 MMOL/L (ref 3.5–5.1)
PROT SERPL-MCNC: 7.8 G/DL (ref 6–8.4)
PROT UR QL STRIP: NEGATIVE
PROTHROMBIN TIME: 11.9 SEC (ref 9–12.5)
RBC # BLD AUTO: 4.27 M/UL (ref 4.6–6.2)
SAMPLE: ABNORMAL
SITE: ABNORMAL
SODIUM BLD-SCNC: 141 MMOL/L (ref 136–145)
SODIUM SERPL-SCNC: 143 MMOL/L (ref 136–145)
SP GR UR STRIP: 1.01 (ref 1–1.03)
SPECIMEN SOURCE: ABNORMAL
TOXICOLOGY INFORMATION: NORMAL
TROPONIN I SERPL-MCNC: 0.01 NG/ML (ref 0.01–0.03)
URN SPEC COLLECT METH UR: NORMAL
UROBILINOGEN UR STRIP-ACNC: NEGATIVE EU/DL
UUN UR-MCNC: 38 MG/DL (ref 2–20)
WBC # BLD AUTO: 6.84 K/UL (ref 3.9–12.7)

## 2024-07-07 PROCEDURE — 85730 THROMBOPLASTIN TIME PARTIAL: CPT | Mod: ER | Performed by: FAMILY MEDICINE

## 2024-07-07 PROCEDURE — G0378 HOSPITAL OBSERVATION PER HR: HCPCS

## 2024-07-07 PROCEDURE — 36600 WITHDRAWAL OF ARTERIAL BLOOD: CPT | Mod: ER

## 2024-07-07 PROCEDURE — 80354 DRUG SCREENING FENTANYL: CPT | Mod: ER | Performed by: FAMILY MEDICINE

## 2024-07-07 PROCEDURE — 85610 PROTHROMBIN TIME: CPT | Mod: ER | Performed by: FAMILY MEDICINE

## 2024-07-07 PROCEDURE — 81003 URINALYSIS AUTO W/O SCOPE: CPT | Mod: ER,59 | Performed by: FAMILY MEDICINE

## 2024-07-07 PROCEDURE — 85379 FIBRIN DEGRADATION QUANT: CPT | Mod: ER | Performed by: FAMILY MEDICINE

## 2024-07-07 PROCEDURE — 99900035 HC TECH TIME PER 15 MIN (STAT): Mod: ER

## 2024-07-07 PROCEDURE — 99900035 HC TECH TIME PER 15 MIN (STAT)

## 2024-07-07 PROCEDURE — 93010 ELECTROCARDIOGRAM REPORT: CPT | Mod: ,,, | Performed by: INTERNAL MEDICINE

## 2024-07-07 PROCEDURE — G0378 HOSPITAL OBSERVATION PER HR: HCPCS | Mod: ER

## 2024-07-07 PROCEDURE — 84484 ASSAY OF TROPONIN QUANT: CPT | Mod: ER | Performed by: FAMILY MEDICINE

## 2024-07-07 PROCEDURE — 83605 ASSAY OF LACTIC ACID: CPT | Mod: ER | Performed by: EMERGENCY MEDICINE

## 2024-07-07 PROCEDURE — 80320 DRUG SCREEN QUANTALCOHOLS: CPT | Mod: ER | Performed by: FAMILY MEDICINE

## 2024-07-07 PROCEDURE — 82803 BLOOD GASES ANY COMBINATION: CPT | Mod: ER

## 2024-07-07 PROCEDURE — 84300 ASSAY OF URINE SODIUM: CPT

## 2024-07-07 PROCEDURE — 85025 COMPLETE CBC W/AUTO DIFF WBC: CPT | Mod: ER | Performed by: FAMILY MEDICINE

## 2024-07-07 PROCEDURE — 25000003 PHARM REV CODE 250

## 2024-07-07 PROCEDURE — 80053 COMPREHEN METABOLIC PANEL: CPT | Mod: ER | Performed by: FAMILY MEDICINE

## 2024-07-07 PROCEDURE — 83880 ASSAY OF NATRIURETIC PEPTIDE: CPT | Mod: ER | Performed by: FAMILY MEDICINE

## 2024-07-07 PROCEDURE — 25000003 PHARM REV CODE 250: Mod: ER | Performed by: EMERGENCY MEDICINE

## 2024-07-07 PROCEDURE — 82077 ASSAY SPEC XCP UR&BREATH IA: CPT | Mod: ER | Performed by: FAMILY MEDICINE

## 2024-07-07 PROCEDURE — 25000003 PHARM REV CODE 250: Mod: ER | Performed by: FAMILY MEDICINE

## 2024-07-07 PROCEDURE — 63600175 PHARM REV CODE 636 W HCPCS: Mod: ER | Performed by: FAMILY MEDICINE

## 2024-07-07 PROCEDURE — 82570 ASSAY OF URINE CREATININE: CPT

## 2024-07-07 PROCEDURE — 93005 ELECTROCARDIOGRAM TRACING: CPT | Mod: ER

## 2024-07-07 PROCEDURE — 80307 DRUG TEST PRSMV CHEM ANLYZR: CPT | Mod: ER | Performed by: FAMILY MEDICINE

## 2024-07-07 PROCEDURE — 82140 ASSAY OF AMMONIA: CPT | Mod: ER | Performed by: FAMILY MEDICINE

## 2024-07-07 PROCEDURE — 82803 BLOOD GASES ANY COMBINATION: CPT

## 2024-07-07 RX ORDER — SODIUM CHLORIDE 0.9 % (FLUSH) 0.9 %
10 SYRINGE (ML) INJECTION
Status: DISCONTINUED | OUTPATIENT
Start: 2024-07-07 | End: 2024-07-08 | Stop reason: HOSPADM

## 2024-07-07 RX ORDER — METOPROLOL TARTRATE 50 MG/1
50 TABLET ORAL 2 TIMES DAILY
Status: DISCONTINUED | OUTPATIENT
Start: 2024-07-08 | End: 2024-07-08

## 2024-07-07 RX ORDER — ESCITALOPRAM OXALATE 5 MG/1
5 TABLET ORAL DAILY
Status: DISCONTINUED | OUTPATIENT
Start: 2024-07-08 | End: 2024-07-08 | Stop reason: HOSPADM

## 2024-07-07 RX ORDER — ENOXAPARIN SODIUM 100 MG/ML
30 INJECTION SUBCUTANEOUS EVERY 24 HOURS
Status: DISCONTINUED | OUTPATIENT
Start: 2024-07-08 | End: 2024-07-08

## 2024-07-07 RX ORDER — NALOXONE HCL 0.4 MG/ML
0.4 VIAL (ML) INJECTION
Status: COMPLETED | OUTPATIENT
Start: 2024-07-07 | End: 2024-07-07

## 2024-07-07 RX ORDER — AMLODIPINE BESYLATE 5 MG/1
10 TABLET ORAL DAILY
Status: DISCONTINUED | OUTPATIENT
Start: 2024-07-08 | End: 2024-07-08

## 2024-07-07 RX ADMIN — SODIUM CHLORIDE 1000 ML: 9 INJECTION, SOLUTION INTRAVENOUS at 06:07

## 2024-07-07 RX ADMIN — NALXONE HYDROCHLORIDE 0.4 MG: 0.4 INJECTION INTRAMUSCULAR; INTRAVENOUS; SUBCUTANEOUS at 05:07

## 2024-07-07 RX ADMIN — SODIUM CHLORIDE 1000 ML: 9 INJECTION, SOLUTION INTRAVENOUS at 04:07

## 2024-07-07 RX ADMIN — DEXTROSE MONOHYDRATE 500 ML: 100 INJECTION, SOLUTION INTRAVENOUS at 10:07

## 2024-07-07 NOTE — ED PROVIDER NOTES
"Encounter Date: 7/7/2024       History     Chief Complaint   Patient presents with    Loss of Consciousness     PT was at [employer] and collapsed. Police deny pt hit his head. Brought in by [facility].  Progress report states O2 lever 85-88% and /97     73-year-old male was suspicious at [employer].  Police picked him up and while he was trying to place in [place] he became drowsy with altered mental status.  Patient very drowsy and lethargic on arrival to ED.  Is moving all his limbs but moaning.  Airway is protected.  GCS-9  Patient seen in similar situation 2 days ago.  Had workup with CT and CTA which are negative.  Chronic history of alcoholism.    The history is provided by the patient.     Review of patient's allergies indicates:  No Known Allergies  Past Medical History:   Diagnosis Date    Gastric ulcer     Hypertension      Past Surgical History:   Procedure Laterality Date    COLONOSCOPY  3/11/14     repeat in 3 years dr montgomery     Family History   Problem Relation Name Age of Onset    Heart disease Mother      Stomach cancer Father      Kidney failure Father      Cancer Father          leukemia     Social History     Tobacco Use    Smoking status: Every Day     Current packs/day: 0.50     Types: Cigarettes     Passive exposure: Current    Smokeless tobacco: Never   Substance Use Topics    Alcohol use: Yes     Alcohol/week: 35.0 standard drinks of alcohol     Types: 35 Cans of beer per week     Comment: Pt was vague and reported "celebrating too much maybe"    Drug use: Never     Review of Systems    Physical Exam     Initial Vitals [07/07/24 1455]   BP Pulse Resp Temp SpO2   (!) 169/80 81 16 97.9 °F (36.6 °C) 97 %      MAP       --         Physical Exam    Nursing note and vitals reviewed.  Constitutional: He appears well-developed.   HENT:   Head: Normocephalic.   Right Ear: External ear normal.   Left Ear: External ear normal.   Nose: Nose normal.   Mouth/Throat: Oropharynx is clear " and moist.   Eyes: Conjunctivae and EOM are normal. Pupils are equal, round, and reactive to light.   Cardiovascular:  Normal rate, regular rhythm and normal heart sounds.           Pulmonary/Chest: Breath sounds normal. No respiratory distress. He has no wheezes. He has no rhonchi. He has no rales.   Abdominal: Abdomen is soft. Bowel sounds are normal. He exhibits no distension. There is no abdominal tenderness.     Neurological: He has normal strength. GCS eye subscore is 2. GCS verbal subscore is 2. GCS motor subscore is 5.   Altered mental status.   Skin: Skin is warm. Capillary refill takes less than 2 seconds. No rash noted. No erythema.         ED Course   Procedures  Labs Reviewed   CBC W/ AUTO DIFFERENTIAL - Abnormal; Notable for the following components:       Result Value    RBC 4.27 (*)     Hemoglobin 10.8 (*)     Hematocrit 34.2 (*)     MCV 80 (*)     MCH 25.3 (*)     MCHC 31.6 (*)     RDW 15.1 (*)     MPV 9.0 (*)     All other components within normal limits   COMPREHENSIVE METABOLIC PANEL - Abnormal; Notable for the following components:    CO2 20 (*)     BUN 38 (*)     Anion Gap 18 (*)     eGFR 53.4 (*)     All other components within normal limits   NT-PRO NATRIURETIC PEPTIDE - Abnormal; Notable for the following components:    NT-proBNP 1400 (*)     All other components within normal limits   APTT - Abnormal; Notable for the following components:    aPTT 32.3 (*)     All other components within normal limits   D DIMER, QUANTITATIVE - Abnormal; Notable for the following components:    D-Dimer 0.78 (*)     All other components within normal limits   AMMONIA - Abnormal; Notable for the following components:    Ammonia <9 (*)     All other components within normal limits   ISTAT PROCEDURE - Abnormal; Notable for the following components:    POC PH 7.282 (*)     POC PO2 30 (*)     POC HCO3 20.0 (*)     POC BE -7 (*)     POC TCO2 21 (*)     POC Hematocrit 31 (*)     All other components within normal  limits   PROTIME-INR   TROPONIN I   DRUG SCREEN PANEL, URINE EMERGENCY    Narrative:     Preferred Collection Type->Urine, Catheterized  Specimen Source->Urine   URINALYSIS, REFLEX TO URINE CULTURE    Narrative:     Preferred Collection Type->Urine, Catheterized  Specimen Source->Urine   ALCOHOL,MEDICAL (ETHANOL)   LACTIC ACID, PLASMA   FENTANYL AND METABOLITE, URINE   METHANOL          Imaging Results              CT Head Without Contrast (Final result)  Result time 07/07/24 19:06:35      Final result by Luis Waite MD (07/07/24 19:06:35)                   Impression:      Moderate maxillary sinus mucosal thickening    Atrophy and chronic white matter changes    No hemorrhage mass effect or midline shift    All CT scans   are performed using dose optimization techniques including the following: automated exposure control; adjustment of the mA and/or kV; use of iterative reconstruction technique.  Dose modulation was employed for ALARA by means of: Automated exposure control; adjustment of the mA and/or kV according to patient size (this includes techniques or standardized protocols for targeted exams where dose is matched to indication/reason for exam; i.e. extremities or head); and/or use of iterative reconstructive technique.      Electronically signed by: Luis Waite  Date:    07/07/2024  Time:    19:06               Narrative:    EXAMINATION:  CT HEAD WITHOUT CONTRAST    CLINICAL HISTORY:  Mental status change, unknown cause;    TECHNIQUE:  Low dose axial CT images obtained throughout the head without intravenous contrast. Sagittal and coronal reconstructions were performed.    COMPARISON:  None.    FINDINGS:  Atrophy and chronic white matter changes    No extra-axial blood or fluid collections.    No parenchymal mass, hemorrhage, edema or major vascular distribution infarct.    Skull/extracranial contents (limited evaluation): No fracture. Moderate maxillary sinus mucosal thickening                                        X-Ray Chest AP Portable (Final result)  Result time 07/07/24 17:31:08      Final result by South Chaudhary MD (07/07/24 17:31:08)                   Impression:      No acute abnormality.      Electronically signed by: South Chaudhary  Date:    07/07/2024  Time:    17:31               Narrative:    EXAMINATION:  XR CHEST AP PORTABLE    CLINICAL HISTORY:  ams;    TECHNIQUE:  Single frontal view of the chest was performed.    COMPARISON:  None    FINDINGS:  The lungs are clear, with normal appearance of pulmonary vasculature and no pleural effusion or pneumothorax.    The cardiac silhouette is normal in size. The hilar and mediastinal contours are unremarkable.    Bones are intact.                                       Medications   EScitalopram oxalate tablet 5 mg (has no administration in time range)   sodium chloride 0.9% flush 10 mL (has no administration in time range)   enoxaparin injection 30 mg (has no administration in time range)   lactated ringers infusion ( Intravenous New Bag 7/8/24 0235)   amLODIPine tablet 5 mg (has no administration in time range)   metoprolol tartrate (LOPRESSOR) tablet 25 mg (has no administration in time range)   thiamine (B-1) 500 mg in D5W 100 mL IVPB (0 mg Intravenous Stopped 7/8/24 0442)   folic acid tablet 1 mg (1 mg Oral Given 7/8/24 0237)   pantoprazole EC tablet 40 mg (has no administration in time range)   nicotine 21 mg/24 hr 1 patch (has no administration in time range)   sodium chloride 0.9% bolus 1,000 mL 1,000 mL (0 mLs Intravenous Stopped 7/7/24 1716)   naloxone 0.4 mg/mL injection 0.4 mg (0.4 mg Intravenous Given 7/7/24 1702)   sodium chloride 0.9% bolus 1,000 mL 1,000 mL (0 mLs Intravenous Stopped 7/7/24 2031)   dextrose 10% bolus 500 mL 500 mL (0 mLs Intravenous Stopped 7/7/24 2254)     Medical Decision Making  Differential diagnosis include not limited to- altered mental status, drug abuse, alcohol in toxication, drug intoxication, CVA,    SYNCOPE, HYPOGLYCEMIA, HYPOTENSION,     INITIATED EKG, LABS, X-RAY, CT,  URINE DRUG SCREEN NEGATIVE, URINALYSIS NORMAL, ALCOHOL NEGATIVE, TROPONIN NEGATIVE, BNP 1400, INR NORMAL, ELECTROLYTES NORMAL, ANION GAP 18, BUN 38, HEMOGLOBIN 10.8, WBC 6.8  ABG 7.28, PO2 30 OXYGEN SATURATIONS 49% VENOUS BLOOD POSSIBLE METABOLIC ACIDOSIS BASE EXCESS -7.  Unknown reason for sudden syncopal episode.  Pending CT of head.  Patient turned over to Dr. Davalos at shift change.    X-RAY NO ACUTE FINDINGS,      Amount and/or Complexity of Data Reviewed  Labs: ordered. Decision-making details documented in ED Course.  Radiology: ordered and independent interpretation performed. Decision-making details documented in ED Course.    Risk  Prescription drug management.               ED Course as of 07/08/24 0637   Sun Jul 07, 2024 1941 Page has been placed to hospitals Internal Medicine.  We are currently awaiting callback to discuss options for hospitalization.   [ST]      ED Course User Index  [ST] Jatin Davalos MD                           Clinical Impression:  Final diagnoses:  [R55] Syncope  [R41.82] Altered mental status, unspecified altered mental status type (Primary)          ED Disposition Condition    Observation Stable                Calixto Elizabeth MD  07/08/24 0637

## 2024-07-08 ENCOUNTER — CLINICAL SUPPORT (OUTPATIENT)
Dept: SMOKING CESSATION | Facility: CLINIC | Age: 74
End: 2024-07-08

## 2024-07-08 VITALS
BODY MASS INDEX: 18.96 KG/M2 | SYSTOLIC BLOOD PRESSURE: 165 MMHG | OXYGEN SATURATION: 98 % | HEART RATE: 66 BPM | WEIGHT: 118 LBS | TEMPERATURE: 98 F | RESPIRATION RATE: 18 BRPM | DIASTOLIC BLOOD PRESSURE: 74 MMHG | HEIGHT: 66 IN

## 2024-07-08 DIAGNOSIS — F17.210 CIGARETTE SMOKER: Primary | ICD-10-CM

## 2024-07-08 PROBLEM — K25.9 GASTRIC ULCER: Status: RESOLVED | Noted: 2017-07-10 | Resolved: 2024-07-08

## 2024-07-08 PROBLEM — G93.40 ACUTE ENCEPHALOPATHY: Status: RESOLVED | Noted: 2024-07-07 | Resolved: 2024-07-08

## 2024-07-08 PROBLEM — N17.9 AKI (ACUTE KIDNEY INJURY): Status: RESOLVED | Noted: 2024-07-07 | Resolved: 2024-07-08

## 2024-07-08 LAB
ANION GAP SERPL CALC-SCNC: 10 MMOL/L (ref 8–16)
APICAL FOUR CHAMBER EJECTION FRACTION: 62 %
APICAL TWO CHAMBER EJECTION FRACTION: 46 %
ASCENDING AORTA: 3.47 CM
AV INDEX (PROSTH): 0.77
AV MEAN GRADIENT: 11 MMHG
AV PEAK GRADIENT: 20 MMHG
AV REGURGITATION PRESSURE HALF TIME: 363.46 MS
AV VALVE AREA BY VELOCITY RATIO: 3.12 CM²
AV VALVE AREA: 3 CM²
AV VELOCITY RATIO: 0.81
BASOPHILS # BLD AUTO: 0.07 K/UL (ref 0–0.2)
BASOPHILS NFR BLD: 1.3 % (ref 0–1.9)
BSA FOR ECHO PROCEDURE: 1.58 M2
BUN SERPL-MCNC: 24 MG/DL (ref 8–23)
CALCIUM SERPL-MCNC: 9.1 MG/DL (ref 8.7–10.5)
CHLORIDE SERPL-SCNC: 112 MMOL/L (ref 95–110)
CO2 SERPL-SCNC: 19 MMOL/L (ref 23–29)
CREAT SERPL-MCNC: 0.8 MG/DL (ref 0.5–1.4)
CREAT UR-MCNC: 19 MG/DL (ref 23–375)
CV ECHO LV RWT: 0.5 CM
DIFFERENTIAL METHOD BLD: ABNORMAL
DOP CALC AO PEAK VEL: 2.23 M/S
DOP CALC AO VTI: 44.3 CM
DOP CALC LVOT AREA: 3.9 CM2
DOP CALC LVOT DIAMETER: 2.22 CM
DOP CALC LVOT PEAK VEL: 1.8 M/S
DOP CALC LVOT STROKE VOLUME: 132.7 CM3
DOP CALC MV VTI: 17.2 CM
DOP CALCLVOT PEAK VEL VTI: 34.3 CM
E WAVE DECELERATION TIME: 301.35 MSEC
E/A RATIO: 0.5
E/E' RATIO: 9.78 M/S
ECHO LV POSTERIOR WALL: 1.28 CM (ref 0.6–1.1)
EOSINOPHIL # BLD AUTO: 0.1 K/UL (ref 0–0.5)
EOSINOPHIL NFR BLD: 2.3 % (ref 0–8)
ERYTHROCYTE [DISTWIDTH] IN BLOOD BY AUTOMATED COUNT: 15.4 % (ref 11.5–14.5)
EST. GFR  (NO RACE VARIABLE): >60 ML/MIN/1.73 M^2
FERRITIN SERPL-MCNC: 11 NG/ML (ref 20–300)
FOLATE SERPL-MCNC: 14.5 NG/ML (ref 4–24)
FRACTIONAL SHORTENING: 30 % (ref 28–44)
GLUCOSE SERPL-MCNC: 70 MG/DL (ref 70–110)
HCT VFR BLD AUTO: 28.8 % (ref 40–54)
HGB BLD-MCNC: 9.2 G/DL (ref 14–18)
IMM GRANULOCYTES # BLD AUTO: 0.01 K/UL (ref 0–0.04)
IMM GRANULOCYTES NFR BLD AUTO: 0.2 % (ref 0–0.5)
INTERVENTRICULAR SEPTUM: 1.13 CM (ref 0.6–1.1)
IRON SERPL-MCNC: 35 UG/DL (ref 45–160)
IVC DIAMETER: 1.9 CM
LA MAJOR: 6.17 CM
LA MINOR: 5.92 CM
LA WIDTH: 3.5 CM
LEFT ATRIUM AREA SYSTOLIC (APICAL 2 CHAMBER): 21.91 CM2
LEFT ATRIUM AREA SYSTOLIC (APICAL 4 CHAMBER): 20.34 CM2
LEFT ATRIUM SIZE: 3.6 CM
LEFT ATRIUM VOLUME INDEX MOD: 37.6 ML/M2
LEFT ATRIUM VOLUME INDEX: 40.4 ML/M2
LEFT ATRIUM VOLUME MOD: 60.23 CM3
LEFT ATRIUM VOLUME: 64.71 CM3
LEFT INTERNAL DIMENSION IN SYSTOLE: 3.57 CM (ref 2.1–4)
LEFT VENTRICLE DIASTOLIC VOLUME INDEX: 78.12 ML/M2
LEFT VENTRICLE DIASTOLIC VOLUME: 124.99 ML
LEFT VENTRICLE END DIASTOLIC VOLUME APICAL 2 CHAMBER: 87.12 ML
LEFT VENTRICLE END DIASTOLIC VOLUME APICAL 4 CHAMBER: 149.32 ML
LEFT VENTRICLE END SYSTOLIC VOLUME APICAL 2 CHAMBER: 66.01 ML
LEFT VENTRICLE END SYSTOLIC VOLUME APICAL 4 CHAMBER: 54.68 ML
LEFT VENTRICLE MASS INDEX: 153 G/M2
LEFT VENTRICLE SYSTOLIC VOLUME INDEX: 33.4 ML/M2
LEFT VENTRICLE SYSTOLIC VOLUME: 53.4 ML
LEFT VENTRICULAR INTERNAL DIMENSION IN DIASTOLE: 5.12 CM (ref 3.5–6)
LEFT VENTRICULAR MASS: 244.16 G
LV LATERAL E/E' RATIO: 11 M/S
LV SEPTAL E/E' RATIO: 8.8 M/S
LVED V (TEICH): 124.99 ML
LVES V (TEICH): 53.4 ML
LVOT MG: 6.13 MMHG
LVOT MV: 1.18 CM/S
LYMPHOCYTES # BLD AUTO: 1.5 K/UL (ref 1–4.8)
LYMPHOCYTES NFR BLD: 28.5 % (ref 18–48)
MAGNESIUM SERPL-MCNC: 1.9 MG/DL (ref 1.6–2.6)
MCH RBC QN AUTO: 25.5 PG (ref 27–31)
MCHC RBC AUTO-ENTMCNC: 31.9 G/DL (ref 32–36)
MCV RBC AUTO: 80 FL (ref 82–98)
MONOCYTES # BLD AUTO: 0.6 K/UL (ref 0.3–1)
MONOCYTES NFR BLD: 11.9 % (ref 4–15)
MV PEAK A VEL: 0.88 M/S
MV PEAK E VEL: 0.44 M/S
MV PEAK GRADIENT: 4 MMHG
MV STENOSIS PRESSURE HALF TIME: 87.39 MS
MV VALVE AREA BY CONTINUITY EQUATION: 7.72 CM2
MV VALVE AREA P 1/2 METHOD: 2.52 CM2
NEUTROPHILS # BLD AUTO: 3 K/UL (ref 1.8–7.7)
NEUTROPHILS NFR BLD: 55.8 % (ref 38–73)
NRBC BLD-RTO: 0 /100 WBC
OHS LV EJECTION FRACTION SIMPSONS BIPLANE MOD: 55 %
PHOSPHATE SERPL-MCNC: 2.9 MG/DL (ref 2.7–4.5)
PISA AR MAX VEL: 5.21 M/S
PISA MRMAX VEL: 5.59 M/S
PISA TR MAX VEL: 2.14 M/S
PLATELET # BLD AUTO: 252 K/UL (ref 150–450)
PMV BLD AUTO: 8.8 FL (ref 9.2–12.9)
POCT GLUCOSE: 99 MG/DL (ref 70–110)
POTASSIUM SERPL-SCNC: 3.5 MMOL/L (ref 3.5–5.1)
PULM VEIN S/D RATIO: 1.81
PV PEAK D VEL: 0.32 M/S
PV PEAK S VEL: 0.58 M/S
RA MAJOR: 5.55 CM
RA PRESSURE ESTIMATED: 3 MMHG
RA WIDTH: 3.31 CM
RBC # BLD AUTO: 3.61 M/UL (ref 4.6–6.2)
RIGHT VENTRICLE DIASTOLIC BASEL DIMENSION: 3.4 CM
RIGHT VENTRICLE DIASTOLIC MID DIMENSION: 2.6 CM
RV TB RVSP: 5 MMHG
RV TISSUE DOPPLER FREE WALL SYSTOLIC VELOCITY 1 (APICAL 4 CHAMBER VIEW): 11.04 CM/S
SATURATED IRON: 10 % (ref 20–50)
SINUS: 4.01 CM
SODIUM SERPL-SCNC: 141 MMOL/L (ref 136–145)
SODIUM UR-SCNC: 136 MMOL/L (ref 20–250)
STJ: 3.64 CM
TDI LATERAL: 0.04 M/S
TDI SEPTAL: 0.05 M/S
TDI: 0.05 M/S
TOTAL IRON BINDING CAPACITY: 361 UG/DL (ref 250–450)
TR MAX PG: 18 MMHG
TRANSFERRIN SERPL-MCNC: 244 MG/DL (ref 200–375)
TRICUSPID ANNULAR PLANE SYSTOLIC EXCURSION: 2.79 CM
TV REST PULMONARY ARTERY PRESSURE: 21 MMHG
VIT B12 SERPL-MCNC: 455 PG/ML (ref 210–950)
WBC # BLD AUTO: 5.3 K/UL (ref 3.9–12.7)
Z-SCORE OF LEFT VENTRICULAR DIMENSION IN END DIASTOLE: 1.17
Z-SCORE OF LEFT VENTRICULAR DIMENSION IN END SYSTOLE: 1.85

## 2024-07-08 PROCEDURE — 63600175 PHARM REV CODE 636 W HCPCS

## 2024-07-08 PROCEDURE — 84100 ASSAY OF PHOSPHORUS: CPT

## 2024-07-08 PROCEDURE — 83540 ASSAY OF IRON: CPT

## 2024-07-08 PROCEDURE — 25000003 PHARM REV CODE 250

## 2024-07-08 PROCEDURE — 97530 THERAPEUTIC ACTIVITIES: CPT

## 2024-07-08 PROCEDURE — 82607 VITAMIN B-12: CPT

## 2024-07-08 PROCEDURE — 36415 COLL VENOUS BLD VENIPUNCTURE: CPT | Mod: XB

## 2024-07-08 PROCEDURE — 36415 COLL VENOUS BLD VENIPUNCTURE: CPT

## 2024-07-08 PROCEDURE — 84425 ASSAY OF VITAMIN B-1: CPT

## 2024-07-08 PROCEDURE — 97165 OT EVAL LOW COMPLEX 30 MIN: CPT

## 2024-07-08 PROCEDURE — 80048 BASIC METABOLIC PNL TOTAL CA: CPT

## 2024-07-08 PROCEDURE — 85025 COMPLETE CBC W/AUTO DIFF WBC: CPT

## 2024-07-08 PROCEDURE — 83735 ASSAY OF MAGNESIUM: CPT

## 2024-07-08 PROCEDURE — 82728 ASSAY OF FERRITIN: CPT

## 2024-07-08 PROCEDURE — 82746 ASSAY OF FOLIC ACID SERUM: CPT

## 2024-07-08 PROCEDURE — 94761 N-INVAS EAR/PLS OXIMETRY MLT: CPT

## 2024-07-08 PROCEDURE — 97535 SELF CARE MNGMENT TRAINING: CPT

## 2024-07-08 PROCEDURE — 97161 PT EVAL LOW COMPLEX 20 MIN: CPT

## 2024-07-08 PROCEDURE — G0378 HOSPITAL OBSERVATION PER HR: HCPCS

## 2024-07-08 PROCEDURE — S4991 NICOTINE PATCH NONLEGEND: HCPCS

## 2024-07-08 RX ORDER — IBUPROFEN 200 MG
1 TABLET ORAL DAILY
Status: DISCONTINUED | OUTPATIENT
Start: 2024-07-08 | End: 2024-07-08 | Stop reason: HOSPADM

## 2024-07-08 RX ORDER — FOLIC ACID 1 MG/1
1 TABLET ORAL DAILY
Status: DISCONTINUED | OUTPATIENT
Start: 2024-07-08 | End: 2024-07-08

## 2024-07-08 RX ORDER — AMLODIPINE BESYLATE 5 MG/1
5 TABLET ORAL DAILY
Status: DISCONTINUED | OUTPATIENT
Start: 2024-07-08 | End: 2024-07-08 | Stop reason: HOSPADM

## 2024-07-08 RX ORDER — PANTOPRAZOLE SODIUM 40 MG/1
40 TABLET, DELAYED RELEASE ORAL DAILY
Status: DISCONTINUED | OUTPATIENT
Start: 2024-07-08 | End: 2024-07-08 | Stop reason: HOSPADM

## 2024-07-08 RX ORDER — MAGNESIUM SULFATE HEPTAHYDRATE 40 MG/ML
2 INJECTION, SOLUTION INTRAVENOUS ONCE
Status: COMPLETED | OUTPATIENT
Start: 2024-07-08 | End: 2024-07-08

## 2024-07-08 RX ORDER — FOLIC ACID 1 MG/1
1 TABLET ORAL DAILY
Status: DISCONTINUED | OUTPATIENT
Start: 2024-07-08 | End: 2024-07-08 | Stop reason: HOSPADM

## 2024-07-08 RX ORDER — ENOXAPARIN SODIUM 100 MG/ML
40 INJECTION SUBCUTANEOUS EVERY 24 HOURS
Status: DISCONTINUED | OUTPATIENT
Start: 2024-07-08 | End: 2024-07-08 | Stop reason: HOSPADM

## 2024-07-08 RX ORDER — SODIUM CHLORIDE, SODIUM LACTATE, POTASSIUM CHLORIDE, CALCIUM CHLORIDE 600; 310; 30; 20 MG/100ML; MG/100ML; MG/100ML; MG/100ML
INJECTION, SOLUTION INTRAVENOUS CONTINUOUS
Status: DISCONTINUED | OUTPATIENT
Start: 2024-07-08 | End: 2024-07-08

## 2024-07-08 RX ORDER — ACETAMINOPHEN 325 MG/1
650 TABLET ORAL ONCE
Status: COMPLETED | OUTPATIENT
Start: 2024-07-08 | End: 2024-07-08

## 2024-07-08 RX ORDER — METOPROLOL TARTRATE 25 MG/1
25 TABLET, FILM COATED ORAL 2 TIMES DAILY
Status: DISCONTINUED | OUTPATIENT
Start: 2024-07-08 | End: 2024-07-08 | Stop reason: HOSPADM

## 2024-07-08 RX ADMIN — ESCITALOPRAM OXALATE 5 MG: 5 TABLET, FILM COATED ORAL at 08:07

## 2024-07-08 RX ADMIN — SODIUM CHLORIDE, POTASSIUM CHLORIDE, SODIUM LACTATE AND CALCIUM CHLORIDE: 600; 310; 30; 20 INJECTION, SOLUTION INTRAVENOUS at 02:07

## 2024-07-08 RX ADMIN — THIAMINE HYDROCHLORIDE 500 MG: 100 INJECTION, SOLUTION INTRAMUSCULAR; INTRAVENOUS at 04:07

## 2024-07-08 RX ADMIN — PANTOPRAZOLE SODIUM 40 MG: 40 TABLET, DELAYED RELEASE ORAL at 08:07

## 2024-07-08 RX ADMIN — MAGNESIUM SULFATE HEPTAHYDRATE 2 G: 40 INJECTION, SOLUTION INTRAVENOUS at 10:07

## 2024-07-08 RX ADMIN — THIAMINE HYDROCHLORIDE 500 MG: 100 INJECTION, SOLUTION INTRAMUSCULAR; INTRAVENOUS at 02:07

## 2024-07-08 RX ADMIN — AMLODIPINE BESYLATE 5 MG: 5 TABLET ORAL at 08:07

## 2024-07-08 RX ADMIN — FOLIC ACID 1 MG: 1 TABLET ORAL at 02:07

## 2024-07-08 RX ADMIN — THIAMINE HYDROCHLORIDE 500 MG: 100 INJECTION, SOLUTION INTRAMUSCULAR; INTRAVENOUS at 08:07

## 2024-07-08 RX ADMIN — NICOTINE 1 PATCH: 21 PATCH, EXTENDED RELEASE TRANSDERMAL at 08:07

## 2024-07-08 RX ADMIN — METOPROLOL TARTRATE 25 MG: 25 TABLET, FILM COATED ORAL at 08:07

## 2024-07-08 RX ADMIN — ACETAMINOPHEN 650 MG: 325 TABLET ORAL at 03:07

## 2024-07-08 NOTE — PROGRESS NOTES
"[facility] Medicine Progress Note    Primary Team: [facility] Hospitalist Team A  Attending Physician: Ward Mina MD  Resident: Brayan Grover DO  Intern: Rosa Chavez DO    Date of Admit: 2024     Chief Complaint: Acute Encephalopathy x1 day    Subjective/Interval Events:      No acute events overnight. Pt's ex-partner, Ortega, visited overnight and at bedside this AM. Pt returned to his baseline mental status, fully alert and oriented, but still drowsy. Tolerating a regular diet. No pain, states that he is not experiencing any pain. Pt states that he does not have a place do go from the hospital. All questions and concerns addressed.      Objective:   Last 24 Hour Vital Signs:  BP  Min: 162/72  Max: 211/98  Temp  Av.8 °F (36.6 °C)  Min: 96.9 °F (36.1 °C)  Max: 98.2 °F (36.8 °C)  Pulse  Av.4  Min: 58  Max: 96  Resp  Av.2  Min: 16  Max: 20  SpO2  Av.4 %  Min: 94 %  Max: 100 %  Height  Av' 6" (167.6 cm)  Min: 5' 6" (167.6 cm)  Max: 5' 6" (167.6 cm)  Weight  Av.2 kg (119 lb 6.6 oz)  Min: 53.9 kg (118 lb 13.3 oz)  Max: 54.4 kg (120 lb)    Intake/Output Summary (Last 24 hours) at 2024 0746  Last data filed at 2024 2155  Gross per 24 hour   Intake 1000 ml   Output 300 ml   Net 700 ml       Physical Examination:  General: pt fully alert and oriented, no acute distress  HENT: Atraumatic, normocephalic, EOMI and PERRLA, no scleral icterus, dry MM, no nasal discharge. Edentulous.   Neck: no obvious goiter or masses  CV: RRR; no murmurs, rubs, or gallops. Normal s1, s2  Resp: CTAB with normal work of breathing and chest excursion. No rhonchi, rales, or wheezing appreciated  Abd: Soft, NTND. Normoactive BS x4. No organomegaly or masses appreciated upon palpation.   MSK: +2 radial pulses b/l. No edema, cyanosis, or erythema noted on extremities.   Neuro: ANOx3. 5/5 LE and UE strength bilaterally, sensation grossly intact.  strength equal bilaterally. CNII-XII grossly intact  Skin: " superficial lacerations on left hand. Scattered ecchymosis on legs     Laboratory:  Recent Labs   Lab 07/04/24  1415 07/07/24  1531 07/07/24  1733 07/08/24  0235   WBC 5.14 6.84  --   --    HGB 10.8* 10.8*  --   --    HCT 34.7* 34.2* 31*  --     314  --   --    MCV 81* 80*  --   --    RDW 15.4* 15.1*  --   --     143  --  141   K 3.6 3.9  --  3.5    105  --  112*   CO2 22* 20*  --  19*   BUN 16 38*  --  24*   CREATININE 0.90 1.40  --  0.8   GLU 84 84  --  70   PROT 6.9 7.8  --   --    ALBUMIN 3.9 4.5  --   --    BILITOT 0.4 0.5  --   --    AST 25 33  --   --    ALKPHOS 82 83  --   --    ALT 11 16  --   --        Microbiology:  N/A    Imaging:  CT Head  without Contrast  7/7/2024  Impression:  Moderate maxillary sinus mucosal thickening  Atrophy and chronic white matter changes  No hemorrhage mass effect or midline shift     CXR  7/7/2024  Impression:  No acute abnormality.    Current Medications:     Scheduled:   amLODIPine  5 mg Oral Daily    enoxparin  30 mg Subcutaneous Daily    EScitalopram oxalate  5 mg Oral Daily    folic acid  1 mg Oral Daily    metoprolol tartrate  25 mg Oral BID    nicotine  1 patch Transdermal Daily    pantoprazole  40 mg Oral Daily    thiamine (B-1) 500 mg in D5W 100 mL IVPB  500 mg Intravenous TID         Infusions:   lactated ringers   Intravenous Continuous 100 mL/hr at 07/08/24 0235 New Bag at 07/08/24 0235        PRN:    Current Facility-Administered Medications:     sodium chloride 0.9%, 10 mL, Intravenous, PRN    Assessment & Plan:     Gama Huertas is a 73 y.o.  male with a PMH of alcohol abuse, HTN, depression, tobacco abuse, GERD, and pulmonary emphysema who was brought to [facility] ED on 7/7/24  by the police after he was found to be lethargic and altered at a local [employer]. [place] [facility] and [place] to [facility] for undifferentiated acute encephalopathy and JOE.     Undifferentiated Acute Encephalopathy, resolved  Hx of Alcohol Use  Disorder  Syncope  - Pt found altered at lethargic at [facility] by police. Pt had a possible syncopal episode while being evaluated  - Initial vitals in ED with elevated BP.  - Pt has a significant alcohol use hx (per family drinks several drinks a day). Unknown last drink. Ethanol <10 in ED.  - UDS negative  - Normal WBC. Urinalysis without signs of infection. Lactate wnl  - Glucose of 84 on CMP  - VB.2/42  - Ammonia WNL  - CT Head with chronic white matter changes but no acute abnormalities  Plan  - Ordered thiamine leval and started thiamine supplementation  - Continue folic acid supplementation  - Started mIVF, transitioned to regular diet this AM  - Continue to follow methanol and fentanyl labs  - On telemetry     JOE, resolved  AGMA, resolved  - Cr elevated to 1.4 upon admission ( baseline around 0.9), Cr of 0.8 this AM  - Upon admission: Bicarb at 20, AG at 18. This AM bicarb 19, AG of 10  - Urinalysis WNL  Plan  - Ordered urine studies, results pending     Elevated BNP  - BNP elevated to 1400 ( baseline at 875)  - No LE edema or signs of fluid overload on exam  - CXR without pulmonary edema  Plan  - No echo on file, TTE ordered      Microcytic Anemia  - Hb low at 10.8, MVC at 80 upon admission, repeat H/H: 9.2/28.8  Plan  - Iron studies, B12 and folate levels pending  - Pt is overdue for a colonoscopy    HTN  - Continue home amlodipine and lopressor ( both doses were recently decreased by PCP due to recent falls)     Depression  - Continue home lexapro     Gastric Ulcer Disease  - Continue home Protonix     Tobacco Abuse  - Pt smokes 2ppd  - Ordered nicotine patches.   - Tobacco cessation education prior to discharge     Unsteady Gait  - Ordered PT/OT to evaluate pt's needs    Diet: regular diet   Code: Full code   Dispo: pt medically stable. Pt is unhoused, need to establish safe discharge plan.     Brayan Grover DO  [facility] Internal Medicine, -II    [facility] Medicine Hospitalist Pager numbers:   U Hospitalist  Medicine Team A (Brittany/Miguelina): [phone]  [facility] Hospitalist Medicine Team B (Bayron/Leopoldo):  [phone]

## 2024-07-08 NOTE — PLAN OF CARE
Problem: Occupational Therapy  Goal: Occupational Therapy Goal  7/8/2024 1229 by Mili Little, OT  Outcome: Progressing     Pt was agreeable to and participated in OT/PT evaluation.   Pt reports that he was mod I with mobility and ADLS at [facility].  He completed his evaluation and noted to complete his mobility and ADLS at baseline.  Supervision was needed due to pt's impulsivity, but this was noted at baseline as well.  Due to these factors, pt is discharged from OT services.  No DME or post acute OT required at this time.      Mili Little OT  7/8/2024

## 2024-07-08 NOTE — PLAN OF CARE
Problem: Occupational Therapy  Goal: Occupational Therapy Goal  Description: Goals to be met by: 08/07/24     Patient will increase functional independence with ADLs by performing:    UE Dressing with Set-up Assistance.  LE Dressing with Minimal Assistance and Assistive Devices as needed.  Grooming while seated at sink with Mod I.  Toileting from bedside commode with Minimal Assistance for hygiene and clothing management.   Toilet transfer to bedside commode with Contact Guard Assistance.  Upper extremity exercise program 3x15 reps per handout, with assistance as needed.    Outcome: Progressing     Pt was agreeable to and participated in OT/PT evaluation.   Pt reports that he was mod I with mobility and ADLS at [facility].  He completed his evaluation and noted to complete his mobility and ADLS at baseline.  Supervision was needed due to pt's impulsivity, but this was noted at baseline as well.  Due to these factors, pt is discharged from OT services.  No DME or post acute OT required at this time.     Mili Little, OT  7/8/2024

## 2024-07-08 NOTE — PLAN OF CARE
Pt clear to D/C. Pt going to his relative [facility] home in [place] at [address]. Pt to be transported by [facility] van service. F/U appts have been requested. No other D/C needs identified. Pt clear from [facility] - Telemetry  Discharge Final Note    Primary Care Provider: Richard Meléndez MD    Expected Discharge Date: 7/8/2024    Final Discharge Note (most recent)       Final Note - 07/08/24 1504          Final Note    Assessment Type Discharge Planning Assessment (P)      Anticipated Discharge Disposition Home or Self Care (P)      What phone number can be called within the next 1-3 days to see how you are doing after discharge? [phone] (P)         Post-Acute Status    Discharge Delays None known at this time (P)                      Important Message from Medicare             Contact Info       Richard Meléndez MD   Specialty: Family Medicine   Relationship: PCP - General    [address] [place] [id number]   Phone: [phone]       Next Steps: Call in 1 week(s)              Maddison Green LMSW  Phone: [phone]  [facility]

## 2024-07-08 NOTE — DISCHARGE SUMMARY
[facility] Medicine Discharge Summary    Primary Team: [facility] Hospitalist Team B  Attending Physician: Ward Mina MD  Resident: Brayan Grover DO  Intern: Rosa Chavez DO    Date of Admit: 7/7/2024  Date of Discharge: 7/8/2024    Discharge to: Cousin's house  Condition: Stable     Discharge Diagnoses     Patient Active Problem List   Diagnosis    Microcytic anemia    Hypertension    Anxiety    History of colon polyps    PSA elevation    Other nonthrombocytopenic purpura    Dementia without behavioral disturbance    Smokes    Alcohol abuse    Adjustment disorder with mixed anxiety and depressed mood    Alcohol use disorder, severe, dependence    Alcohol withdrawal syndrome without complication    Disorders of glucose transport, unspecified    Aortic atherosclerosis    Pulmonary emphysema, unspecified emphysema type    Syncope    History of noncompliance with medical treatment, presenting hazards to health    Elevated brain natriuretic peptide (BNP) level       Consultants and Procedures     Consultants:  None.    Procedures:   None.    Brief History of Present Illness      HPI:   Gama Huertas is a 73 y.o.  male with a PMH of alcohol use disorder, HTN, depression, tobacco abuse, GERD, and pulmonary emphysema who was brought to [facility] ED on 7/7/24  by the police after he was found to be lethargic and altered at a local [employer]. While pt was being evaluated he collapsed, but did not hit his head. Pt was moving all extremities and protected his airway on scene. On presentation to [facility] ED pt was hypertensive to 190s/90s, satting well on room air. He remained lethargic and was only oriented to person. Pt had a CT Head and CXR which were both normal. Initial labs were concerning for an JOE on CMP. BNP was elevated to 1,400. EKG showed NSR and troponin was negative. Ethanol and UDS were also negative. Due to continued AMS and JOE, pt was transferred to [facility] and admitted to  [facility] Team B. [place] [facility] pt was alert to person, place, and time. Pt denied any recent HA, fevers, chest pain, SOB, AP, N/V, or pain with urination. Pt was unable to provide further history.      Collateral was obtained through pt's relative (Marcia) and former partner (Ortega). Pt has a chronic alcohol use disorder history, drinks several rum coolers a day. Pt is currently unhoused after breaking up with his partner 3 days ago. At his baseline pt is A/Ox3 and able to ambulate on his own, although has had several falls recently.   During his brief hospitalization course his acute kidney injury resolved with IVF resuscitation. His mental status returned to baseline. TTE performed in the setting of possible syncopal event did not yield any abnormal findings. Pt was ultimately discharged to his cousin's home.     Hospital Course By Problem with Pertinent Findings     Undifferentiated Acute Encephalopathy, resolved  Hx of Alcohol Use Disorder  - Pt found altered at lethargic at [facility] by police. Pt denied ever losing consciousness  - Pt has a significant alcohol use hx (per family drinks several drinks a day). Unknown last drink. Ethanol level <10 in ED.  - UDS negative  - Normal WBC. Urinalysis without signs of infection. Lactate wnl  - VBG: pH of 7.2, pCO2 of 42  - Ammonia wnl  - CT Head with chronic white matter changes but no acute abnormalities  - Ordered thiamine level and initiated on thiamine supplementation  - Continue folic acid supplementation     JOE, resolved  AGMA, resolved  - Cr elevated to 1.4 upon admission ( baseline around 0.9), Cr of 0.8 on  AM of discharge  - Upon admission: Bicarb at 20, AG at 18. AM of discharge bicarb 19, AG of 10  - Urinalysis wnl     Elevated BNP  - BNP elevated to 1400 ( baseline at 875)  - No LE edema or signs of fluid overload on exam  - CXR without pulmonary edema  - TTE (7/8): EF 60-65, concentric hypertrophy, normal diastolic function      Microcytic  Anemia  - Hb low at 10.8, MVC at 80 upon admission, repeat H/H: 9.2/28.8  - B12 and folate wnl  - Iron profile significant for low iron (35)  - Pt is overdue for a colonoscopy     HTN  - Continue home amlodipine and lopressor ( both doses were recently decreased by PCP due to recent falls)     Depression  - Continue home lexapro     Gastric Ulcer Disease  - Continue home Protonix     Tobacco Abuse  - Pt smokes 2ppd  - Ordered nicotine patches.   - Tobacco cessation education prior to discharge     Unsteady Gait  - PT/OT evaluated patient - no DME or post acute therapy needs indicated    Discharge Medications        Medication List        ASK your doctor about these medications      amLODIPine 10 MG tablet  Commonly known as: NORVASC  TAKE 1 TABLET BY MOUTH ONCE DAY     COSOPT 22.3-6.8 mg/mL ophthalmic solution  Generic drug: dorzolamide-timolol 2-0.5%     EScitalopram oxalate 5 MG Tab  Commonly known as: LEXAPRO     FeroSuL 325 mg (65 mg iron) Tab tablet  Generic drug: ferrous sulfate  TAKE 1 TABLET BY MOUTH ONCE A DAY     folic acid 1 MG tablet  Commonly known as: FOLVITE  Take 1 tablet (1 mg total) by mouth once daily.     gabapentin 300 MG capsule  Commonly known as: NEURONTIN  Take 1 capsule (300 mg total) by mouth 2 (two) times daily.     latanoprost 0.005 % ophthalmic solution     metoprolol tartrate 50 MG tablet  Commonly known as: LOPRESSOR  Take 1 tablet (50 mg total) by mouth 2 (two) times daily.     nicotine polacrilex 4 MG Gum  Commonly known as: NICORETTE  Take 1 each (4 mg total) by mouth as needed.     pantoprazole 40 MG tablet  Commonly known as: PROTONIX  Take 1 tablet (40 mg total) by mouth once daily.              Discharge Information:   Diet:  Regular diet    Physical Activity:  As tolerated by patient.             Instructions:  1. Take all medications as prescribed  2. Keep all follow-up appointments  3. Return to the hospital or call your primary care physicians if any worsening symptoms such  as fever, chest pain, shortness of breath, return of symptoms, or any other concerns.    Follow-Up Appointments:  Follow up with PCP    Brayan Grover DO  [facility] Internal Medicine, [facility]

## 2024-07-08 NOTE — PLAN OF CARE
Problem: Infection  Goal: Absence of Infection Signs and Symptoms  Outcome: Met     Problem: Adult Inpatient Plan of Care  Goal: Plan of Care Review  7/8/2024 1656 by Digna Ricks RN  Outcome: Met  7/8/2024 2169 by Digna Ricks, RN  Outcome: Progressing  Goal: Patient-Specific Goal (Individualized)  Outcome: Met  Goal: Absence of Hospital-Acquired Illness or Injury  Outcome: Met  Goal: Optimal Comfort and Wellbeing  Outcome: Met  Goal: Readiness for Transition of Care  Outcome: Met     Problem: Fall Injury Risk  Goal: Absence of Fall and Fall-Related Injury  Outcome: Met     Problem: Syncope  Goal: Absence of Syncopal Symptoms  Outcome: Met     Problem: Comorbidity Management  Goal: Blood Pressure in Desired Range  Outcome: Met     Problem: Confusion Acute  Goal: Optimal Cognitive Function  Outcome: Met     Problem: Acute Kidney Injury/Impairment  Goal: Fluid and Electrolyte Balance  Outcome: Met  Goal: Improved Oral Intake  Outcome: Met  Goal: Effective Renal Function  Outcome: Met     Problem: Skin Injury Risk Increased  Goal: Skin Health and Integrity  Outcome: Met

## 2024-07-08 NOTE — H&P
[facility] Medicine H&P Note     Admitting Team: [facility] Hospitalist Team B  Attending Physician: Ward Mina MD  Resident: Reilly  Intern: Scott     Date of Admit: 7/7/2024    Chief Complaint   AMS ( found altered and drowsy at [facility])    Subjective:      History of Present Illness:  Gama Huertas is a 73 y.o.  male with a PMH of alcohol abuse, HTN, depression, tobacco abuse, GERD, and pulmonary emphysema who was brought to [facility] ED on 7/7/24  by the police after he was found to be lethargic and altered at a local [facility]. While pt was being evaluated he collapsed, did not hit his head. Pt was moving all extremities and protected his airway on scene. On presentation to [facility] pt was hypertensive to 190s/90s, stating well on room air. He remained to be lethargic and was only oriented to person. Pt had a CT Head and CXR which were both normal. Initial labs were concerning for an JOE on CMP. BNP was elevated to 1400. EKG showed NSR and troponin was negative. Ethanol and UDS were also negative. Due to continued AMS and JOE pt was transferred [facility] and admitted to [facility] Team B. On presentation to [facility] pt was alert to person place, and time. Pt denied any recent HA, fevers, chest pain, SOB, AP, N/V, or pain with urination. Pt was unable to provide further history.     Collateral was obtained through pt's relative (Marcia) and former partner (Ortega). Pt has a chronic alcohol abuse history, drinks several rum coolers a day. Pt is currently homeless after breaking up with his partner 3 days ago. At his baseline pt is A/Ox3 and able to ambulate on his own, although has had several falls recently.     Past Medical History:  Alcohol Abuse without known withdrawal seizures  HTN  Depression  Tobacco Abuse  GERD  Pulmonary Emphysema  Dementia     Past Surgical History:  Past Surgical History:   Procedure Laterality Date    COLONOSCOPY  3/11/14     repeat in 3 years dr montgomery  "      Allergies:  Review of patient's allergies indicates:  No Known Allergies    Home Medications:  Amlodipine 5 mg daily  Lopressor 25 mg BID  Lexapro 5 mg daily  Iron Tablet  Folate 1 mg daily  Protonix 40 mg daily  Gabapentin 300 mg BID    Family History:  Family History   Problem Relation Name Age of Onset    Heart disease Mother      Stomach cancer Father      Kidney failure Father      Cancer Father          leukemia       Social History:  Social History     Tobacco Use    Smoking status: Every Day     Current packs/day: 0.50     Types: Cigarettes     Passive exposure: Current    Smokeless tobacco: Never   Substance Use Topics    Alcohol use: Yes     Alcohol/week: 35.0 standard drinks of alcohol     Types: 35 Cans of beer per week     Comment: Pt was vague and reported "celebrating too much maybe"    Drug use: Never       Review of Systems:  ROS is limited as pt was unable to answer all questions.  As stated in HPl other systems are reviewed and are negative.    Health Maintaince :   Primary Care Physician: Shanae Barrera    Immunizations:   TDap 2019    Flu unknown   Pna unknown    Cancer Screening:  Colonoscopy: Due (last colonoscopy 3/11/2014 - was supposed to repeat in 3 years).     Objective:   Last 24 Hour Vital Signs:  BP  Min: 169/81  Max: 211/98  Temp  Av °F (36.7 °C)  Min: 97.6 °F (36.4 °C)  Max: 98.2 °F (36.8 °C)  Pulse  Av.7  Min: 69  Max: 96  Resp  Av.7  Min: 16  Max: 19  SpO2  Av.3 %  Min: 94 %  Max: 100 %  Height  Av' 6" (167.6 cm)  Min: 5' 6" (167.6 cm)  Max: 5' 6" (167.6 cm)  Weight  Av.2 kg (119 lb 6.6 oz)  Min: 53.9 kg (118 lb 13.3 oz)  Max: 54.4 kg (120 lb)  Body mass index is 19.18 kg/m².  No intake/output data recorded.    Physical Examination:  General: drowsy but alert to person , place, and time.  HENT: Atraumatic, normocephalic, EOMI and PERRLA, no scleral icterus, dry MM, no nasal discharge  Neck: no obvious goiter or masses  CV: RRR; no murmurs, " rubs, or gallops. Normal s1, s2  Resp: CTAB with normal work of breathing and chest excursion. No rhonchi, rales, or wheezing appreciated  Abd: Soft, NTND. Normoactive BS x4. No organomegaly or masses appreciated upon palpation.   MSK: +2 radial pulses b/l. No edema, cyanosis, or erythema noted on extremities.   Neuro: ANOx3. 5/5 LE and UE strength bilaterally, sensation grossly intact.  strength equal bilaterally. CNII-XII grossly intact  Skin: superficial lacerations on left hand. Scattered ecchymosis on legs       Laboratory:  Most Recent Data:  CBC:   Lab Results   Component Value Date    WBC 6.84 07/07/2024    HGB 10.8 (L) 07/07/2024    HCT 31 (L) 07/07/2024     07/07/2024    MCV 80 (L) 07/07/2024    RDW 15.1 (H) 07/07/2024     WBC Differential: 62.6 % N, 22.1 % L, 13.3 % M, 1.2 % Eo, 0.7 % Baso,     BMP:   Lab Results   Component Value Date     07/07/2024    K 3.9 07/07/2024     07/07/2024    CO2 20 (L) 07/07/2024    BUN 38 (H) 07/07/2024    CREATININE 1.40 07/07/2024    GLU 84 07/07/2024    CALCIUM 9.4 07/07/2024    MG 1.9 07/08/2024    PHOS 2.9 07/08/2024     LFTs:   Lab Results   Component Value Date    PROT 7.8 07/07/2024    ALBUMIN 4.5 07/07/2024    BILITOT 0.5 07/07/2024    AST 33 07/07/2024    ALKPHOS 83 07/07/2024    ALT 16 07/07/2024     Coags:   Lab Results   Component Value Date    INR 1.1 07/07/2024     FLP:   Lab Results   Component Value Date    CHOL 139 07/04/2024    HDL 46 07/04/2024    LDLCALC 80.2 07/04/2024    TRIG 64 07/04/2024    CHOLHDL 33.1 07/04/2024     DM:   Lab Results   Component Value Date    LDLCALC 80.2 07/04/2024    CREATININE 1.40 07/07/2024     Thyroid:   Lab Results   Component Value Date    TSH 0.984 07/04/2024     Anemia:   Lab Results   Component Value Date    IRON 25 (L) 08/25/2016    FERRITIN 4 (L) 08/25/2016    NYSZWKSR36 596 05/14/2015    FOLATE 8.9 05/14/2015     Cardiac:   Lab Results   Component Value Date    TROPONINI 0.013 07/07/2024      Urinalysis:   Lab Results   Component Value Date    COLORU Yellow 07/07/2024    SPECGRAV 1.015 07/07/2024    NITRITE Negative 07/07/2024    KETONESU Negative 07/07/2024    UROBILINOGEN Negative 07/07/2024    WBCUA 1 11/03/2020       Trended Lab Data:  Recent Labs   Lab 07/04/24  1415 07/07/24  1531 07/07/24  1733   WBC 5.14 6.84  --    HGB 10.8* 10.8*  --    HCT 34.7* 34.2* 31*    314  --    MCV 81* 80*  --    RDW 15.4* 15.1*  --     143  --    K 3.6 3.9  --     105  --    CO2 22* 20*  --    BUN 16 38*  --    CREATININE 0.90 1.40  --    GLU 84 84  --    PROT 6.9 7.8  --    ALBUMIN 3.9 4.5  --    BILITOT 0.4 0.5  --    AST 25 33  --    ALKPHOS 82 83  --    ALT 11 16  --        Trended Cardiac Data:  Recent Labs   Lab 07/07/24  1531   TROPONINI 0.013       Microbiology Data:  none    Other Results:  EKG (my interpretation): NSR at a rate of 80. No acute ST changes.     Radiology:  CT Head  without Contrast  7/7/2024  Impression:  Moderate maxillary sinus mucosal thickening  Atrophy and chronic white matter changes  No hemorrhage mass effect or midline shift    CXR  7/7/2024  Impression:  No acute abnormality.     Assessment:     Gama Huertas is a 73 y.o.  male with a PMH of alcohol abuse, HTN, depression, tobacco abuse, GERD, and pulmonary emphysema who was brought to [facility] ED on 7/7/24  by the police after he was found to be lethargic and altered at a local [facility]. Pt was transferred to [facility] and admitted to [facility] Team B for undifferentiated acute encephalopathy and JOE.        Plan:     Undifferentiated Acute Encephalopathy  Hx of Alcohol Abuse  Syncope  - Pt found altered at lethargic at [facility] by police. Pt had a possible syncopal episode while being evaluated  - Initial vitals in ED with elevated BP, normal HR.  - Pt has a heavy alcohol drinking history ( per family drinks several drinks a day). Unknown last drink. Ethanol <10 in ED.  - UDS negative  - Normal WBC on CBC.  Urinalysis without signs of infection  - Glucose of 84 on CMP  - VB.2/42/30/20  - LA WNL  - Ammonia WNL  - EKG with NSR, troponin: negative  - CT Head with chronic white matter changes but no acute abnormalities  Plan  - Ordered thiamine leval and started thiamine supplementation  - Started FA supplementation  - Started mIVF, can consider transitioning off once pt is eating  - Continue to follow methanol and fentanyl labs  - On telemetry    JOE  AGMA  - Crt elevated to 1.4 ( baseline around 0.9)  - Bicarb at 20, AG at 18  - Urinalysis WNL  Plan  - Ordered urine studies, will calculate FeNA  - started mIVF    Elevated BNP  - BNP elevated to 1400 ( baseline at 875)  - No LE edema or signs of fluid overload on exam  - CXR without pulmonary edema  Plan  - No echo on file, ordered TTE    Microcytic Anemia  - Hb low at 10.8, MVC at 80  Plan  - Ordered iron studies, B12 and folate levels  - Pt is overdue for a colonoscopy    HTN  - Continue home amlodipine and lopressor ( both doses were recently decreased by PCP due to recent falls)    Depression  - Continue home lexapro    Gastric Ulcer Disease  - Continue home Protonix    Tobacco Abuse  - Pt smokes 2ppd  - Ordered nicotine patches.     Unsteady Gait  - Ordered PT/OT    DVT: Lovenox  Diet: Regular pending bedside swallow  Dispo: resolution of acute encephalopathy     Code Status:     Full    Tiffany Gaona MD  [facility] - Pediatrics HO-III    [facility] Hospitalist Pager numbers:   [facility] Hospitalist Medicine Team A (Brittany/Miguelina): [phone]  [facility] Hospitalist Medicine Team B (Bayron/Leopoldo):  [phone]

## 2024-07-08 NOTE — PLAN OF CARE
Problem: Adult Inpatient Plan of Care  Goal: Plan of Care Review  7/8/2024 0321 by Zuri Antunez RN  Outcome: Progressing      VIRTUAL NURSE: Pt arrived to unit. Permission received per patient to turn camera to view patient. VIP model explained; patient informed this VN will be working with bedside nurse and the rest of the care team. Plan of care reviewed with patient.  Educated patient on VTE, fall risk and fall risk precautions in place. Call light within reach, side rails up x2. Admission questions completed. Patient instucted to ask staff for assistance. Patient verbalized complete understanding. Patient denies complaints or any needs at this time. Will continue to be available and intervene as needed.    Labs, notes, orders, and careplan reviewed.

## 2024-07-08 NOTE — PROGRESS NOTES
VN cued into room to review AVS with patient. Patient is sound asleep and volume on monitor went out while nurse trying to adjust monitor. Attempted to call room phone with nurse at bedside, and room phone is not working. Will defer AVS to bedside nurse.

## 2024-07-08 NOTE — NURSING
"RAPID RESPONSE NURSE PROACTIVE ROUNDING NOTE     Time of Visit:     Admit Date: 2024  LOS: 0  Code Status: Full Code   Date of Visit: 2024  : 1950  Age: 73 y.o.  Sex: male  Race: Black or   Bed: K464/K464 A:   MRN: [id number]  Was the patient discharged from an ICU this admission? No   Was the patient discharged from a PACU within last 24 hours?  No  Did the patient receive conscious sedation/general anesthesia in last 24 hours?  No  Was the patient in the ED within the past 24 hours?  Yes  Was the patient started on NIPPV within the past 24 hours?  No  Attending Physician: Ward Mina MD  Primary Service: Networked reference to record PCT     ASSESSMENT     Notified by charge RN via phone call.  Reason for alert: Pt hard to arouse    Diagnosis: Acute encephalopathy    Abnormal Vital Signs: BP (!) 169/81 (BP Location: Left arm, Patient Position: Lying)   Pulse 82   Temp 97.6 °F (36.4 °C) (Oral)   Resp 18   Ht 5' 6" (1.676 m)   Wt 53.9 kg (118 lb 13.3 oz)   SpO2 98%   BMI 19.18 kg/m²      Clinical Issues: Neuro    Patient  has a past medical history of Gastric ulcer and Hypertension.      Pt arrived to unit. Charge RN and bedside RN called due to pt having facial drooping and being hard to arouse. Pt does have hx of stroke. Upon entering room attempted to wake pt with verbal stimuli with little success. Pt opened eyes and yelped when pinched. Pt now more alert and talking. Pt AAOx3. Obtained glucose, BG 67.      INTERVENTIONS/ RECOMMENDATIONS     Give D10 bolus and continue to monitor BG  Monitor mental status, if change consider head CT  Perform stroke scale to obtain baseline score    Discussed plan of care with RNAngelique and Charge RN Chu.    PHYSICIAN ESCALATION     Yes/No  Yes    Orders received and case discussed with Dr. Tiffany Gaona .    Disposition: Remain in room 464.    FOLLOW-UP     Call back the Rapid Response Nurse, Ruddy Garcia RN at [phone] for " additional questions or concerns.

## 2024-07-08 NOTE — PT/OT/SLP EVAL
"Occupational Therapy   Evaluation and Discharge Note    Name: Gama Huertas  MRN: [id number]  Admitting Diagnosis: Acute encephalopathy  Recent Surgery: * No surgery found *      Recommendations:     Discharge Recommendations: No Therapy Indicated  Discharge Equipment Recommendations: none  Barriers to discharge:  Inaccessible home environment (unsure of living situation at discharge - homeless at time of admission)    Assessment:     Gama Huertas is a 73 y.o. male with a medical diagnosis of Acute encephalopathy. Pt was agreeable to and participated in OT/PT evaluation.   Pt reports that he was mod I with mobility and ADLS at [facility].  He completed his evaluation and noted to complete his mobility and ADLS at baseline.  Supervision was needed due to pt's impulsivity, but this was noted at baseline as well.  Due to these factors, pt is discharged from OT services.  No DME or post acute OT required at this time.     Plan:     During this hospitalization, patient does not require further acute OT services.  Please re-consult if situation changes.    Plan of Care Reviewed with: patient, friend    Subjective     Chief Complaint: none given  Patient/Family Comments/goals: none given    Occupational Profile:  Living Environment: unknown living situation - was reportedly homeless for past 3 days after "breaking up" with boyfriend - unknown where he will be living at discharge  Previous level of function: Mod I with mobility and ADLS    Equipment Used at home: none  Assistance upon Discharge: unknown     Pain/Comfort:  Pain Rating 1: 0/10  Pain Rating Post-Intervention 1: 0/10    Patients cultural, spiritual, Hindu conflicts given the current situation: no    Objective:     Communicated with: nurse prior to session.  Patient found HOB elevated with bed alarm, Condom Catheter, peripheral IV, telemetry upon OT entry to room.    General Precautions: Standard, fall  Orthopedic Precautions: N/A  Braces: N/A  Respiratory " Status: Room air     Occupational Performance:    Bed Mobility:    Patient completed Scooting/Bridging with modified independence  Patient completed Supine to Sit with modified independence  Patient completed Sit to Supine with modified independence    Functional Mobility/Transfers:  Patient completed Sit <> Stand Transfer with supervision  with  no assistive device   Functional Mobility: pt able to walk within room and in hallway with no AD and PT supervision only - refer to PT eval for     Activities of Daily Living:  Feeding:  independence    Grooming: independence - sitting EOB to wash face with cloth  Upper Body Dressing: independence - to [facility] gow  Lower Body Dressing: modified independence to augie socks sitting EOB    Cognitive/Visual Perceptual:  Cognitive/Psychosocial Skills:     -       Oriented to: Person, Place, Time, and Situation   -       Follows Commands/attention:Easily distracted and Follows one-step commands  -       Communication: clear/fluent  -       Memory: No Deficits noted  -       Safety awareness/insight to disability: impaired   -       Mood/Affect/Coping skills/emotional control: Appropriate to situation    Physical Exam:  Balance: -       sitting = good; standing = CGA with RW  Postural examination/scapula alignment:    -       Rounded shoulders  Skin integrity: Visible skin intact  Edema:  None noted  Sensation: -       Impaired  light/touch BLEs  Upper Extremity Range of Motion:   BUEs = WFL  Upper Extremity Strength:  BUEs = WFL   Strength:  BUEs = WFL    AMPAC 6 Click ADL:  AMPAC Total Score: 24    Treatment & Education:  Pt completed ADLs and func mobility activities for tx session as noted above  Pt educated on role of OT and POC      Patient left  in wheelchair with transport to go to testing      GOALS:   Multidisciplinary Problems       Occupational Therapy Goals          Problem: Occupational Therapy    Goal Priority Disciplines Outcome Interventions    Occupational Therapy Goal     OT, PT/OT Progressing    Description: Goals to be met by: 08/07/24     Patient will increase functional independence with ADLs by performing:    UE Dressing with Set-up Assistance.  LE Dressing with Minimal Assistance and Assistive Devices as needed.  Grooming while seated at sink with Mod I.  Toileting from bedside commode with Minimal Assistance for hygiene and clothing management.   Toilet transfer to bedside commode with Contact Guard Assistance.  Upper extremity exercise program 3x15 reps per handout, with assistance as needed.                         History:     Past Medical History:   Diagnosis Date    Gastric ulcer     Hypertension          Past Surgical History:   Procedure Laterality Date    COLONOSCOPY  3/11/14     repeat in 3 years dr montgomery       Time Tracking:     OT Date of Treatment: 07/08/24  OT Start Time: 0928  OT Stop Time: 0950  OT Total Time (min): 22 min coeval with PT    Billable Minutes:Evaluation 10  Self Care/Home Management 10    7/8/2024

## 2024-07-08 NOTE — PLAN OF CARE
Problem: Physical Therapy  Goal: Physical Therapy Goal  Outcome: Met     PT Eval completed, note to follow. Pt ambulated in room and in hallway with Supervision and no AD. Mild sway and impulsive movement noted. Pt appears to be near/at baseline. Pt reports his gait is at baseline but feels like he needs to take his time due to increased time spent in bed. No DME or post acute therapy needs identified. D/c PT.

## 2024-07-08 NOTE — PLAN OF CARE
Problem: Adult Inpatient Plan of Care  Goal: Plan of Care Review  Outcome: Progressing   Initiated care plan.  Bedside nurse state patient to sleepy to complete admission questions. Will complete when awake.

## 2024-07-08 NOTE — NURSING
Pt arrived on the unit on a stretcher. Transferred to the bed with assistance. VS per flowsheet. RA. Arouses to voice, oriented X4. Skin intact. Oriented to the room and call light. Bed in the lowest position, wheels locked. Cardiac monitor on. Bed alarm set. Instructed to call for assistance. Verbalized understanding. Family at the bedside.

## 2024-07-08 NOTE — ED NOTES
Review of patient's allergies indicates:  No Known Allergies     Patient has verified the spelling of the name and  on armband.   APPEARANCE: Patient is alert, calm, oriented x 4, and does not appear distressed.  SKIN: Skin is normal for race, warm, and dry. Normal skin turgor and mucous membranes moist.  CARDIAC: Normal rate and rhythm, no murmur heard.   RESPIRATORY:Normal rate and effort. Breath sounds clear bilaterally throughout chest. Respirations are equal and unlabored.    GASTRO: Bowel sounds normal, abdomen is soft, no tenderness, and no abdominal distention.  MUSCLE: Full ROM. No bony tenderness or soft tissue tenderness. No obvious deformity.  PERIPHERAL VASCULAR: peripheral pulses present. Normal cap refill. No edema. Warm to touch.  MENTAL STATUS: opens eyes to verbal and tactile stimuli. Trying to sit up in bed and argumentative when trying to take his BP. Aware of surrounding and that he is at a hospital but does not remember what happened to bring him here. Easily falls back asleep when no one is speaking with him.  EYE: No overt deficits noted. No drainage. Sclera WNL  ENT: EARS: no obvious drainage. NOSE: no active bleeding. THROAT: no redness or swelling.  : Voids without complication

## 2024-07-08 NOTE — PT/OT/SLP EVAL
"Physical Therapy Evaluation and Discharge Note    Patient Name:  Gama Huertas   MRN:  [id number]    Recommendations:     Discharge Recommendations: No Therapy Indicated  Discharge Equipment Recommendations: none   Barriers to discharge: Inaccessible home environment (unsure of living situation at discharge - homeless at time of admission)     Assessment:     Gama Huertas is a 73 y.o. male admitted with a medical diagnosis of Acute encephalopathy. .  At this time, patient is functioning at their prior level of function and does not require further acute PT services.      Pt ambulated in room and in hallway with Supervision and no AD. Mild sway and impulsive movement noted. Pt appears to be near/at baseline. Pt reports his gait is at baseline but feels like he needs to take his time due to increased time spent in bed. No DME or post acute therapy needs identified. D/c PT.    Recent Surgery: * No surgery found *      Plan:     During this hospitalization, patient does not require further acute PT services.  Please re-consult if situation changes.      Subjective     Chief Complaint: none  Patient/Family Comments/goals: none  Pain/Comfort:  Pain Rating 1: 0/10  Pain Rating Post-Intervention 1: 0/10    Patients cultural, spiritual, Rastafari conflicts given the current situation: no    Living Environment:  unknown living situation - was reportedly homeless for past 3 days after "breaking up" with boyfriend - unknown where he will be living at discharge   Prior to admission, patients level of function was Mod I with mobility and ADL's; pt reports he "miscoordinates/miscalculates" sometimes and has had one or two falls but that he does not fall often. Pt is a questionable hx. Equipment used at home: none.  DME owned (not currently used): none.  Upon discharge, patient will have assistance from unknown.    Objective:     Communicated with nsg prior to session.  Patient found HOB elevated with bed alarm, Condom " Catheter, peripheral IV, telemetry upon PT entry to room.    General Precautions: Standard, fall    Orthopedic Precautions:N/A   Braces: N/A  Respiratory Status: Room air    Exams:  Cognitive Exam:  Patient is oriented to Person, Place, Time, Situation, and easily distracted, follows one step commands, decreased safety awareness and insight  Postural Exam:  Patient presented with the following abnormalities:    -       Rounded shoulders  Sensation:    -       Impaired  light/touch BLE, pt reports decreased sensation  RLE ROM: WFL  RLE Strength: WFL  LLE ROM: WFL  LLE Strength: WFL    Functional Mobility:  Bed Mobility:     Scooting: modified independence  Supine to Sit: modified independence  Sit to Supine: modified independence  Transfers:     Sit to Stand:  supervision with no AD  Gait: Pt ambulated ~40 ft with supervision and no AD; Mild sway and impulsive movement noted; pt reports gait is at baseline      AM-PAC 6 CLICK MOBILITY  Total Score:20       Treatment and Education:  Educated pt on role of PT/POC and pt agreeable to participate  Facilitated bed mobility and multiple sit <> stands with supervision and no AD  Good standing balance; assisted with donning new brief and gown in standing due to condom catheter off and pt soiled in urine  Pt able to perform hygiene in standing   Pt ambulated in room and in hallway to transfer to transport chair for testing  Chair t/f with Supervision      AM-PAC 6 CLICK MOBILITY  Total Score:20     Patient left  in wheelchair with transport to go for testing  with all lines intact and nsg notified.    GOALS:   Multidisciplinary Problems       Physical Therapy Goals       Not on file              Multidisciplinary Problems (Resolved)          Problem: Physical Therapy    Goal Priority Disciplines Outcome Goal Variances Interventions   Physical Therapy Goal   (Resolved)     PT, PT/OT Met                         History:     Past Medical History:   Diagnosis Date    Gastric  ulcer     Hypertension        Past Surgical History:   Procedure Laterality Date    COLONOSCOPY  3/11/14     repeat in 3 years dr montgomery       Time Tracking:     PT Received On: 07/08/24  PT Start Time: 0928     PT Stop Time: 0951  PT Total Time (min): 23 min     Billable Minutes: Evaluation 10 and Therapeutic Activity 13      07/08/2024

## 2024-07-08 NOTE — ED PROVIDER NOTES
"The patient was received from the off-going emergency room physician Dr. Elizabeth at 6:00 PM.  All pertinent details presently available from the patient encounter were discussed along with the expected plan for disposition.      Vitals:    07/07/24 1455 07/07/24 1932 07/07/24 1952 07/07/24 1957   BP: (!) 169/80 (!) 211/98 (!) 180/82 (!) 180/82   Pulse: 81 96 73 73   Resp: 16      Temp: 97.9 °F (36.6 °C)   98.2 °F (36.8 °C)   TempSrc: Oral   Oral   SpO2: 97% 100% 97% 96%   Weight: 54.4 kg (120 lb)      Height:        07/07/24 2003 07/07/24 2214 07/07/24 2307 07/08/24 0351   BP: (!) 177/85 (!) 174/84 (!) 169/81 (!) 163/71   Pulse: 69 84 82 70   Resp:  19 18 20   Temp:  98.2 °F (36.8 °C) 97.6 °F (36.4 °C) 97.8 °F (36.6 °C)   TempSrc:  Oral Oral Oral   SpO2: (!) 94% 99% 98% 99%   Weight:  53.9 kg (118 lb 13.3 oz)     Height:  5' 6" (1.676 m)      07/08/24 0400 07/08/24 0746 07/08/24 0747   BP:  (!) 162/72    Pulse: 68 (!) 58 63   Resp:  18 17   Temp:  96.9 °F (36.1 °C)    TempSrc:  Axillary    SpO2:  97% 97%   Weight:      Height:        Labs Reviewed   CBC W/ AUTO DIFFERENTIAL - Abnormal; Notable for the following components:       Result Value    RBC 4.27 (*)     Hemoglobin 10.8 (*)     Hematocrit 34.2 (*)     MCV 80 (*)     MCH 25.3 (*)     MCHC 31.6 (*)     RDW 15.1 (*)     MPV 9.0 (*)     All other components within normal limits   COMPREHENSIVE METABOLIC PANEL - Abnormal; Notable for the following components:    CO2 20 (*)     BUN 38 (*)     Anion Gap 18 (*)     eGFR 53.4 (*)     All other components within normal limits   NT-PRO NATRIURETIC PEPTIDE - Abnormal; Notable for the following components:    NT-proBNP 1400 (*)     All other components within normal limits   APTT - Abnormal; Notable for the following components:    aPTT 32.3 (*)     All other components within normal limits   D DIMER, QUANTITATIVE - Abnormal; Notable for the following components:    D-Dimer 0.78 (*)     All other components within " normal limits   AMMONIA - Abnormal; Notable for the following components:    Ammonia <9 (*)     All other components within normal limits   ISTAT PROCEDURE - Abnormal; Notable for the following components:    POC PH 7.282 (*)     POC PO2 30 (*)     POC HCO3 20.0 (*)     POC BE -7 (*)     POC TCO2 21 (*)     POC Hematocrit 31 (*)     All other components within normal limits   PROTIME-INR   TROPONIN I   DRUG SCREEN PANEL, URINE EMERGENCY    Narrative:     Preferred Collection Type->Urine, Catheterized  Specimen Source->Urine   URINALYSIS, REFLEX TO URINE CULTURE    Narrative:     Preferred Collection Type->Urine, Catheterized  Specimen Source->Urine   ALCOHOL,MEDICAL (ETHANOL)   LACTIC ACID, PLASMA   FENTANYL AND METABOLITE, URINE   METHANOL           All available results from the labs ordered were independently reviewed. with findings as follows:  CBC notable for mild anemia with a hematocrit of 34.  Chemistry notable for elevated BUN of 38 and anion gap of 18.  Natriuretic peptide elevated at 1400.  D-dimer mildly elevated at 0.78.  Ammonia level normal.  Venous blood gas notable for acidosis with pH of 7.282 and base deficit of -7.  Toxicology screen and urinalysis unremarkable.  Troponin level unremarkable.  Ethanol and fentanyl levels sent out.       CT Head Without Contrast   Final Result      Moderate maxillary sinus mucosal thickening      Atrophy and chronic white matter changes      No hemorrhage mass effect or midline shift      All CT scans   are performed using dose optimization techniques including the following: automated exposure control; adjustment of the mA and/or kV; use of iterative reconstruction technique.  Dose modulation was employed for ALARA by means of: Automated exposure control; adjustment of the mA and/or kV according to patient size (this includes techniques or standardized protocols for targeted exams where dose is matched to indication/reason for exam; i.e. extremities or head);  and/or use of iterative reconstructive technique.         Electronically signed by: Luis Waite   Date:    07/07/2024   Time:    19:06      X-Ray Chest AP Portable   Final Result      No acute abnormality.         Electronically signed by: [place] Chaud[place]ary   Date:    07/07/2024   Time:    17:31           Imaging independently reviewed. with findings as follows:  CT of the head shows no evidence of skull fracture, acute hemorrhage, or apparent stroke          ED Course as of 07/08/24 0858   Sun Jul 07, 2024   1941 Page has been placed to [facility].  We are currently awaiting callback to discuss options for hospitalization.   [ST]      ED Course User Index  [ST] Jatin Davalos MD       All available findings were reviewed with the patient/family in detail along with the indications for hospitalization in order to receive cardiac monitoring and neuro monitoring along with neurology consultation and consideration for MRI and echo .  All remaining questions and concerns were addressed at this time and the patient/family communicates understanding and agrees to proceed accordingly.  Similarly, all pertinent details of the encounter were discussed with resident Dr Grover who agrees to receive the patient at [facility]  on behalf of Dr. Mina for further care as outlined above.  The patient will be transferred by EMS secondary to a need for ongoing cardiac monitoring en route.    Critical care services were delivered over the course of 50 minutes owing to acquisition of history, repeated assessment, ordering and interpretation of labs and EKG as well as imaging, and establishing arrangements for admission.  Please note that critical care services were transitioned from the prior provider in my specialty into my care.    CLINICAL IMPRESSION    ICD-10-CM ICD-9-CM   1. Altered mental status, unspecified altered mental status type  R41.82 780.97   2. Syncope  R55 780.2          ED Disposition Condition     Observation Stable             Jatin Davalos MD  07/08/24 0901

## 2024-07-08 NOTE — PROGRESS NOTES
No reported nicotine withdrawal symptoms. Will follow up for additional smoking cessation education when patient condition improves.

## 2024-07-08 NOTE — PLAN OF CARE
DENISE met with Pt and Pt friend Radha Guajardo [phone] at bedside. Address on facesheet belongs to Radha. Pt independent with ADL's. Pt reports no need for HHS or DME. Pt not a dialysis or Coumadin Pt. Pt currently homeless. Due to Pt alcoholism and behavior Pt lost his low income apartment a [place].Pt was also kicked out of group home for the same reason. Pt unable to go to NH due to not needing NH level of care. Pt unable to go back to Mr. Garcia home. Pt not agreeable to shelter placement or another group home. Pt does receives a monthly income. Pt wanting to get a ride to [place]. Pt explained would need an address in order for a transportation to take him to [place]. DENISE provided with 2 numbers for Pt cousin Radha  and Lamar [phone]. DENISE spoke with Zuly and per her Pt couldn't come to her home due to his alcoholism and Pt has been kicked out of several places due to this and refuses to get help.DENISE spoke with Pt cousin Lamar who asked could Pt go to treatment center and SW explained Pt would have to agree to go to treatment center. Pt asked to speak with cousin. Pt explained that all he needed was and address to come to and he will figure the rest out. Lamar agreed for Pt to come to his [id number] in [place]. Lamar stated he would rather Pt come to him in [place] where family could etta after Pt versus going to [place] to any shelters  Pt and Pt cousin in agreement to D/C plan. Pt will require  transport  at D/C. No other needs identified. SW to request f/u as needed. SW to assist with any future needs.     No future appointments.    [facility] - Telemetry  Discharge Assessment    Primary Care Provider: Richard Meléndez MD     Discharge Assessment (most recent)       BRIEF DISCHARGE ASSESSMENT - 07/08/24 6883          Discharge Planning    Assessment Type Discharge Planning Brief Assessment (P)      Resource/Environmental Concerns none (P)      Support Systems Family  members (P)      Equipment Currently Used at Home none (P)      Current Living Arrangements homeless (P)      Patient/Family Anticipates Transition to family members' home (P)      Patient/Family Anticipated Services at Transition none (P)      DME Needed Upon Discharge  none (P)      Discharge Plan A -- (P)    family member home    Discharge Plan B -- (P)    family member home       Physical Activity    On average, how many days per week do you engage in moderate to strenuous exercise (like a brisk walk)? 0 days (P)      On average, how many minutes do you engage in exercise at this level? 0 min (P)         Financial Resource Strain    How hard is it for you to pay for the very basics like food, housing, medical care, and heating? Hard (P)         Housing Stability    In the last 12 months, was there a time when you were not able to pay the mortgage or rent on time? Yes (P)      At any time in the past 12 months, were you homeless or living in a shelter (including now)? Yes (P)         Transportation Needs    Has the lack of transportation kept you from medical appointments, meetings, work or from getting things needed for daily living? Yes, it has kept me from non-medical meetings, appointments, work or from getting things that I need. (P)         Food Insecurity    Within the past 12 months, you worried that your food would run out before you got the money to buy more. Never true (P)      Within the past 12 months, the food you bought just didn't last and you didn't have money to get more. Never true (P)         Alcohol Use    Q1: How often do you have a drink containing alcohol? 4 or more times a week (P)      Q2: How many drinks containing alcohol do you have on a typical day when you are drinking? 10 or more (P)      Q3: How often do you have six or more drinks on one occasion? Daily or almost daily (P)         Health Literacy    How often do you need to have someone help you when you read instructions,  pamphlets, or other written material from your doctor or pharmacy? Never (P)                          Maddison Green LMSW  Phone: [phone]  [facility]

## 2024-07-10 LAB
OHS QRS DURATION: 80 MS
OHS QTC CALCULATION: 431 MS

## 2024-07-12 ENCOUNTER — TELEPHONE (OUTPATIENT)
Dept: FAMILY MEDICINE | Facility: CLINIC | Age: 74
End: 2024-07-12
Payer: MEDICARE

## 2024-07-12 DIAGNOSIS — R97.20 PSA ELEVATION: ICD-10-CM

## 2024-07-12 DIAGNOSIS — D50.9 MICROCYTIC ANEMIA: ICD-10-CM

## 2024-07-12 DIAGNOSIS — K21.9 GASTROESOPHAGEAL REFLUX DISEASE: ICD-10-CM

## 2024-07-12 DIAGNOSIS — K25.4 CHRONIC GASTRIC ULCER WITH HEMORRHAGE: ICD-10-CM

## 2024-07-12 DIAGNOSIS — Z00.00 WELLNESS EXAMINATION: ICD-10-CM

## 2024-07-12 DIAGNOSIS — I10 ESSENTIAL HYPERTENSION: ICD-10-CM

## 2024-07-12 DIAGNOSIS — Z86.010 HISTORY OF COLON POLYPS: ICD-10-CM

## 2024-07-12 DIAGNOSIS — D50.0 IRON DEFICIENCY ANEMIA DUE TO CHRONIC BLOOD LOSS: ICD-10-CM

## 2024-07-12 DIAGNOSIS — R63.4 WEIGHT LOSS: ICD-10-CM

## 2024-07-12 LAB
FENTANYL UR CFM-MCNC: NOT DETECTED NG/ML
METHANOL SERPL-MCNC: <5 MG/DL
NORFENTANYL UR CFM-MCNC: NOT DETECTED NG/ML
TOXICOLOGIST REVIEW: NORMAL
VIT B1 BLD-MCNC: 117 UG/L (ref 38–122)

## 2024-07-12 NOTE — TELEPHONE ENCOUNTER
----- Message from Lisa Voss sent at 7/12/2024  2:52 PM CDT -----  Needs advice from nurse:      Who Called:friend-Radha Guajardo'  Regarding:states pt needs help/caller is no longer living with the patient/no family, states pt is just roaming around [place], no place to go,states he needs help putting the patient in [place]  Would the patient rather a call back or [facility]?  Best Call Back number:  or   Additional Info:

## 2024-07-12 NOTE — TELEPHONE ENCOUNTER
Care Due:                  Date            Visit Type   Department     Provider  --------------------------------------------------------------------------------                                EP -                              PRIMARY      [facility] FAMILY  Last Visit: 08-      CARE ([facility])   MEDICINE       Richard Meléndez  Next Visit: None Scheduled  None         None Found                                                            Last  Test          Frequency    Reason                     Performed    Due Date  --------------------------------------------------------------------------------    Office Visit  12 months..  folic, nicotine..........  08- 08-    [facility] Embedded Care Due Messages. Reference number: [id number].   7/12/2024 12:03:10 PM CDT

## 2024-07-13 RX ORDER — ESCITALOPRAM OXALATE 5 MG/1
5 TABLET ORAL
Qty: 90 TABLET | Refills: 0 | Status: SHIPPED | OUTPATIENT
Start: 2024-07-13

## 2024-07-13 RX ORDER — AMLODIPINE BESYLATE 10 MG/1
TABLET ORAL
Qty: 90 TABLET | Refills: 3 | Status: SHIPPED | OUTPATIENT
Start: 2024-07-13

## 2024-07-15 ENCOUNTER — PATIENT OUTREACH (OUTPATIENT)
Dept: ADMINISTRATIVE | Facility: CLINIC | Age: 74
End: 2024-07-15
Payer: MEDICARE

## 2024-07-15 ENCOUNTER — TELEPHONE (OUTPATIENT)
Dept: FAMILY MEDICINE | Facility: CLINIC | Age: 74
End: 2024-07-15
Payer: MEDICARE

## 2024-07-15 NOTE — PROGRESS NOTES
C3 nurse attempted to contact Gama Huertas for a TCC post hospital discharge follow up call. A female answered the phone, and gave the phone to the patient who then hung up the phone. The patient does not have a scheduled HOSFU appointment. Message sent to PCP staff for assistance with scheduling visit with patient.

## 2024-07-15 NOTE — PROGRESS NOTES
C3 nurse attempted to contact Gama Huertas for a TCC post hospital discharge follow up call. No answer. Left voicemail with callback information. The patient does not have a scheduled HOSFU appointment. Message previously sent to PCP staff for assistance with scheduling visit with patient, and they have attempted to contact the patient for scheduling.

## 2024-07-15 NOTE — TELEPHONE ENCOUNTER
Mili Berger, LPN   to Rika Ramos Staff   MT      7/15/24 10:04 AM  Please forward this important TCC information to your provider in order to maximize the post discharge care delivery of this patient.    C3 nurse attempted to contact Gama Huertas for a TCC post hospital discharge follow up call. C3 nurse was not able to speak to the patient, as he hung up the phone. The patient does not have a HOSFU appointment with a Provider within 5-7 days post hospital discharge date 7/8/24. Please contact patient and schedule follow up appointment using HOSFU visit type on or before 7/17/24.    Respectfully,    [facility]  [email or web address]    Please do not reply to this message, as this inbox is not routinely monitored.

## 2024-07-16 NOTE — PROGRESS NOTES
3rd attempt-C3 nurse attempted to contact Gama Huertas for a TCC post hospital discharge follow up call. No answer. Left voicemail with callback information, and OOC#. The patient does not have a scheduled HOSFU appointment. Message previously sent to PCP staff for assistance with scheduling visit with patient, and they have attempted to contact the patient for scheduling.

## 2024-07-19 ENCOUNTER — TELEPHONE (OUTPATIENT)
Dept: FAMILY MEDICINE | Facility: CLINIC | Age: 74
End: 2024-07-19

## 2024-07-19 ENCOUNTER — OFFICE VISIT (OUTPATIENT)
Dept: FAMILY MEDICINE | Facility: CLINIC | Age: 74
End: 2024-07-19
Payer: MEDICARE

## 2024-07-19 VITALS
SYSTOLIC BLOOD PRESSURE: 138 MMHG | HEART RATE: 101 BPM | WEIGHT: 121.5 LBS | DIASTOLIC BLOOD PRESSURE: 78 MMHG | HEIGHT: 66 IN | BODY MASS INDEX: 19.53 KG/M2 | TEMPERATURE: 98 F | OXYGEN SATURATION: 96 %

## 2024-07-19 DIAGNOSIS — I10 ESSENTIAL HYPERTENSION: ICD-10-CM

## 2024-07-19 DIAGNOSIS — D50.0 IRON DEFICIENCY ANEMIA DUE TO CHRONIC BLOOD LOSS: ICD-10-CM

## 2024-07-19 DIAGNOSIS — Z86.010 HISTORY OF COLON POLYPS: ICD-10-CM

## 2024-07-19 DIAGNOSIS — K25.4 CHRONIC GASTRIC ULCER WITH HEMORRHAGE: ICD-10-CM

## 2024-07-19 DIAGNOSIS — D50.9 MICROCYTIC ANEMIA: ICD-10-CM

## 2024-07-19 DIAGNOSIS — K21.9 GASTROESOPHAGEAL REFLUX DISEASE, UNSPECIFIED WHETHER ESOPHAGITIS PRESENT: ICD-10-CM

## 2024-07-19 DIAGNOSIS — R63.4 WEIGHT LOSS: ICD-10-CM

## 2024-07-19 DIAGNOSIS — F03.90 DEMENTIA WITHOUT BEHAVIORAL DISTURBANCE: ICD-10-CM

## 2024-07-19 DIAGNOSIS — R41.82 ALTERED MENTAL STATUS, UNSPECIFIED ALTERED MENTAL STATUS TYPE: Primary | ICD-10-CM

## 2024-07-19 DIAGNOSIS — I70.0 AORTIC ATHEROSCLEROSIS: ICD-10-CM

## 2024-07-19 DIAGNOSIS — F10.20 ALCOHOL USE DISORDER, SEVERE, DEPENDENCE: ICD-10-CM

## 2024-07-19 DIAGNOSIS — Z72.0 TOBACCO USE: ICD-10-CM

## 2024-07-19 DIAGNOSIS — R97.20 PSA ELEVATION: ICD-10-CM

## 2024-07-19 PROCEDURE — 99406 BEHAV CHNG SMOKING 3-10 MIN: CPT | Mod: S$GLB,,, | Performed by: PHYSICIAN ASSISTANT

## 2024-07-19 PROCEDURE — 3075F SYST BP GE 130 - 139MM HG: CPT | Mod: CPTII,S$GLB,, | Performed by: PHYSICIAN ASSISTANT

## 2024-07-19 PROCEDURE — 1126F AMNT PAIN NOTED NONE PRSNT: CPT | Mod: CPTII,S$GLB,, | Performed by: PHYSICIAN ASSISTANT

## 2024-07-19 PROCEDURE — 99215 OFFICE O/P EST HI 40 MIN: CPT | Mod: 25,S$GLB,, | Performed by: PHYSICIAN ASSISTANT

## 2024-07-19 PROCEDURE — 3078F DIAST BP <80 MM HG: CPT | Mod: CPTII,S$GLB,, | Performed by: PHYSICIAN ASSISTANT

## 2024-07-19 PROCEDURE — 1160F RVW MEDS BY RX/DR IN RCRD: CPT | Mod: CPTII,S$GLB,, | Performed by: PHYSICIAN ASSISTANT

## 2024-07-19 PROCEDURE — 3008F BODY MASS INDEX DOCD: CPT | Mod: CPTII,S$GLB,, | Performed by: PHYSICIAN ASSISTANT

## 2024-07-19 PROCEDURE — 1159F MED LIST DOCD IN RCRD: CPT | Mod: CPTII,S$GLB,, | Performed by: PHYSICIAN ASSISTANT

## 2024-07-19 PROCEDURE — 3288F FALL RISK ASSESSMENT DOCD: CPT | Mod: CPTII,S$GLB,, | Performed by: PHYSICIAN ASSISTANT

## 2024-07-19 PROCEDURE — 1101F PT FALLS ASSESS-DOCD LE1/YR: CPT | Mod: CPTII,S$GLB,, | Performed by: PHYSICIAN ASSISTANT

## 2024-07-19 RX ORDER — FERROUS SULFATE 325(65) MG
325 TABLET ORAL DAILY
Qty: 90 TABLET | Refills: 3 | Status: SHIPPED | OUTPATIENT
Start: 2024-07-19

## 2024-07-19 RX ORDER — AMLODIPINE BESYLATE 10 MG/1
TABLET ORAL
Qty: 90 TABLET | Refills: 3 | Status: SHIPPED | OUTPATIENT
Start: 2024-07-19
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
## 2024-07-19 NOTE — TELEPHONE ENCOUNTER
I have discussed with Shanae she stated she would like to see pt in 1 month due to Dr. Meléndez's schedule being booked.    I have spoke to pt and he confirmed appt for 8/19/24    ----- Message from Shanae Barrera PA-C sent at 7/19/2024  4:01 PM CDT -----  Please schedule pt to follow up with PCP next available. Thanks

## 2024-07-23 ENCOUNTER — TELEPHONE (OUTPATIENT)
Dept: FAMILY MEDICINE | Facility: CLINIC | Age: 74
End: 2024-07-23
Payer: MEDICARE

## 2024-07-23 NOTE — TELEPHONE ENCOUNTER
Spoke to pt. Pt needs refills for all medication besides norvasc, lexapro, and ferosul. Please send them to the [facility].

## 2024-07-23 NOTE — TELEPHONE ENCOUNTER
Richard Meléndez MD  You 3 minutes ago (4:38 PM)       Where is he?       Pt stated that he was living with a family member but in a separate part of the house at his visit with Shanae on 7/19/24

## 2024-07-23 NOTE — TELEPHONE ENCOUNTER
----- Message from Richard Meléndez MD sent at 7/23/2024 11:24 AM CDT -----  Try to get pt into [facility]  ----- Message -----  From: Sheryl Ballesteros  Sent: 7/16/2024   5:04 PM CDT  To: Richard Meléndez MD    Type:  Pt Advice     Who Called: Pt's friend Mr.Rowell Jose   Does the patient know what this is regarding?: friend states pt has been wondering the streets after being discharged from [facility] and would like pt to be seen asap. Pt does not sleep, eat or take his medicine   Would the patient rather a call back or a response via [facility]? Call   Best Call Back Number: [phone]  / [phone]  Additional Information:

## 2024-07-23 NOTE — TELEPHONE ENCOUNTER
----- Message from Coretta Zambrano sent at 7/23/2024  1:35 PM CDT -----  Type:  Needs Medical Advice    Who Called: pt  Would the patient rather a call back or a response via [facility]? call  Best Call Back Number:  [phone]  Additional Information: pt has questions regarding medication and if he needs any refills at this time

## 2024-07-25 NOTE — TELEPHONE ENCOUNTER
No care due was identified.  [facility] Embedded Care Due Messages. Reference number: [id number].   7/25/2024 4:47:06 PM CDT

## 2024-07-26 ENCOUNTER — LAB VISIT (OUTPATIENT)
Dept: LAB | Facility: HOSPITAL | Age: 74
End: 2024-07-26
Attending: PHYSICIAN ASSISTANT
Payer: MEDICARE

## 2024-07-26 DIAGNOSIS — I70.0 AORTIC ATHEROSCLEROSIS: ICD-10-CM

## 2024-07-26 DIAGNOSIS — D50.9 MICROCYTIC ANEMIA: ICD-10-CM

## 2024-07-26 LAB
ALBUMIN SERPL BCP-MCNC: 4.1 G/DL (ref 3.5–5.2)
ALP SERPL-CCNC: 101 U/L (ref 38–126)
ALT SERPL W/O P-5'-P-CCNC: 14 U/L (ref 10–44)
ANION GAP SERPL CALC-SCNC: 12 MMOL/L (ref 8–16)
AST SERPL-CCNC: 29 U/L (ref 15–46)
BASOPHILS # BLD AUTO: 0.07 K/UL (ref 0–0.2)
BASOPHILS NFR BLD: 1 % (ref 0–1.9)
BILIRUB SERPL-MCNC: 0.1 MG/DL (ref 0.1–1)
CALCIUM SERPL-MCNC: 8.8 MG/DL (ref 8.7–10.5)
CHLORIDE SERPL-SCNC: 102 MMOL/L (ref 95–110)
CO2 SERPL-SCNC: 27 MMOL/L (ref 23–29)
CREAT SERPL-MCNC: 0.91 MG/DL (ref 0.5–1.4)
DIFFERENTIAL METHOD BLD: ABNORMAL
EOSINOPHIL # BLD AUTO: 0.1 K/UL (ref 0–0.5)
EOSINOPHIL NFR BLD: 1.8 % (ref 0–8)
ERYTHROCYTE [DISTWIDTH] IN BLOOD BY AUTOMATED COUNT: 15.7 % (ref 11.5–14.5)
EST. GFR  (NO RACE VARIABLE): >60 ML/MIN/1.73 M^2
GLUCOSE SERPL-MCNC: 96 MG/DL (ref 70–110)
HCT VFR BLD AUTO: 32.6 % (ref 40–54)
HGB BLD-MCNC: 9.7 G/DL (ref 14–18)
IMM GRANULOCYTES # BLD AUTO: 0.03 K/UL (ref 0–0.04)
IMM GRANULOCYTES NFR BLD AUTO: 0.4 % (ref 0–0.5)
LYMPHOCYTES # BLD AUTO: 2.1 K/UL (ref 1–4.8)
LYMPHOCYTES NFR BLD: 29.4 % (ref 18–48)
MCH RBC QN AUTO: 23.5 PG (ref 27–31)
MCHC RBC AUTO-ENTMCNC: 29.8 G/DL (ref 32–36)
MCV RBC AUTO: 79 FL (ref 82–98)
MONOCYTES # BLD AUTO: 0.9 K/UL (ref 0.3–1)
MONOCYTES NFR BLD: 12.3 % (ref 4–15)
NEUTROPHILS # BLD AUTO: 4 K/UL (ref 1.8–7.7)
NEUTROPHILS NFR BLD: 55.1 % (ref 38–73)
NRBC BLD-RTO: 0 /100 WBC
NT-PROBNP SERPL-MCNC: 1350 PG/ML (ref 5–900)
PLATELET # BLD AUTO: 411 K/UL (ref 150–450)
PMV BLD AUTO: 9.2 FL (ref 9.2–12.9)
POTASSIUM SERPL-SCNC: 4.2 MMOL/L (ref 3.5–5.1)
PROT SERPL-MCNC: 7.4 G/DL (ref 6–8.4)
RBC # BLD AUTO: 4.12 M/UL (ref 4.6–6.2)
SODIUM SERPL-SCNC: 141 MMOL/L (ref 136–145)
UUN UR-MCNC: 14 MG/DL (ref 2–20)
WBC # BLD AUTO: 7.25 K/UL (ref 3.9–12.7)

## 2024-07-26 PROCEDURE — 80053 COMPREHEN METABOLIC PANEL: CPT | Mod: PN | Performed by: PHYSICIAN ASSISTANT

## 2024-07-26 PROCEDURE — 36415 COLL VENOUS BLD VENIPUNCTURE: CPT | Mod: PN | Performed by: PHYSICIAN ASSISTANT

## 2024-07-26 PROCEDURE — 83880 ASSAY OF NATRIURETIC PEPTIDE: CPT | Mod: PN | Performed by: PHYSICIAN ASSISTANT

## 2024-07-26 PROCEDURE — 85025 COMPLETE CBC W/AUTO DIFF WBC: CPT | Mod: PN | Performed by: PHYSICIAN ASSISTANT

## 2024-07-30 RX ORDER — FOLIC ACID 1 MG/1
1000 TABLET ORAL
Qty: 100 TABLET | Refills: 11 | Status: SHIPPED | OUTPATIENT
Start: 2024-07-30

## 2024-07-31 ENCOUNTER — TELEPHONE (OUTPATIENT)
Dept: FAMILY MEDICINE | Facility: CLINIC | Age: 74
End: 2024-07-31
Payer: MEDICARE

## 2024-07-31 NOTE — TELEPHONE ENCOUNTER
----- Message from Dolly Villela sent at 7/31/2024  7:58 AM CDT -----  Regarding: Results  [facility] Team,    Can you call pt on why he needs to see Hematology And Oncology...    Thank you,  Dolly

## 2024-08-06 ENCOUNTER — TELEPHONE (OUTPATIENT)
Dept: FAMILY MEDICINE | Facility: CLINIC | Age: 74
End: 2024-08-06
Payer: MEDICARE

## 2024-08-06 DIAGNOSIS — R79.89 ELEVATED BRAIN NATRIURETIC PEPTIDE (BNP) LEVEL: Primary | ICD-10-CM

## 2024-08-13 ENCOUNTER — TELEPHONE (OUTPATIENT)
Dept: FAMILY MEDICINE | Facility: CLINIC | Age: 74
End: 2024-08-13
Payer: MEDICARE

## 2024-08-13 RX ORDER — ESCITALOPRAM OXALATE 5 MG/1
5 TABLET ORAL
Qty: 90 TABLET | Refills: 0 | Status: SHIPPED | OUTPATIENT
Start: 2024-08-13

## 2024-08-13 NOTE — TELEPHONE ENCOUNTER
No care due was identified.  [facility] Embedded Care Due Messages. Reference number: [id number].   8/13/2024 10:01:42 AM CDT

## 2024-08-13 NOTE — TELEPHONE ENCOUNTER
----- Message from Gisele Kim sent at 8/13/2024 10:14 AM CDT -----  Type:  RX Refill Request    Who Called: pt  Refill or New Rx:refill  RX Name and Strength:EScitalopram oxalate (LEXAPRO) 5 MG Tab  How is the patient currently taking it? (ex. 1XDay):Route: TAKE 1 TABLET BY MOUTH ONCE A DAY - Oral  Is this a 30 day or 90 day RX:90  Preferred Pharmacy with phone number:[id number] - [address]   Phone: [phone]  Fax: [phone]  Local or Mail Order:local  Ordering Provider:Richard Meléndez MD  Would the patient rather a call back or a response via [facility]?   Best Call Back Number:  Additional Information:

## 2024-08-23 RX ORDER — ESCITALOPRAM OXALATE 5 MG/1
5 TABLET ORAL
Qty: 90 TABLET | Refills: 0 | Status: SHIPPED | OUTPATIENT
Start: 2024-08-23

## 2024-08-23 NOTE — TELEPHONE ENCOUNTER
No care due was identified.  [facility] Embedded Care Due Messages. Reference number: [id number].   8/23/2024 9:42:12 AM CDT

## 2024-08-27 ENCOUNTER — OFFICE VISIT (OUTPATIENT)
Dept: FAMILY MEDICINE | Facility: CLINIC | Age: 74
End: 2024-08-27
Payer: MEDICARE

## 2024-08-27 VITALS
HEIGHT: 66 IN | TEMPERATURE: 98 F | WEIGHT: 119.19 LBS | BODY MASS INDEX: 19.15 KG/M2 | OXYGEN SATURATION: 97 % | HEART RATE: 72 BPM | DIASTOLIC BLOOD PRESSURE: 76 MMHG | SYSTOLIC BLOOD PRESSURE: 136 MMHG

## 2024-08-27 DIAGNOSIS — R79.89 ELEVATED BRAIN NATRIURETIC PEPTIDE (BNP) LEVEL: ICD-10-CM

## 2024-08-27 DIAGNOSIS — F41.9 ANXIETY: ICD-10-CM

## 2024-08-27 DIAGNOSIS — I10 PRIMARY HYPERTENSION: Primary | ICD-10-CM

## 2024-08-27 DIAGNOSIS — Z12.11 SCREENING FOR COLON CANCER: ICD-10-CM

## 2024-08-27 PROCEDURE — 99214 OFFICE O/P EST MOD 30 MIN: CPT | Mod: S$GLB,,, | Performed by: PHYSICIAN ASSISTANT
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
## 2024-08-27 NOTE — PROGRESS NOTES
Discussed vaccines with patient; Advised due to insurance he would have to get at pharmacy  Colonoscopy ordered

## 2024-09-12 ENCOUNTER — TELEPHONE (OUTPATIENT)
Dept: CARDIOLOGY | Facility: CLINIC | Age: 74
End: 2024-09-12
Payer: MEDICARE

## 2024-09-12 DIAGNOSIS — R79.89 ELEVATED BRAIN NATRIURETIC PEPTIDE (BNP) LEVEL: Primary | ICD-10-CM

## 2024-09-12 NOTE — TELEPHONE ENCOUNTER
----- Message from Lisa Garcia sent at 9/12/2024  9:31 AM CDT -----  Type:  Needs Medical Advice    Who Called: pt  Symptoms (please be specific): pt needs a call back to reschedule the appt for today please call to get in as soon as possible    Would the patient rather a call back or a response via [facility]? call  Best Call Back Number: [phone]  Additional Information:

## 2024-09-26 ENCOUNTER — OFFICE VISIT (OUTPATIENT)
Dept: CARDIOLOGY | Facility: CLINIC | Age: 74
End: 2024-09-26
Payer: MEDICARE

## 2024-09-26 VITALS
HEIGHT: 66 IN | BODY MASS INDEX: 19.85 KG/M2 | DIASTOLIC BLOOD PRESSURE: 70 MMHG | WEIGHT: 123.5 LBS | HEART RATE: 76 BPM | SYSTOLIC BLOOD PRESSURE: 155 MMHG

## 2024-09-26 DIAGNOSIS — F10.10 ALCOHOL ABUSE: ICD-10-CM

## 2024-09-26 DIAGNOSIS — R79.89 ELEVATED BRAIN NATRIURETIC PEPTIDE (BNP) LEVEL: ICD-10-CM

## 2024-09-26 DIAGNOSIS — I10 PRIMARY HYPERTENSION: ICD-10-CM

## 2024-09-26 DIAGNOSIS — R55 SYNCOPE, UNSPECIFIED SYNCOPE TYPE: Primary | ICD-10-CM

## 2024-09-26 DIAGNOSIS — I70.0 AORTIC ATHEROSCLEROSIS: ICD-10-CM

## 2024-09-26 PROCEDURE — 99999 PR PBB SHADOW E&M-EST. PATIENT-LVL IV: CPT | Mod: PBBFAC,,, | Performed by: STUDENT IN AN ORGANIZED HEALTH CARE EDUCATION/TRAINING PROGRAM

## 2024-09-26 RX ORDER — LOSARTAN POTASSIUM 25 MG/1
25 TABLET ORAL DAILY
Qty: 90 TABLET | Refills: 3 | Status: SHIPPED | OUTPATIENT
Start: 2024-09-26 | End: 2025-09-26
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
## 2024-10-01 ENCOUNTER — HOSPITAL ENCOUNTER (OUTPATIENT)
Dept: CARDIOLOGY | Facility: HOSPITAL | Age: 74
Discharge: HOME | End: 2024-10-01
Attending: STUDENT IN AN ORGANIZED HEALTH CARE EDUCATION/TRAINING PROGRAM
Payer: MEDICARE

## 2024-10-01 VITALS — WEIGHT: 119 LBS | HEIGHT: 66 IN | BODY MASS INDEX: 19.13 KG/M2

## 2024-10-01 DIAGNOSIS — I70.0 AORTIC ATHEROSCLEROSIS: ICD-10-CM

## 2024-10-01 DIAGNOSIS — I10 PRIMARY HYPERTENSION: ICD-10-CM

## 2024-10-01 LAB
APICAL FOUR CHAMBER EJECTION FRACTION: 70 %
APICAL TWO CHAMBER EJECTION FRACTION: 71 %
ASCENDING AORTA: 3.52 CM
AV INDEX (PROSTH): 0.71
AV MEAN GRADIENT: 12.8 MMHG
AV PEAK GRADIENT: 27 MMHG
AV REGURGITATION PRESSURE HALF TIME: 514.28 MS
AV VALVE AREA BY VELOCITY RATIO: 1.8 CM²
AV VALVE AREA: 2.2 CM²
AV VELOCITY RATIO: 0.58
BSA FOR ECHO PROCEDURE: 1.59 M2
CV ECHO LV RWT: 0.39 CM
DOP CALC AO PEAK VEL: 2.6 M/S
DOP CALC AO VTI: 43.8 CM
DOP CALC LVOT AREA: 3.1 CM2
DOP CALC LVOT DIAMETER: 2 CM
DOP CALC LVOT PEAK VEL: 1.5 M/S
DOP CALC LVOT STROKE VOLUME: 98.3 CM3
DOP CALC MV VTI: 36.7 CM
DOP CALCLVOT PEAK VEL VTI: 31.3 CM
E WAVE DECELERATION TIME: 322.47 MSEC
E/A RATIO: 0.38
E/E' RATIO: 7.09 M/S
ECHO LV POSTERIOR WALL: 1 CM (ref 0.6–1.1)
FRACTIONAL SHORTENING: 43.1 % (ref 28–44)
INTERVENTRICULAR SEPTUM: 1 CM (ref 0.6–1.1)
IVC DIAMETER: 1.76 CM
LEFT ATRIUM AREA SYSTOLIC (APICAL 2 CHAMBER): 16.84 CM2
LEFT ATRIUM AREA SYSTOLIC (APICAL 4 CHAMBER): 16.36 CM2
LEFT ATRIUM SIZE: 3.63 CM
LEFT ATRIUM VOLUME INDEX MOD: 28.9 ML/M2
LEFT ATRIUM VOLUME MOD: 46.23 ML
LEFT INTERNAL DIMENSION IN SYSTOLE: 2.9 CM (ref 2.1–4)
LEFT VENTRICLE DIASTOLIC VOLUME INDEX: 77.07 ML/M2
LEFT VENTRICLE DIASTOLIC VOLUME: 123.31 ML
LEFT VENTRICLE END DIASTOLIC VOLUME APICAL 2 CHAMBER: 88.24 ML
LEFT VENTRICLE END DIASTOLIC VOLUME APICAL 4 CHAMBER: 116.29 ML
LEFT VENTRICLE END SYSTOLIC VOLUME APICAL 2 CHAMBER: 46.9 ML
LEFT VENTRICLE END SYSTOLIC VOLUME APICAL 4 CHAMBER: 45.88 ML
LEFT VENTRICLE MASS INDEX: 117.5 G/M2
LEFT VENTRICLE SYSTOLIC VOLUME INDEX: 20.2 ML/M2
LEFT VENTRICLE SYSTOLIC VOLUME: 32.32 ML
LEFT VENTRICULAR INTERNAL DIMENSION IN DIASTOLE: 5.1 CM (ref 3.5–6)
LEFT VENTRICULAR MASS: 188 G
LV LATERAL E/E' RATIO: 6.5 M/S
LV SEPTAL E/E' RATIO: 7.8 M/S
LVED V (TEICH): 123.31 ML
LVES V (TEICH): 32.32 ML
LVOT MG: 4.5 MMHG
LVOT MV: 0.99 CM/S
MV A" WAVE DURATION": 98.95 MSEC
MV MEAN GRADIENT: 2 MMHG
MV PEAK A VEL: 1.03 M/S
MV PEAK E VEL: 0.39 M/S
MV PEAK GRADIENT: 4 MMHG
MV STENOSIS PRESSURE HALF TIME: 93.52 MS
MV VALVE AREA BY CONTINUITY EQUATION: 2.68 CM2
MV VALVE AREA P 1/2 METHOD: 2.35 CM2
OHS LV EJECTION FRACTION SIMPSONS BIPLANE MOD: 71 %
PISA AR MAX VEL: 4.93 M/S
PISA MRMAX VEL: 5.31 M/S
PISA TR MAX VEL: 2.4 M/S
PULM VEIN S/D RATIO: 0.61
PV PEAK D VEL: 0.75 M/S
PV PEAK GRADIENT: 2 MMHG
PV PEAK S VEL: 0.46 M/S
PV PEAK VELOCITY: 0.7 M/S
RA PRESSURE ESTIMATED: 3 MMHG
RA VOL SYS: 31.77 ML
RIGHT ATRIAL AREA: 12.6 CM2
RIGHT ATRIUM VOLUME AREA LENGTH APICAL 4 CHAMBER: 30.04 ML
RIGHT VENTRICULAR END-DIASTOLIC DIMENSION: 2.39 CM
RV TB RVSP: 5 MMHG
RV TISSUE DOPPLER FREE WALL SYSTOLIC VELOCITY 1 (APICAL 4 CHAMBER VIEW): 17.14 CM/S
SINUS: 3.61 CM
STJ: 3.96 CM
TDI LATERAL: 0.06 M/S
TDI SEPTAL: 0.05 M/S
TDI: 0.06 M/S
TR MAX PG: 23 MMHG
TRICUSPID ANNULAR PLANE SYSTOLIC EXCURSION: 3.01 CM
TV REST PULMONARY ARTERY PRESSURE: 26 MMHG
Z-SCORE OF LEFT VENTRICULAR DIMENSION IN END DIASTOLE: 1.13
Z-SCORE OF LEFT VENTRICULAR DIMENSION IN END SYSTOLE: 0.21

## 2024-10-01 PROCEDURE — 93306 TTE W/DOPPLER COMPLETE: CPT | Mod: 26,,, | Performed by: INTERNAL MEDICINE

## 2024-10-01 PROCEDURE — 93306 TTE W/DOPPLER COMPLETE: CPT | Mod: PN

## 2024-10-04 ENCOUNTER — TELEPHONE (OUTPATIENT)
Dept: CARDIOLOGY | Facility: CLINIC | Age: 74
End: 2024-10-04
Payer: MEDICARE

## 2024-10-04 NOTE — TELEPHONE ENCOUNTER
----- Message from Lester Charlton MD sent at 10/3/2024 11:07 PM CDT -----  Please contact the patient and let them know that I have reviewed the results of his echo- they are few leaky valves which we have to keep a close follow up with echocardiogram every 6-12 months.  Continue with the current plan.

## 2024-10-04 NOTE — TELEPHONE ENCOUNTER
----- Message from Sheryl sent at 10/4/2024  2:54 PM CDT -----  Type:  Patient Returning Call    Who Called: Pt   Does the patient know what this is regarding?: returning missed call   Would the patient rather a call back or a response via [employer]? Call   Best Call Back Number:[phone]   Additional Information:

## 2024-11-01 RX ORDER — GABAPENTIN 300 MG/1
300 CAPSULE ORAL 2 TIMES DAILY
Qty: 180 CAPSULE | Refills: 1 | Status: SHIPPED | OUTPATIENT
Start: 2024-11-01

## 2024-11-05 DIAGNOSIS — D50.9 MICROCYTIC ANEMIA: ICD-10-CM

## 2024-11-05 DIAGNOSIS — I10 ESSENTIAL HYPERTENSION: ICD-10-CM

## 2024-11-05 DIAGNOSIS — Z86.0100 HISTORY OF COLON POLYPS: ICD-10-CM

## 2024-11-05 DIAGNOSIS — R63.4 WEIGHT LOSS: ICD-10-CM

## 2024-11-05 DIAGNOSIS — D50.0 IRON DEFICIENCY ANEMIA DUE TO CHRONIC BLOOD LOSS: ICD-10-CM

## 2024-11-05 DIAGNOSIS — K21.9 GASTROESOPHAGEAL REFLUX DISEASE: ICD-10-CM

## 2024-11-05 DIAGNOSIS — R97.20 PSA ELEVATION: ICD-10-CM

## 2024-11-05 DIAGNOSIS — K25.4 CHRONIC GASTRIC ULCER WITH HEMORRHAGE: ICD-10-CM

## 2024-11-05 DIAGNOSIS — Z00.00 WELLNESS EXAMINATION: ICD-10-CM

## 2024-11-05 NOTE — TELEPHONE ENCOUNTER
No care due was identified.  [facility] Embedded Care Due Messages. Reference number: [id number].   11/05/2024 12:27:47 PM CST

## 2024-11-08 RX ORDER — METOPROLOL TARTRATE 50 MG/1
50 TABLET ORAL 2 TIMES DAILY
Qty: 180 TABLET | Refills: 3 | Status: SHIPPED | OUTPATIENT
Start: 2024-11-08

## 2024-11-26 DIAGNOSIS — K21.9 GASTROESOPHAGEAL REFLUX DISEASE WITHOUT ESOPHAGITIS: ICD-10-CM

## 2024-11-26 DIAGNOSIS — Z86.0100 HISTORY OF COLON POLYPS: ICD-10-CM

## 2024-11-26 DIAGNOSIS — D50.0 IRON DEFICIENCY ANEMIA DUE TO CHRONIC BLOOD LOSS: ICD-10-CM

## 2024-11-26 DIAGNOSIS — Z00.00 WELLNESS EXAMINATION: ICD-10-CM

## 2024-11-26 DIAGNOSIS — D50.9 MICROCYTIC ANEMIA: ICD-10-CM

## 2024-11-26 DIAGNOSIS — I10 ESSENTIAL HYPERTENSION: ICD-10-CM

## 2024-11-26 DIAGNOSIS — R63.4 WEIGHT LOSS: ICD-10-CM

## 2024-11-26 DIAGNOSIS — R97.20 PSA ELEVATION: ICD-10-CM

## 2024-11-26 DIAGNOSIS — K25.4 CHRONIC GASTRIC ULCER WITH HEMORRHAGE: ICD-10-CM

## 2024-11-26 NOTE — TELEPHONE ENCOUNTER
No care due was identified.  [facility] Embedded Care Due Messages. Reference number: [id number].   11/26/2024 4:29:22 PM CST

## 2024-11-29 RX ORDER — PANTOPRAZOLE SODIUM 40 MG/1
40 TABLET, DELAYED RELEASE ORAL
Qty: 90 TABLET | Refills: 3 | Status: SHIPPED | OUTPATIENT
Start: 2024-11-29

## 2025-01-16 ENCOUNTER — OFFICE VISIT (OUTPATIENT)
Dept: FAMILY MEDICINE | Facility: CLINIC | Age: 75
End: 2025-01-16
Payer: MEDICARE

## 2025-01-16 VITALS
HEIGHT: 66 IN | WEIGHT: 126 LBS | TEMPERATURE: 98 F | HEART RATE: 67 BPM | DIASTOLIC BLOOD PRESSURE: 70 MMHG | SYSTOLIC BLOOD PRESSURE: 160 MMHG | BODY MASS INDEX: 20.25 KG/M2 | OXYGEN SATURATION: 95 %

## 2025-01-16 DIAGNOSIS — Z12.5 ENCOUNTER FOR SCREENING FOR MALIGNANT NEOPLASM OF PROSTATE: ICD-10-CM

## 2025-01-16 DIAGNOSIS — K25.4 CHRONIC GASTRIC ULCER WITH HEMORRHAGE: Primary | ICD-10-CM

## 2025-01-16 DIAGNOSIS — I10 PRIMARY HYPERTENSION: ICD-10-CM

## 2025-01-16 DIAGNOSIS — Z12.11 COLON CANCER SCREENING: ICD-10-CM

## 2025-01-16 DIAGNOSIS — J43.9 PULMONARY EMPHYSEMA, UNSPECIFIED EMPHYSEMA TYPE: Chronic | ICD-10-CM

## 2025-01-16 DIAGNOSIS — D50.9 MICROCYTIC ANEMIA: ICD-10-CM

## 2025-01-16 DIAGNOSIS — D50.0 IRON DEFICIENCY ANEMIA DUE TO CHRONIC BLOOD LOSS: ICD-10-CM

## 2025-01-16 DIAGNOSIS — I10 ESSENTIAL HYPERTENSION: ICD-10-CM

## 2025-01-16 DIAGNOSIS — F17.200 SMOKES: ICD-10-CM

## 2025-01-16 DIAGNOSIS — F10.20 ALCOHOL USE DISORDER, SEVERE, DEPENDENCE: ICD-10-CM

## 2025-01-16 DIAGNOSIS — F41.9 ANXIETY: ICD-10-CM

## 2025-01-16 DIAGNOSIS — I70.0 AORTIC ATHEROSCLEROSIS: ICD-10-CM

## 2025-01-16 DIAGNOSIS — E74.819 DISORDERS OF GLUCOSE TRANSPORT, UNSPECIFIED: ICD-10-CM

## 2025-01-16 DIAGNOSIS — M94.9 DISORDER OF CARTILAGE, UNSPECIFIED: ICD-10-CM

## 2025-01-16 DIAGNOSIS — F03.90 DEMENTIA WITHOUT BEHAVIORAL DISTURBANCE: ICD-10-CM

## 2025-01-16 DIAGNOSIS — F10.10 ALCOHOL ABUSE: ICD-10-CM

## 2025-01-16 DIAGNOSIS — D69.2 OTHER NONTHROMBOCYTOPENIC PURPURA: ICD-10-CM

## 2025-01-16 DIAGNOSIS — Z87.891 PERSONAL HISTORY OF NICOTINE DEPENDENCE: ICD-10-CM

## 2025-01-16 DIAGNOSIS — R97.20 PSA ELEVATION: ICD-10-CM

## 2025-01-16 DIAGNOSIS — Z00.00 WELLNESS EXAMINATION: ICD-10-CM

## 2025-01-16 PROCEDURE — 1100F PTFALLS ASSESS-DOCD GE2>/YR: CPT | Mod: CPTII,S$GLB,, | Performed by: FAMILY MEDICINE

## 2025-01-16 PROCEDURE — 3008F BODY MASS INDEX DOCD: CPT | Mod: CPTII,S$GLB,, | Performed by: FAMILY MEDICINE

## 2025-01-16 PROCEDURE — 99214 OFFICE O/P EST MOD 30 MIN: CPT | Mod: S$GLB,,, | Performed by: FAMILY MEDICINE

## 2025-01-16 PROCEDURE — 3288F FALL RISK ASSESSMENT DOCD: CPT | Mod: CPTII,S$GLB,, | Performed by: FAMILY MEDICINE

## 2025-01-16 PROCEDURE — 1159F MED LIST DOCD IN RCRD: CPT | Mod: CPTII,S$GLB,, | Performed by: FAMILY MEDICINE

## 2025-01-16 PROCEDURE — 3077F SYST BP >= 140 MM HG: CPT | Mod: CPTII,S$GLB,, | Performed by: FAMILY MEDICINE

## 2025-01-16 PROCEDURE — 3078F DIAST BP <80 MM HG: CPT | Mod: CPTII,S$GLB,, | Performed by: FAMILY MEDICINE

## 2025-01-16 PROCEDURE — 1126F AMNT PAIN NOTED NONE PRSNT: CPT | Mod: CPTII,S$GLB,, | Performed by: FAMILY MEDICINE

## 2025-01-16 PROCEDURE — 1160F RVW MEDS BY RX/DR IN RCRD: CPT | Mod: CPTII,S$GLB,, | Performed by: FAMILY MEDICINE

## 2025-01-16 RX ORDER — FERROUS SULFATE 325(65) MG
325 TABLET ORAL DAILY
Qty: 90 TABLET | Refills: 3 | Status: SHIPPED | OUTPATIENT
Start: 2025-01-16

## 2025-01-26 NOTE — PROGRESS NOTES
Subjective:   @Patient ID:  Gama Huertas is a 74 y.o. male who presents for evaluation of No chief complaint on file.      HPI:   Gama Huertas is a 73 y.o.  male with a PMH of alcohol abuse, HTN, depression, tobacco abuse, GERD, and pulmonary emphysema here for follow up. Echo showed moderate AR and possible LV non compaction. Patient is symptomatic. BP is uncontrolled. Denies cp, sob, palpitations, presyncope/dizziness, syncope, orthopnea, PND, bendopnea, decrease ET, NVDC, fever, chills.       CTA CA++    10/1/24    Left Ventricle: The left ventricle is normal in size. There is concentric hypertrophy.  Prominent LV trabeculations.  Suboptimal images to completely exclude LV noncompaction.  Consider cardiac MRI for better evaluation if clinically indicated. There is normal systolic function with a visually estimated ejection fraction of 60 - 65%. There is normal diastolic function.    Right Ventricle: Normal right ventricular cavity size. Wall thickness is normal. Systolic function is normal.    Left Atrium: Left atrium is mildly dilated.    Aortic Valve: The aortic valve is a trileaflet valve. There is mild aortic valve sclerosis. There is moderate aortic regurgitation.    Mitral Valve: There is mild regurgitation.    Tricuspid Valve: There is mild regurgitation.    Pulmonary Artery: The estimated pulmonary artery systolic pressure is 21 mmHg.    IVC/SVC: Normal venous pressure at 3 mmHg.    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    Aortic atherosclerosis 05/09/2024    Pulmonary emphysema, unspecified emphysema type 05/09/2024    Syncope 05/09/2024    History of noncompliance with medical treatment, presenting hazards to health 05/09/2024     - See syncope      Adjustment disorder with mixed anxiety and depressed mood 03/27/2023    Alcohol use disorder, severe, dependence  "2023    Alcohol withdrawal syndrome without complication 2023    Alcohol abuse 2023    Other nonthrombocytopenic purpura 2023    Dementia without behavioral disturbance 2023    Smokes 2023    PSA elevation 2019    Hypertension 07/10/2017    Anxiety 07/10/2017    History of colon polyps 07/10/2017    Microcytic anemia 2015           Right Arm BP - Sittin/76  Left Arm BP - Sittin/80        LABS  CBC  @LABRCNTIP(WBC:3,RBC:3,HGB:3,HCT:3,PLT:3,MCV:3,MCH:3,MCHC:3)@  BMP  @LABRCNTIP(NA:3,K:3,CO2:3,CL:3,BUN:3,Creatinine:3,GLU:3,GFR:3)@    POCT-Glucose  No results found for: "POCTGLUCOSE"    @LABRCNTIP(Calcium:3,MG:3,PHOS:3)@  LFT  @LABRCNTIP(PROT:3,Albumin:3,,Bilitot:3,AST:3,Alkphos:3,ALT:3)@  GFR     COAGS  @LABRCNTIP(PT:3,INR:3,APTT:3)@  CE  @LABRCNTIP(troponini:3,cktotal:3,ckmb:3)@  ABGs  @[id number](PH,PCO2,PO2,HCO3,POCSATURATED,BE)@  BNP  @LABRCNTIP(BNP:3)@    LAST HbA1c  No results found for: "HGBA1C"    Lipid panel  Lab Results   Component Value Date    CHOL 139 2024    CHOL 158 2020    CHOL 198 2019     Lab Results   Component Value Date    HDL 46 2024    HDL 33 (L) 2020    HDL 41 2019     Lab Results   Component Value Date    LDLCALC 80.2 2024    LDLCALC 102.6 2020    LDLCALC 131.8 2019     Lab Results   Component Value Date    TRIG 64 2024    TRIG 112 2020    TRIG 126 2019     Lab Results   Component Value Date    CHOLHDL 33.1 2024    CHOLHDL 20.9 2020    CHOLHDL 20.7 2019            Review of Systems   All other systems reviewed and are negative.      Objective:   Physical Exam  Gen: AOx3, NAD, comfortable appearing  HEENT: NCAT, PERRL, EOMI, MMM, JVP, no thyromegaly, lymphadenopathy appreciated  CV: +murmur  Resp: CTAB, no increased WOB  Abdomen: Soft, NT, ND, +BS  Ext: 2+ distal pulses, no edema appreciated  Skin: Warm, dry, no rashes appreciated  Psych: Good " mood, Affect  Neuro: AAOx4, Strength 5/5 in extremities bilaterally; sensation to touch equal throughout, follows commands      Assessment:     1. LV non-compaction cardiomyopathy    2. Primary hypertension    3. Aortic atherosclerosis    4. Hypertrophic cardiomyopathy    5. Moderate aortic regurgitation          Plan:     Syncope - possible LVNC. Will order cMRI.     2. HTN  - Continue home amlodipine and lopressor  - Increase losartan 25 mg--> 50 mg qd and will continue to uptitrate as needed    3. Tobacco Abuse  - Pt smokes 2ppd  - Ordered nicotine patches.   - Tobacco cessation education prior to discharge    4. Moderate AR: Cardiac MRI   Target BP < 130/80 mmHg  Discussed the importance of med compliance, heart healthy diet, and regular exercise.        He expressed verbal understanding and agreed with the plan    Pertinent cardiac images and EKG reviewed independently.    Continue with current medical plan and lifestyle changes.  Return sooner for concerns or questions. If symptoms persist go to the ED.  I have reviewed all pertinent data including patient's medical history in detail and updated the computerized patient record.     Total time spent counseling greater than fifty percent of total visit time.  Counseling included discussion regarding imaging findings, diagnosis, possibilities, treatment options, risks and benefits.    Thank you for the opportunity to care for this patient. Will be available for questions if needed.       Orders Placed This Encounter   Procedures    MRI Cardiac Morphology Function W WO Contrast     Standing Status:   Future     Standing Expiration Date:   1/30/2026     Order Specific Question:   Does the patient have or ever had a pacemaker or a defibrillator (Note: Some facilities may not be able to schedule an MRI for patients with pacemakers and defibrillators. You should contact your local radiology dept to determine if this is the case.)?     Answer:   No     Order Specific  Question:   Does the patient have an aneurysm or surgical clip, pump, nerve/brain stimulator, middle/inner ear prosthesis, or other metal implant or foreign object (bullet, shrapnel)? If they have a card related to their implant, ask them to bring it. Issues related to the implant may cause the MRI to be delayed.     Answer:   No     Order Specific Question:   Will the patient require po anxiolysis or sedation?     Answer:   No     Order Specific Question:   May the Radiologist modify the order per protocol to meet the clinical needs of the patient?     Answer:   Yes     Order Specific Question:   Does the patient have on a skin patch for medication with aluminized backing?     Answer:   No       Follow up as scheduled. Return sooner for concerns or questions            He expressed verbal understanding and agreed with the plan        Patient's Medications   New Prescriptions    No medications on file   Previous Medications    AMLODIPINE (NORVASC) 10 MG TABLET    TAKE 1 TABLET BY MOUTH ONCE A DAY    COSOPT 22.3-6.8 MG/ML OPHTHALMIC SOLUTION    Place 1 drop into both eyes 2 (two) times daily.    ESCITALOPRAM OXALATE (LEXAPRO) 5 MG TAB    TAKE 1 TABLET BY MOUTH ONCE A DAY    FERROUS SULFATE (FEROSUL) 325 MG (65 MG IRON) TAB TABLET    Take 1 tablet (325 mg total) by mouth once daily.    FOLIC ACID (FOLVITE) 1 MG TABLET    TAKE 1 TABLET BY MOUTH ONCE A DAY    GABAPENTIN (NEURONTIN) 300 MG CAPSULE    TAKE 1 CAPSULE BY MOUTH TWO TIMES A DAY    LATANOPROST 0.005 % OPHTHALMIC SOLUTION    Place 1 drop into both eyes every evening.    METOPROLOL TARTRATE (LOPRESSOR) 50 MG TABLET    TAKE 1 TABLET BY MOUTH TWO TIMES A DAY    NICOTINE POLACRILEX (NICORETTE) 4 MG GUM    Take 1 each (4 mg total) by mouth as needed.    PANTOPRAZOLE (PROTONIX) 40 MG TABLET    TAKE 1 TABLET BY MOUTH EVERY DAY   Modified Medications    Modified Medication Previous Medication    LOSARTAN (COZAAR) 50 MG TABLET losartan (COZAAR) 25 MG tablet       Take 1  tablet (50 mg total) by mouth once daily.    Take 1 tablet (25 mg total) by mouth once daily.   Discontinued Medications    No medications on file        Lester Gerber MD  Cardiovascular Disease [facility]

## 2025-01-28 ENCOUNTER — HOSPITAL ENCOUNTER (OUTPATIENT)
Dept: RADIOLOGY | Facility: HOSPITAL | Age: 75
Discharge: HOME | End: 2025-01-28
Attending: FAMILY MEDICINE
Payer: MEDICARE

## 2025-01-28 DIAGNOSIS — I10 PRIMARY HYPERTENSION: ICD-10-CM

## 2025-01-28 DIAGNOSIS — I10 ESSENTIAL HYPERTENSION: ICD-10-CM

## 2025-01-28 DIAGNOSIS — F03.90 DEMENTIA WITHOUT BEHAVIORAL DISTURBANCE: ICD-10-CM

## 2025-01-28 DIAGNOSIS — I70.0 AORTIC ATHEROSCLEROSIS: ICD-10-CM

## 2025-01-28 DIAGNOSIS — Z00.00 WELLNESS EXAMINATION: ICD-10-CM

## 2025-01-28 DIAGNOSIS — J43.9 PULMONARY EMPHYSEMA, UNSPECIFIED EMPHYSEMA TYPE: Chronic | ICD-10-CM

## 2025-01-28 DIAGNOSIS — Z87.891 PERSONAL HISTORY OF NICOTINE DEPENDENCE: ICD-10-CM

## 2025-01-28 DIAGNOSIS — E74.819 DISORDERS OF GLUCOSE TRANSPORT, UNSPECIFIED: ICD-10-CM

## 2025-01-28 DIAGNOSIS — D69.2 OTHER NONTHROMBOCYTOPENIC PURPURA: ICD-10-CM

## 2025-01-28 DIAGNOSIS — R97.20 PSA ELEVATION: ICD-10-CM

## 2025-01-28 DIAGNOSIS — F41.9 ANXIETY: ICD-10-CM

## 2025-01-28 DIAGNOSIS — F17.200 SMOKES: ICD-10-CM

## 2025-01-28 DIAGNOSIS — F10.10 ALCOHOL ABUSE: ICD-10-CM

## 2025-01-28 DIAGNOSIS — K25.4 CHRONIC GASTRIC ULCER WITH HEMORRHAGE: ICD-10-CM

## 2025-01-28 DIAGNOSIS — D50.9 MICROCYTIC ANEMIA: ICD-10-CM

## 2025-01-28 PROCEDURE — 71271 CT THORAX LUNG CANCER SCR C-: CPT | Mod: TC,PN

## 2025-01-28 PROCEDURE — 71271 CT THORAX LUNG CANCER SCR C-: CPT | Mod: 26,,, | Performed by: RADIOLOGY

## 2025-01-30 ENCOUNTER — OFFICE VISIT (OUTPATIENT)
Dept: CARDIOLOGY | Facility: CLINIC | Age: 75
End: 2025-01-30
Payer: MEDICARE

## 2025-01-30 VITALS
WEIGHT: 132.06 LBS | HEIGHT: 66 IN | HEART RATE: 77 BPM | OXYGEN SATURATION: 95 % | SYSTOLIC BLOOD PRESSURE: 165 MMHG | BODY MASS INDEX: 21.22 KG/M2 | DIASTOLIC BLOOD PRESSURE: 76 MMHG

## 2025-01-30 DIAGNOSIS — I35.1 MODERATE AORTIC REGURGITATION: ICD-10-CM

## 2025-01-30 DIAGNOSIS — I42.2 HYPERTROPHIC CARDIOMYOPATHY: ICD-10-CM

## 2025-01-30 DIAGNOSIS — I10 PRIMARY HYPERTENSION: ICD-10-CM

## 2025-01-30 DIAGNOSIS — I42.8 LV NON-COMPACTION CARDIOMYOPATHY: Primary | ICD-10-CM

## 2025-01-30 DIAGNOSIS — I70.0 AORTIC ATHEROSCLEROSIS: ICD-10-CM

## 2025-01-30 PROCEDURE — 1126F AMNT PAIN NOTED NONE PRSNT: CPT | Mod: CPTII,S$GLB,, | Performed by: STUDENT IN AN ORGANIZED HEALTH CARE EDUCATION/TRAINING PROGRAM

## 2025-01-30 PROCEDURE — 1159F MED LIST DOCD IN RCRD: CPT | Mod: CPTII,S$GLB,, | Performed by: STUDENT IN AN ORGANIZED HEALTH CARE EDUCATION/TRAINING PROGRAM

## 2025-01-30 PROCEDURE — 1160F RVW MEDS BY RX/DR IN RCRD: CPT | Mod: CPTII,S$GLB,, | Performed by: STUDENT IN AN ORGANIZED HEALTH CARE EDUCATION/TRAINING PROGRAM

## 2025-01-30 PROCEDURE — 3288F FALL RISK ASSESSMENT DOCD: CPT | Mod: CPTII,S$GLB,, | Performed by: STUDENT IN AN ORGANIZED HEALTH CARE EDUCATION/TRAINING PROGRAM

## 2025-01-30 PROCEDURE — 3008F BODY MASS INDEX DOCD: CPT | Mod: CPTII,S$GLB,, | Performed by: STUDENT IN AN ORGANIZED HEALTH CARE EDUCATION/TRAINING PROGRAM

## 2025-01-30 PROCEDURE — 1101F PT FALLS ASSESS-DOCD LE1/YR: CPT | Mod: CPTII,S$GLB,, | Performed by: STUDENT IN AN ORGANIZED HEALTH CARE EDUCATION/TRAINING PROGRAM

## 2025-01-30 PROCEDURE — 3078F DIAST BP <80 MM HG: CPT | Mod: CPTII,S$GLB,, | Performed by: STUDENT IN AN ORGANIZED HEALTH CARE EDUCATION/TRAINING PROGRAM

## 2025-01-30 PROCEDURE — 4010F ACE/ARB THERAPY RXD/TAKEN: CPT | Mod: CPTII,S$GLB,, | Performed by: STUDENT IN AN ORGANIZED HEALTH CARE EDUCATION/TRAINING PROGRAM

## 2025-01-30 PROCEDURE — 99999 PR PBB SHADOW E&M-EST. PATIENT-LVL III: CPT | Mod: PBBFAC,,, | Performed by: STUDENT IN AN ORGANIZED HEALTH CARE EDUCATION/TRAINING PROGRAM

## 2025-01-30 PROCEDURE — 3077F SYST BP >= 140 MM HG: CPT | Mod: CPTII,S$GLB,, | Performed by: STUDENT IN AN ORGANIZED HEALTH CARE EDUCATION/TRAINING PROGRAM

## 2025-01-30 PROCEDURE — 99215 OFFICE O/P EST HI 40 MIN: CPT | Mod: S$GLB,,, | Performed by: STUDENT IN AN ORGANIZED HEALTH CARE EDUCATION/TRAINING PROGRAM

## 2025-01-30 RX ORDER — LOSARTAN POTASSIUM 50 MG/1
50 TABLET ORAL DAILY
Qty: 90 TABLET | Refills: 3 | Status: SHIPPED | OUTPATIENT
Start: 2025-01-30 | End: 2026-01-30

## 2025-02-23 ENCOUNTER — RESULTS FOLLOW-UP (OUTPATIENT)
Dept: FAMILY MEDICINE | Facility: CLINIC | Age: 75
End: 2025-02-23

## 2025-02-23 NOTE — LETTER
February 24, 2025    Gama Huertas  [address] [facility] [address] [facility] [address]             [facility]  [address] [address]  Phone: [phone]  Fax: [phone] Dear Mr. Gama Huertas:    Below are the results from your recent visit:    Resulted Orders   CT Chest Lung Screening Low Dose    Narrative    EXAMINATION:  CT CHEST LUNG SCREENING LOW DOSE    CLINICAL HISTORY:  Lung cancer screening, >= 30 pk-yr current smoker (Age 55-80y); Chronic or unspecified gastric ulcer with hemorrhage    TECHNIQUE:  CT of the thorax was performed with low dose, lung screening protocol.  No contrast was administered.  Sagittal and coronal reconstructions were obtained.    All CT scans at this location are performed using dose modulation techniques as appropriate to a performed exam including the following: Automated exposure control; adjustment of the mA and/or kV  according to patient size.    COMPARISON:  08/28/2023    FINDINGS:  Lungs: Mild-to-moderate upper lobe predominant centrilobular emphysema.  No pulmonary nodules.    Pleura:   No effusion..    Heart and pericardium: Moderate cardiomegaly    Aorta and vasculature: Severe coronary calcification    Chest wall and skeletal structures: Unremarkable except age-appropriate degenerative changes.    Upper abdomen: Unremarkable.      Impression    Lung-RADS Category:  1 - Negative - Continue annual screening with LDCT in 12 months.    Clinically or potentially clinically significant non lung cancer finding:  None.    Prior Lung Cancer Modifier:  No history of prior lung cancer.      Electronically signed by: Hayden Sauceda  Date:    01/28/2025  Time:    13:46     Your results are normal.      Please don't hesitate to call our office if you have any questions or concerns.      Sincerely,        Richard Meléndez MD

## 2025-02-24 ENCOUNTER — TELEPHONE (OUTPATIENT)
Dept: CARDIOLOGY | Facility: HOSPITAL | Age: 75
End: 2025-02-24
Payer: MEDICARE

## 2025-02-27 ENCOUNTER — TELEPHONE (OUTPATIENT)
Dept: CARDIOLOGY | Facility: HOSPITAL | Age: 75
End: 2025-02-27
Payer: MEDICARE

## 2025-02-28 ENCOUNTER — TELEPHONE (OUTPATIENT)
Dept: CARDIOLOGY | Facility: HOSPITAL | Age: 75
End: 2025-02-28
Payer: MEDICARE

## 2025-02-28 DIAGNOSIS — I42.8 LV NON-COMPACTION CARDIOMYOPATHY: ICD-10-CM

## 2025-02-28 DIAGNOSIS — I42.1 HOCM (HYPERTROPHIC OBSTRUCTIVE CARDIOMYOPATHY): Primary | ICD-10-CM

## 2025-02-28 NOTE — TELEPHONE ENCOUNTER
I had the pleasure of discussing your upcoming Cardiac MRI scheduled for 05/13/2025 @ 7:00 at [facility] at [address] [address] [address] from the [facility]. Please aim to arrive at least 20-30 minutes before your scheduled time to allow staff time to check you in for the test as well as do their prep for the MRI to ensure this is done safely.    We discussed and ensured that you will arrive for the scheduled appointment and confirmed the absence of a pacemaker/defibrillator, the absence of a cerebral aneurysm or surgical clip, pump, nerve or brain stimulator, middle or inner ear prosthesis or other metal implant or being injured by a metal object (i.e. bullet, bb, shrapnel).    What is it? Imaging to look at your heart and its surrounding structures with and/or without the administration of Gadolinium contrast. Patients with pacemakers previously could not be scanned. However, new pacemakers have been designed to withstand MRI scans safely. Also, it has been determined that many of the older pacemakers can be scanned safely under the proper conditions.     Why are you having this test? An MRI is used to visualize various structures and tissue types within the body. Because it provides a high level of detail, an MRI is preferred for a wide variety of purposes. Your doctor has decided that an MRI is necessary to visualize the pathology present.    NO special preparation is required for this particular MRI. You can eat and take medications as you normally do.    The test should take about 40-50 minutes to complete. It is important to hold still for the exam or the pictures will be unreadable. If you are claustrophobic or have pain issues that may interfere with your ability to stay still, please discuss this with the ordering provider. You may or may not receive IV Gadolinium during the exam if this is indicated, so an IV will be placed. THIS IS NOT A DYE AND DOES NOT CONTAIN  IODINE. Your heart rate, blood pressure, and oxygen saturation will be continuously monitored throughout the exam. Rescue medications will be on hand in the event of any issues.      Thank you again for your time today. I can be reached at [phone], M-F from 7:30-4.

## 2025-03-03 ENCOUNTER — TELEPHONE (OUTPATIENT)
Dept: CARDIOLOGY | Facility: CLINIC | Age: 75
End: 2025-03-03
Payer: MEDICARE

## 2025-03-03 NOTE — TELEPHONE ENCOUNTER
Try calling patient a few after receiving message from [employer]. NO answer I left a few messages. After Deonte scheduled patient I placed the appointment in the mail.

## 2025-03-21 ENCOUNTER — TELEPHONE (OUTPATIENT)
Dept: FAMILY MEDICINE | Facility: CLINIC | Age: 75
End: 2025-03-21
Payer: MEDICARE

## 2025-03-24 DIAGNOSIS — Z00.00 ENCOUNTER FOR MEDICARE ANNUAL WELLNESS EXAM: ICD-10-CM

## 2025-06-06 ENCOUNTER — TELEPHONE (OUTPATIENT)
Dept: FAMILY MEDICINE | Facility: CLINIC | Age: 75
End: 2025-06-06
Payer: MEDICARE

## 2025-06-23 ENCOUNTER — HOSPITAL ENCOUNTER (OUTPATIENT)
Dept: RADIOLOGY | Facility: HOSPITAL | Age: 75
Discharge: HOME | End: 2025-06-23
Attending: FAMILY MEDICINE
Payer: MEDICARE

## 2025-06-23 ENCOUNTER — OFFICE VISIT (OUTPATIENT)
Dept: FAMILY MEDICINE | Facility: CLINIC | Age: 75
End: 2025-06-23
Payer: MEDICARE

## 2025-06-23 VITALS
BODY MASS INDEX: 19.67 KG/M2 | OXYGEN SATURATION: 95 % | DIASTOLIC BLOOD PRESSURE: 88 MMHG | WEIGHT: 122.38 LBS | SYSTOLIC BLOOD PRESSURE: 168 MMHG | HEIGHT: 66 IN | HEART RATE: 82 BPM | TEMPERATURE: 98 F

## 2025-06-23 DIAGNOSIS — R97.20 PSA ELEVATION: ICD-10-CM

## 2025-06-23 DIAGNOSIS — D50.9 MICROCYTIC ANEMIA: ICD-10-CM

## 2025-06-23 DIAGNOSIS — I10 PRIMARY HYPERTENSION: ICD-10-CM

## 2025-06-23 DIAGNOSIS — F10.20 ALCOHOL USE DISORDER, SEVERE, DEPENDENCE: ICD-10-CM

## 2025-06-23 DIAGNOSIS — F17.200 SMOKES: ICD-10-CM

## 2025-06-23 DIAGNOSIS — R55 SYNCOPE, UNSPECIFIED SYNCOPE TYPE: ICD-10-CM

## 2025-06-23 DIAGNOSIS — J43.9 PULMONARY EMPHYSEMA, UNSPECIFIED EMPHYSEMA TYPE: Chronic | ICD-10-CM

## 2025-06-23 DIAGNOSIS — J43.9 PULMONARY EMPHYSEMA, UNSPECIFIED EMPHYSEMA TYPE: Primary | Chronic | ICD-10-CM

## 2025-06-23 PROCEDURE — 1159F MED LIST DOCD IN RCRD: CPT | Mod: CPTII,S$GLB,, | Performed by: FAMILY MEDICINE

## 2025-06-23 PROCEDURE — 3008F BODY MASS INDEX DOCD: CPT | Mod: CPTII,S$GLB,, | Performed by: FAMILY MEDICINE

## 2025-06-23 PROCEDURE — 1126F AMNT PAIN NOTED NONE PRSNT: CPT | Mod: CPTII,S$GLB,, | Performed by: FAMILY MEDICINE

## 2025-06-23 PROCEDURE — 3077F SYST BP >= 140 MM HG: CPT | Mod: CPTII,S$GLB,, | Performed by: FAMILY MEDICINE

## 2025-06-23 PROCEDURE — 4010F ACE/ARB THERAPY RXD/TAKEN: CPT | Mod: CPTII,S$GLB,, | Performed by: FAMILY MEDICINE

## 2025-06-23 PROCEDURE — 1101F PT FALLS ASSESS-DOCD LE1/YR: CPT | Mod: CPTII,S$GLB,, | Performed by: FAMILY MEDICINE

## 2025-06-23 PROCEDURE — 71046 X-RAY EXAM CHEST 2 VIEWS: CPT | Mod: TC,FY,PN

## 2025-06-23 PROCEDURE — 1160F RVW MEDS BY RX/DR IN RCRD: CPT | Mod: CPTII,S$GLB,, | Performed by: FAMILY MEDICINE

## 2025-06-23 PROCEDURE — 3079F DIAST BP 80-89 MM HG: CPT | Mod: CPTII,S$GLB,, | Performed by: FAMILY MEDICINE

## 2025-06-23 PROCEDURE — 99215 OFFICE O/P EST HI 40 MIN: CPT | Mod: S$GLB,,, | Performed by: FAMILY MEDICINE

## 2025-06-23 PROCEDURE — 71046 X-RAY EXAM CHEST 2 VIEWS: CPT | Mod: 26,,, | Performed by: RADIOLOGY

## 2025-06-23 PROCEDURE — 3288F FALL RISK ASSESSMENT DOCD: CPT | Mod: CPTII,S$GLB,, | Performed by: FAMILY MEDICINE

## 2025-06-24 ENCOUNTER — TELEPHONE (OUTPATIENT)
Dept: CARDIOLOGY | Facility: HOSPITAL | Age: 75
End: 2025-06-24
Payer: MEDICARE

## 2025-06-24 ENCOUNTER — PATIENT MESSAGE (OUTPATIENT)
Dept: CARDIOLOGY | Facility: HOSPITAL | Age: 75
End: 2025-06-24
Payer: MEDICARE

## 2025-06-25 ENCOUNTER — PATIENT MESSAGE (OUTPATIENT)
Dept: CARDIOLOGY | Facility: HOSPITAL | Age: 75
End: 2025-06-25
Payer: MEDICARE

## 2025-06-25 NOTE — TELEPHONE ENCOUNTER
Spoke with the patient's cousin. Offered the date of 07/10/25 for his cMRI.  He is unable to do that date. He will not have transportation until after 07/15/25.

## 2025-06-29 NOTE — PROGRESS NOTES
"Subjective:      Patient ID: Gama Huertsa is a 74 y.o. male.    Chief Complaint: nursing home placement      Vitals:    25 1009   BP: (!) 168/88   Pulse: 82   Temp: 97.5 °F (36.4 °C)   TempSrc: Oral   SpO2: 95%   Weight: 55.5 kg (122 lb 5.7 oz)   Height: 5' 6" (1.676 m)        HPI   Here with cousin to complete form for NH admit at [facility]; TO called and are concerned he is not a candidate as he likes to go places; his friend that he lives with  and pt is in temporary apartment    Problem List  Problem List[1]     ALLERGIES: Review of patient's allergies indicates:  No Known Allergies    MEDS: Current Medications[2]      History:  Current Providers as of 2025  PCP: Richard Meléndez MD  Care Team Provider: Les Wakefield Jr., MD  Care Team Provider: Anil Lester LPN  Care Team Provider: Vince Vu LPN  Care Team Provider: Marielean Green LPN  Care Team Provider: Jazz Sanon  Encounter Provider: Richard Meléndez MD, starting on  12:00 AM  Referring Provider: not found, starting on  12:00 AM  Consulting Physician: Richard Meléndez MD, starting on  10:04 AM (Active)   Past Medical History:   Diagnosis Date    Gastric ulcer     Hypertension      Past Surgical History:   Procedure Laterality Date    COLONOSCOPY  3/11/14     repeat in 3 years dr wakefield     Social History[3]      Review of Systems   Constitutional:  Negative for appetite change, fatigue, fever and unexpected weight change.   HENT:  Negative for congestion, ear pain, sinus pressure and sore throat.    Eyes:  Negative for pain and visual disturbance.   Respiratory:  Negative for shortness of breath.    Cardiovascular:  Negative for chest pain.   Gastrointestinal:  Negative for abdominal pain, constipation and diarrhea.   Endocrine: Negative for polyuria.   Genitourinary:  Negative for difficulty urinating and frequency.   Musculoskeletal:  Negative for arthralgias, back " pain and myalgias.   Skin:  Negative for color change.   Allergic/Immunologic: Negative.    Neurological:  Negative for syncope, weakness and headaches.   Hematological:  Does not bruise/bleed easily.   Psychiatric/Behavioral:  Positive for agitation. Negative for behavioral problems, confusion, decreased concentration, dysphoric mood, hallucinations, self-injury, sleep disturbance and suicidal ideas. The patient is hyperactive. The patient is not nervous/anxious.    All other systems reviewed and are negative.    Objective:     Physical Exam  Vitals and nursing note reviewed.   Constitutional:       General: He is not in acute distress.     Appearance: He is well-developed. He is not diaphoretic.   HENT:      Head: Normocephalic and atraumatic.      Right Ear: External ear normal.      Left Ear: External ear normal.      Mouth/Throat:      Pharynx: No oropharyngeal exudate.   Eyes:      General: No scleral icterus.        Right eye: No discharge.         Left eye: No discharge.      Conjunctiva/sclera: Conjunctivae normal.      Pupils: Pupils are equal, round, and reactive to light.   Neck:      Thyroid: No thyromegaly.      Vascular: No JVD.      Trachea: No tracheal deviation.   Cardiovascular:      Rate and Rhythm: Normal rate and regular rhythm.      Heart sounds: No murmur heard.     No friction rub. No gallop.   Pulmonary:      Effort: Pulmonary effort is normal. No respiratory distress.      Breath sounds: Normal breath sounds. No stridor. No wheezing or rales.   Chest:      Chest wall: No tenderness.   Abdominal:      General: There is no distension.      Palpations: Abdomen is soft. There is no mass.      Tenderness: There is no abdominal tenderness. There is no guarding or rebound.   Musculoskeletal:         General: No tenderness. Normal range of motion.      Cervical back: Normal range of motion and neck supple.   Lymphadenopathy:      Cervical: No cervical adenopathy.   Skin:     General: Skin is warm  and dry.      Coloration: Skin is not pale.      Findings: No erythema or rash.   Neurological:      General: No focal deficit present.      Mental Status: He is alert and oriented to person, place, and time. Mental status is at baseline.      Cranial Nerves: No cranial nerve deficit.      Motor: No abnormal muscle tone.      Coordination: Coordination normal.      Deep Tendon Reflexes: Reflexes are normal and symmetric. Reflexes normal.   Psychiatric:         Mood and Affect: Mood normal.         Behavior: Behavior normal.         Thought Content: Thought content normal.         Judgment: Judgment normal.             Assessment:     1. Pulmonary emphysema, unspecified emphysema type    2. Primary hypertension    3. Alcohol use disorder, severe, dependence    4. PSA elevation    5. Microcytic anemia    6. Smokes    7. Syncope, unspecified syncope type      Plan:        Medication List            Accurate as of June 23, 2025 11:59 PM. If you have any questions, ask your nurse or doctor.                CONTINUE taking these medications      amLODIPine 10 MG tablet  Commonly known as: NORVASC  TAKE 1 TABLET BY MOUTH ONCE A DAY     COSOPT 22.3-6.8 mg/mL ophthalmic solution  Generic drug: dorzolamide-timolol 2-0.5%     EScitalopram oxalate 5 MG Tab  Commonly known as: LEXAPRO  TAKE 1 TABLET BY MOUTH ONCE A DAY     ferrous sulfate 325 mg (65 mg iron) Tab tablet  Commonly known as: FeroSuL  Take 1 tablet (325 mg total) by mouth once daily.     folic acid 1 MG tablet  Commonly known as: FOLVITE  TAKE 1 TABLET BY MOUTH ONCE A DAY     gabapentin 300 MG capsule  Commonly known as: NEURONTIN  TAKE 1 CAPSULE BY MOUTH TWO TIMES A DAY     latanoprost 0.005 % ophthalmic solution     losartan 50 MG tablet  Commonly known as: COZAAR  Take 1 tablet (50 mg total) by mouth once daily.     metoprolol tartrate 50 MG tablet  Commonly known as: LOPRESSOR  TAKE 1 TABLET BY MOUTH TWO TIMES A DAY     nicotine polacrilex 4 MG Gum  Commonly  known as: NICORETTE  Take 1 each (4 mg total) by mouth as needed.     pantoprazole 40 MG tablet  Commonly known as: PROTONIX  TAKE 1 TABLET BY MOUTH EVERY DAY            Pulmonary emphysema, unspecified emphysema type  -     CBC Auto Differential; Future; Expected date: 06/23/2025  -     Comprehensive Metabolic Panel; Future; Expected date: 06/23/2025  -     X-Ray Chest PA And Lateral; Future; Expected date: 06/23/2025  -     POCT COVID-19 Rapid Screening  -     Prealbumin; Future; Expected date: 06/23/2025    Primary hypertension  -     CBC Auto Differential; Future; Expected date: 06/23/2025  -     Comprehensive Metabolic Panel; Future; Expected date: 06/23/2025  -     X-Ray Chest PA And Lateral; Future; Expected date: 06/23/2025  -     POCT COVID-19 Rapid Screening  -     Prealbumin; Future; Expected date: 06/23/2025    Alcohol use disorder, severe, dependence  -     CBC Auto Differential; Future; Expected date: 06/23/2025  -     Comprehensive Metabolic Panel; Future; Expected date: 06/23/2025  -     X-Ray Chest PA And Lateral; Future; Expected date: 06/23/2025  -     POCT COVID-19 Rapid Screening  -     Prealbumin; Future; Expected date: 06/23/2025    PSA elevation  -     CBC Auto Differential; Future; Expected date: 06/23/2025  -     Comprehensive Metabolic Panel; Future; Expected date: 06/23/2025  -     X-Ray Chest PA And Lateral; Future; Expected date: 06/23/2025  -     POCT COVID-19 Rapid Screening  -     Prealbumin; Future; Expected date: 06/23/2025    Microcytic anemia  -     CBC Auto Differential; Future; Expected date: 06/23/2025  -     Comprehensive Metabolic Panel; Future; Expected date: 06/23/2025  -     X-Ray Chest PA And Lateral; Future; Expected date: 06/23/2025  -     POCT COVID-19 Rapid Screening  -     Prealbumin; Future; Expected date: 06/23/2025    Smokes  -     CBC Auto Differential; Future; Expected date: 06/23/2025  -     Comprehensive Metabolic Panel; Future; Expected date: 06/23/2025  -      X-Ray Chest PA And Lateral; Future; Expected date: 06/23/2025  -     POCT COVID-19 Rapid Screening  -     Prealbumin; Future; Expected date: 06/23/2025    Syncope, unspecified syncope type  -     CBC Auto Differential; Future; Expected date: 06/23/2025  -     Comprehensive Metabolic Panel; Future; Expected date: 06/23/2025  -     X-Ray Chest PA And Lateral; Future; Expected date: 06/23/2025  -     POCT COVID-19 Rapid Screening  -     Prealbumin; Future; Expected date: 06/23/2025      Form completed will wait on pre admit testing until we know he is going there       [1]   Patient Active Problem List  Diagnosis    Microcytic anemia    Hypertension    Anxiety    History of colon polyps    PSA elevation    Other nonthrombocytopenic purpura    Dementia without behavioral disturbance    Smokes    Alcohol abuse    Adjustment disorder with mixed anxiety and depressed mood    Alcohol use disorder, severe, dependence    Alcohol withdrawal syndrome without complication    Disorders of glucose transport, unspecified    Aortic atherosclerosis    Pulmonary emphysema, unspecified emphysema type    Syncope    History of noncompliance with medical treatment, presenting hazards to health    Elevated brain natriuretic peptide (BNP) level   [2]   Current Outpatient Medications:     amLODIPine (NORVASC) 10 MG tablet, TAKE 1 TABLET BY MOUTH ONCE A DAY, Disp: 90 tablet, Rfl: 3    COSOPT 22.3-6.8 mg/mL ophthalmic solution, Place 1 drop into both eyes 2 (two) times daily., Disp: , Rfl:     EScitalopram oxalate (LEXAPRO) 5 MG Tab, TAKE 1 TABLET BY MOUTH ONCE A DAY, Disp: 90 tablet, Rfl: 0    ferrous sulfate (FEROSUL) 325 mg (65 mg iron) Tab tablet, Take 1 tablet (325 mg total) by mouth once daily., Disp: 90 tablet, Rfl: 3    folic acid (FOLVITE) 1 MG tablet, TAKE 1 TABLET BY MOUTH ONCE A DAY, Disp: 100 tablet, Rfl: 11    gabapentin (NEURONTIN) 300 MG capsule, TAKE 1 CAPSULE BY MOUTH TWO TIMES A DAY, Disp: 180 capsule, Rfl: 1     "latanoprost 0.005 % ophthalmic solution, Place 1 drop into both eyes every evening., Disp: , Rfl:     losartan (COZAAR) 50 MG tablet, Take 1 tablet (50 mg total) by mouth once daily., Disp: 90 tablet, Rfl: 3    metoprolol tartrate (LOPRESSOR) 50 MG tablet, TAKE 1 TABLET BY MOUTH TWO TIMES A DAY, Disp: 180 tablet, Rfl: 3    nicotine polacrilex (NICORETTE) 4 MG Gum, Take 1 each (4 mg total) by mouth as needed., Disp: 200 each, Rfl: 11    pantoprazole (PROTONIX) 40 MG tablet, TAKE 1 TABLET BY MOUTH EVERY DAY, Disp: 90 tablet, Rfl: 3  [3]   Social History  Tobacco Use    Smoking status: Every Day     Current packs/day: 0.50     Types: Cigarettes     Passive exposure: Current    Smokeless tobacco: Never   Substance Use Topics    Alcohol use: Yes     Alcohol/week: 35.0 standard drinks of alcohol     Types: 35 Cans of beer per week     Comment: Pt was vague and reported "celebrating too much maybe"    Drug use: Never     " general

## 2025-06-30 ENCOUNTER — TELEPHONE (OUTPATIENT)
Dept: FAMILY MEDICINE | Facility: CLINIC | Age: 75
End: 2025-06-30
Payer: MEDICARE

## 2025-06-30 NOTE — TELEPHONE ENCOUNTER
Attempted pt's family member, Lakesha. LVM to call the office back to s/c nurse visit for POCT COVID test that has already been ordered.

## 2025-07-01 NOTE — TELEPHONE ENCOUNTER
Spoke to pt's relative Lakesha. She is not in town but she will call the pt to see about getting a ride and will call back for when he will be able to come.

## 2025-07-15 ENCOUNTER — TELEPHONE (OUTPATIENT)
Dept: FAMILY MEDICINE | Facility: CLINIC | Age: 75
End: 2025-07-15
Payer: MEDICARE

## 2025-07-15 NOTE — TELEPHONE ENCOUNTER
----- Message from Richard Meléndez MD sent at 7/13/2025  1:17 PM CDT -----  You have protein malnutrition; eat more protein  ----- Message -----  From: Lab, Background User  Sent: 6/23/2025  11:55 AM CDT  To: Richard Meléndez MD

## 2025-07-15 NOTE — TELEPHONE ENCOUNTER
Copied from CRM #[id number]. Topic: General Inquiry - Return Call  >> Jul 15, 2025 10:26 AM Perla wrote:  Type:  Patient Returning Call    Who Called: PT   Who Left Message for Patient: Nurse   Does the patient know what this is regarding?: Yes   Would the patient rather a call back or a response via [employer]? Call back   Best Call Back Number: [phone]  Additional Information:

## 2025-07-17 ENCOUNTER — OFFICE VISIT (OUTPATIENT)
Dept: FAMILY MEDICINE | Facility: CLINIC | Age: 75
End: 2025-07-17
Payer: MEDICARE

## 2025-07-17 VITALS
WEIGHT: 127.88 LBS | DIASTOLIC BLOOD PRESSURE: 66 MMHG | TEMPERATURE: 98 F | HEART RATE: 65 BPM | SYSTOLIC BLOOD PRESSURE: 168 MMHG | OXYGEN SATURATION: 97 % | BODY MASS INDEX: 20.55 KG/M2 | HEIGHT: 66 IN

## 2025-07-17 DIAGNOSIS — I10 PRIMARY HYPERTENSION: ICD-10-CM

## 2025-07-17 DIAGNOSIS — I10 ESSENTIAL HYPERTENSION: ICD-10-CM

## 2025-07-17 DIAGNOSIS — K25.4 CHRONIC GASTRIC ULCER WITH HEMORRHAGE: ICD-10-CM

## 2025-07-17 DIAGNOSIS — F17.200 SMOKES: ICD-10-CM

## 2025-07-17 DIAGNOSIS — Z91.199 HISTORY OF NONCOMPLIANCE WITH MEDICAL TREATMENT, PRESENTING HAZARDS TO HEALTH: ICD-10-CM

## 2025-07-17 DIAGNOSIS — F43.23 ADJUSTMENT DISORDER WITH MIXED ANXIETY AND DEPRESSED MOOD: Primary | ICD-10-CM

## 2025-07-17 DIAGNOSIS — Z86.0100 HISTORY OF COLON POLYPS: ICD-10-CM

## 2025-07-17 DIAGNOSIS — K21.9 GASTROESOPHAGEAL REFLUX DISEASE, UNSPECIFIED WHETHER ESOPHAGITIS PRESENT: ICD-10-CM

## 2025-07-17 DIAGNOSIS — I70.0 AORTIC ATHEROSCLEROSIS: ICD-10-CM

## 2025-07-17 DIAGNOSIS — R63.4 WEIGHT LOSS: ICD-10-CM

## 2025-07-17 DIAGNOSIS — F10.11 HISTORY OF ALCOHOL ABUSE: ICD-10-CM

## 2025-07-17 DIAGNOSIS — D50.0 IRON DEFICIENCY ANEMIA DUE TO CHRONIC BLOOD LOSS: ICD-10-CM

## 2025-07-17 DIAGNOSIS — D50.9 MICROCYTIC ANEMIA: ICD-10-CM

## 2025-07-17 DIAGNOSIS — R97.20 PSA ELEVATION: ICD-10-CM

## 2025-07-17 DIAGNOSIS — E46 PROTEIN MALNUTRITION: ICD-10-CM

## 2025-07-17 DIAGNOSIS — J43.9 PULMONARY EMPHYSEMA, UNSPECIFIED EMPHYSEMA TYPE: Chronic | ICD-10-CM

## 2025-07-17 DIAGNOSIS — Z12.11 COLON CANCER SCREENING: ICD-10-CM

## 2025-07-17 PROBLEM — R55 SYNCOPE: Status: RESOLVED | Noted: 2024-05-09 | Resolved: 2025-07-17

## 2025-07-17 PROBLEM — F10.10 ALCOHOL ABUSE: Status: RESOLVED | Noted: 2023-03-25 | Resolved: 2025-07-17

## 2025-07-17 PROBLEM — F10.930 ALCOHOL WITHDRAWAL SYNDROME WITHOUT COMPLICATION: Status: RESOLVED | Noted: 2023-03-27 | Resolved: 2025-07-17

## 2025-07-17 PROBLEM — F03.90 DEMENTIA WITHOUT BEHAVIORAL DISTURBANCE: Status: RESOLVED | Noted: 2023-02-23 | Resolved: 2025-07-17

## 2025-07-17 PROCEDURE — 3288F FALL RISK ASSESSMENT DOCD: CPT | Mod: CPTII,S$GLB,, | Performed by: FAMILY MEDICINE

## 2025-07-17 PROCEDURE — 99215 OFFICE O/P EST HI 40 MIN: CPT | Mod: S$GLB,,, | Performed by: FAMILY MEDICINE

## 2025-07-17 PROCEDURE — 3078F DIAST BP <80 MM HG: CPT | Mod: CPTII,S$GLB,, | Performed by: FAMILY MEDICINE

## 2025-07-17 PROCEDURE — 1101F PT FALLS ASSESS-DOCD LE1/YR: CPT | Mod: CPTII,S$GLB,, | Performed by: FAMILY MEDICINE

## 2025-07-17 PROCEDURE — 3077F SYST BP >= 140 MM HG: CPT | Mod: CPTII,S$GLB,, | Performed by: FAMILY MEDICINE

## 2025-07-17 PROCEDURE — 1160F RVW MEDS BY RX/DR IN RCRD: CPT | Mod: CPTII,S$GLB,, | Performed by: FAMILY MEDICINE

## 2025-07-17 PROCEDURE — 1126F AMNT PAIN NOTED NONE PRSNT: CPT | Mod: CPTII,S$GLB,, | Performed by: FAMILY MEDICINE

## 2025-07-17 PROCEDURE — 3008F BODY MASS INDEX DOCD: CPT | Mod: CPTII,S$GLB,, | Performed by: FAMILY MEDICINE

## 2025-07-17 PROCEDURE — 4010F ACE/ARB THERAPY RXD/TAKEN: CPT | Mod: CPTII,S$GLB,, | Performed by: FAMILY MEDICINE

## 2025-07-17 PROCEDURE — 1159F MED LIST DOCD IN RCRD: CPT | Mod: CPTII,S$GLB,, | Performed by: FAMILY MEDICINE

## 2025-07-17 RX ORDER — AMLODIPINE BESYLATE 10 MG/1
TABLET ORAL
Qty: 90 TABLET | Refills: 3 | Status: SHIPPED | OUTPATIENT
Start: 2025-07-17

## 2025-07-17 NOTE — PROGRESS NOTES
"I spent a total of 44 minutes on the day of the visit.This includes face to face time and non-face to face time preparing to see the patient (eg, review of tests), obtaining and/or reviewing separately obtained history, documenting clinical information in the electronic or other health record, independently interpreting results and communicating results to the patient/family/caregiver, or care coordinator.Subjective:      Patient ID: Gama Huertas is a 74 y.o. male.    Chief Complaint: Follow-up (6 mon)      Vitals:    07/17/25 1552   BP: (!) 168/66   Pulse: 65   Temp: 97.8 °F (36.6 °C)   TempSrc: Oral   SpO2: 97%   Weight: 58 kg (127 lb 13.9 oz)   Height: 5' 6" (1.676 m)        HPI   ***    Problem List  Problem List[1]     ALLERGIES: Review of patient's allergies indicates:  No Known Allergies    MEDS: Current Medications[2]      History:  Current Providers as of 7/17/2025  PCP: Richard Meléndez MD  Care Team Provider: Les Wakefield Jr., MD  Care Team Provider: Anil Lester LPN  Care Team Provider: Vince Vu LPN  Care Team Provider: Marielena Green LPN  Care Team Provider: Jazz Sanon  Encounter Provider: Richard Meléndez MD, starting on Thu Jul 17, 2025 12:00 AM  Referring Provider: not found, starting on Thu Jul 17, 2025 12:00 AM  Consulting Physician: Richard Meléndez MD, starting on Thu Jul 17, 2025  3:51 PM (Active)   Past Medical History:   Diagnosis Date    Gastric ulcer     Hypertension      Past Surgical History:   Procedure Laterality Date    COLONOSCOPY  3/11/14     repeat in 3 years dr wakefield     Social History[3]      Review of Systems   Constitutional:  Negative for appetite change, fatigue, fever and unexpected weight change.   HENT:  Negative for congestion, ear pain, sinus pressure and sore throat.    Eyes:  Negative for pain and visual disturbance.   Respiratory:  Negative for shortness of breath.    Cardiovascular:  Positive for chest pain.        Acid reflux "   Gastrointestinal:  Negative for abdominal pain, constipation and diarrhea.   Endocrine: Negative for polyuria.   Genitourinary:  Negative for difficulty urinating and frequency.   Musculoskeletal:  Negative for arthralgias, back pain and myalgias.   Skin:  Negative for color change.   Allergic/Immunologic: Negative.    Neurological:  Positive for headaches. Negative for syncope and weakness.   Hematological:  Does not bruise/bleed easily.   Psychiatric/Behavioral:  Negative for dysphoric mood and suicidal ideas. The patient is not nervous/anxious.    All other systems reviewed and are negative.    Objective:     Physical Exam  Vitals and nursing note reviewed.   Constitutional:       General: He is not in acute distress.     Appearance: He is well-developed. He is not diaphoretic.   HENT:      Head: Normocephalic and atraumatic.      Right Ear: External ear normal.      Left Ear: External ear normal.      Mouth/Throat:      Pharynx: No oropharyngeal exudate.   Eyes:      General: No scleral icterus.        Right eye: No discharge.         Left eye: No discharge.      Conjunctiva/sclera: Conjunctivae normal.      Pupils: Pupils are equal, round, and reactive to light.   Neck:      Thyroid: No thyromegaly.      Vascular: No JVD.      Trachea: No tracheal deviation.   Cardiovascular:      Rate and Rhythm: Normal rate and regular rhythm.      Heart sounds: No murmur heard.     No friction rub. No gallop.   Pulmonary:      Effort: Pulmonary effort is normal. No respiratory distress.      Breath sounds: Normal breath sounds. No stridor. No wheezing or rales.   Chest:      Chest wall: No tenderness.   Abdominal:      General: There is no distension.      Palpations: Abdomen is soft. There is no mass.      Tenderness: There is no abdominal tenderness. There is no guarding or rebound.   Musculoskeletal:         General: No tenderness. Normal range of motion.      Cervical back: Normal range of motion and neck supple.    Lymphadenopathy:      Cervical: No cervical adenopathy.   Skin:     General: Skin is warm and dry.      Coloration: Skin is not pale.      Findings: No erythema or rash.   Neurological:      Mental Status: He is alert and oriented to person, place, and time.      Cranial Nerves: No cranial nerve deficit.      Motor: No abnormal muscle tone.      Coordination: Coordination normal.      Deep Tendon Reflexes: Reflexes are normal and symmetric. Reflexes normal.   Psychiatric:         Judgment: Judgment normal.             Assessment:     1. Adjustment disorder with mixed anxiety and depressed mood    2. History of alcohol abuse    3. Pulmonary emphysema, unspecified emphysema type    4. Aortic atherosclerosis    5. Primary hypertension    6. History of colon polyps    7. History of noncompliance with medical treatment, presenting hazards to health    8. Smokes    9. PSA elevation    10. Protein malnutrition    11. Microcytic anemia    12. Essential hypertension    13. Gastroesophageal reflux disease, unspecified whether esophagitis present    14. Weight loss    15. Chronic gastric ulcer with hemorrhage    16. Iron deficiency anemia due to chronic blood loss    17. Colon cancer screening      Plan:        Medication List            Accurate as of July 17, 2025 11:59 PM. If you have any questions, ask your nurse or doctor.                CONTINUE taking these medications      amLODIPine 10 MG tablet  Commonly known as: NORVASC  TAKE 1 TABLET BY MOUTH ONCE A DAY     COSOPT 22.3-6.8 mg/mL ophthalmic solution  Generic drug: dorzolamide-timolol 2-0.5%     EScitalopram oxalate 5 MG Tab  Commonly known as: LEXAPRO  TAKE 1 TABLET BY MOUTH ONCE A DAY     ferrous sulfate 325 mg (65 mg iron) Tab tablet  Commonly known as: FeroSuL  Take 1 tablet (325 mg total) by mouth once daily.     folic acid 1 MG tablet  Commonly known as: FOLVITE  TAKE 1 TABLET BY MOUTH ONCE A DAY     gabapentin 300 MG capsule  Commonly known as:  NEURONTIN  TAKE 1 CAPSULE BY MOUTH TWO TIMES A DAY     latanoprost 0.005 % ophthalmic solution     losartan 50 MG tablet  Commonly known as: COZAAR  Take 1 tablet (50 mg total) by mouth once daily.     metoprolol tartrate 50 MG tablet  Commonly known as: LOPRESSOR  TAKE 1 TABLET BY MOUTH TWO TIMES A DAY     nicotine polacrilex 4 MG Gum  Commonly known as: NICORETTE  Take 1 each (4 mg total) by mouth as needed.     pantoprazole 40 MG tablet  Commonly known as: PROTONIX  TAKE 1 TABLET BY MOUTH EVERY DAY               Where to Get Your Medications        These medications were sent to [id number] - [place] [id number] [facility]  [address] [place] [id number]      Phone: [phone]   amLODIPine 10 MG tablet       Adjustment disorder with mixed anxiety and depressed mood    History of alcohol abuse    Pulmonary emphysema, unspecified emphysema type    Aortic atherosclerosis    Primary hypertension    History of colon polyps  -     amLODIPine (NORVASC) 10 MG tablet; TAKE 1 TABLET BY MOUTH ONCE A DAY  Dispense: 90 tablet; Refill: 3    History of noncompliance with medical treatment, presenting hazards to health    Smokes    PSA elevation  -     amLODIPine (NORVASC) 10 MG tablet; TAKE 1 TABLET BY MOUTH ONCE A DAY  Dispense: 90 tablet; Refill: 3    Protein malnutrition    Microcytic anemia  -     amLODIPine (NORVASC) 10 MG tablet; TAKE 1 TABLET BY MOUTH ONCE A DAY  Dispense: 90 tablet; Refill: 3    Essential hypertension  -     amLODIPine (NORVASC) 10 MG tablet; TAKE 1 TABLET BY MOUTH ONCE A DAY  Dispense: 90 tablet; Refill: 3    Gastroesophageal reflux disease, unspecified whether esophagitis present  -     amLODIPine (NORVASC) 10 MG tablet; TAKE 1 TABLET BY MOUTH ONCE A DAY  Dispense: 90 tablet; Refill: 3    Weight loss  -     amLODIPine (NORVASC) 10 MG tablet; TAKE 1 TABLET BY MOUTH ONCE A DAY  Dispense: 90 tablet; Refill: 3    Chronic gastric ulcer with hemorrhage  -     amLODIPine (NORVASC)  10 MG tablet; TAKE 1 TABLET BY MOUTH ONCE A DAY  Dispense: 90 tablet; Refill: 3    Iron deficiency anemia due to chronic blood loss  -     amLODIPine (NORVASC) 10 MG tablet; TAKE 1 TABLET BY MOUTH ONCE A DAY  Dispense: 90 tablet; Refill: 3    Colon cancer screening  -     Fecal Immunochemical Test (iFOBT); Future; Expected date: 07/31/2025             [1]   Patient Active Problem List  Diagnosis    Microcytic anemia    Hypertension    Anxiety    History of colon polyps    PSA elevation    Other nonthrombocytopenic purpura    Smokes    Adjustment disorder with mixed anxiety and depressed mood    Alcohol use disorder, severe, dependence    Disorders of glucose transport, unspecified    Aortic atherosclerosis    Pulmonary emphysema, unspecified emphysema type    History of noncompliance with medical treatment, presenting hazards to health    Elevated brain natriuretic peptide (BNP) level   [2]   Current Outpatient Medications:     COSOPT 22.3-6.8 mg/mL ophthalmic solution, Place 1 drop into both eyes 2 (two) times daily., Disp: , Rfl:     EScitalopram oxalate (LEXAPRO) 5 MG Tab, TAKE 1 TABLET BY MOUTH ONCE A DAY, Disp: 90 tablet, Rfl: 0    ferrous sulfate (FEROSUL) 325 mg (65 mg iron) Tab tablet, Take 1 tablet (325 mg total) by mouth once daily., Disp: 90 tablet, Rfl: 3    folic acid (FOLVITE) 1 MG tablet, TAKE 1 TABLET BY MOUTH ONCE A DAY, Disp: 100 tablet, Rfl: 11    gabapentin (NEURONTIN) 300 MG capsule, TAKE 1 CAPSULE BY MOUTH TWO TIMES A DAY, Disp: 180 capsule, Rfl: 1    latanoprost 0.005 % ophthalmic solution, Place 1 drop into both eyes every evening., Disp: , Rfl:     losartan (COZAAR) 50 MG tablet, Take 1 tablet (50 mg total) by mouth once daily., Disp: 90 tablet, Rfl: 3    metoprolol tartrate (LOPRESSOR) 50 MG tablet, TAKE 1 TABLET BY MOUTH TWO TIMES A DAY, Disp: 180 tablet, Rfl: 3    nicotine polacrilex (NICORETTE) 4 MG Gum, Take 1 each (4 mg total) by mouth as needed., Disp: 200 each, Rfl: 11     "pantoprazole (PROTONIX) 40 MG tablet, TAKE 1 TABLET BY MOUTH EVERY DAY, Disp: 90 tablet, Rfl: 3    amLODIPine (NORVASC) 10 MG tablet, TAKE 1 TABLET BY MOUTH ONCE A DAY, Disp: 90 tablet, Rfl: 3  [3]   Social History  Tobacco Use    Smoking status: Every Day     Current packs/day: 0.50     Types: Cigarettes     Passive exposure: Current    Smokeless tobacco: Never   Substance Use Topics    Alcohol use: Yes     Alcohol/week: 35.0 standard drinks of alcohol     Types: 35 Cans of beer per week     Comment: Pt was vague and reported "celebrating too much maybe"    Drug use: Never     "

## 2025-07-22 NOTE — PROGRESS NOTES
"Subjective:      Patient ID: Gama Huertas is a 74 y.o. male.    Chief Complaint: Follow-up (6 mon)      Vitals:    07/17/25 1552   BP: (!) 168/66   Pulse: 65   Temp: 97.8 °F (36.6 °C)   TempSrc: Oral   SpO2: 97%   Weight: 58 kg (127 lb 13.9 oz)   Height: 5' 6" (1.676 m)        HPI   History of Present Illness    CHIEF COMPLAINT:  Patient presents today for follow up    CURRENT SYMPTOMS:  He reports acid reflux with associated dizziness. He experiences a pressure sensation on the side of his head in the middle region, which he distinguishes from a headache. The pressure temporarily resolves with BC powder. He also notes poor coordination. He denies chest pain, shortness of breath, nausea, vomiting, and fainting.    SOCIAL HISTORY:  He resides at [facility] which provides breakfast and lunch five days per week. 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 He currently smokes cigarettes but reports not inhaling.          Review of Systems   Constitutional:  Negative for appetite change, fatigue, fever and unexpected weight change.   HENT:  Negative for congestion, ear pain, sinus pressure and sore throat.    Eyes:  Negative for pain and visual disturbance.   Respiratory:  Negative for shortness of breath.    Cardiovascular:  Negative for chest pain.   Gastrointestinal:  Negative for abdominal pain, constipation and diarrhea.   Endocrine: Negative for polyuria.   Genitourinary:  Negative for difficulty urinating and frequency.   Musculoskeletal:  Negative for arthralgias, back pain and myalgias.   Skin:  Negative for color change.   Allergic/Immunologic: Negative.    Neurological:  Negative for syncope, weakness and headaches.   Hematological:  Does not bruise/bleed easily.   Psychiatric/Behavioral:  Negative for dysphoric mood and suicidal ideas. The patient is not nervous/anxious.    All other systems reviewed and are negative.   "     Problem List  Problem List[1]     ALLERGIES: Review of patient's allergies indicates:  No Known Allergies    MEDS: Current Medications[2]      History:  Current Providers as of 7/17/2025  PCP: Richard Meléndez MD  Care Team Provider: Les Wakefield Jr., MD  Care Team Provider: Anil Lester LPN  Care Team Provider: Vince Vu LPN  Care Team Provider: Marielena Green LPN  Care Team Provider: Jazz Sanon  Encounter Provider: Richard Meléndez MD, starting on Thu Jul 17, 2025 12:00 AM  Referring Provider: not found, starting on Thu Jul 17, 2025 12:00 AM  Consulting Physician: Richard Meléndez MD, starting on Thu Jul 17, 2025  3:51 PM (Active)   Past Medical History:   Diagnosis Date    Gastric ulcer     Hypertension      Past Surgical History:   Procedure Laterality Date    COLONOSCOPY  3/11/14     repeat in 3 years dr wakefield     Social History[3]        Objective:     Physical Exam  Vitals and nursing note reviewed.   Constitutional:       General: He is not in acute distress.     Appearance: He is well-developed. He is not diaphoretic.   HENT:      Head: Normocephalic and atraumatic.      Right Ear: External ear normal.      Left Ear: External ear normal.      Mouth/Throat:      Pharynx: No oropharyngeal exudate.   Eyes:      General: No scleral icterus.        Right eye: No discharge.         Left eye: No discharge.      Conjunctiva/sclera: Conjunctivae normal.      Pupils: Pupils are equal, round, and reactive to light.   Neck:      Thyroid: No thyromegaly.      Vascular: No JVD.      Trachea: No tracheal deviation.   Cardiovascular:      Rate and Rhythm: Normal rate and regular rhythm.      Heart sounds: No murmur heard.     No friction rub. No gallop.   Pulmonary:      Effort: Pulmonary effort is normal. No respiratory distress.      Breath sounds: Normal breath sounds. No stridor. No wheezing or rales.   Chest:      Chest wall: No tenderness.   Abdominal:      General: There is no  distension.      Palpations: Abdomen is soft. There is no mass.      Tenderness: There is no abdominal tenderness. There is no guarding or rebound.   Musculoskeletal:         General: No tenderness. Normal range of motion.      Cervical back: Normal range of motion and neck supple.   Lymphadenopathy:      Cervical: No cervical adenopathy.   Skin:     General: Skin is warm and dry.      Coloration: Skin is not pale.      Findings: No erythema or rash.   Neurological:      General: No focal deficit present.      Mental Status: He is alert and oriented to person, place, and time. Mental status is at baseline.      Cranial Nerves: No cranial nerve deficit.      Motor: No abnormal muscle tone.      Coordination: Coordination normal.      Deep Tendon Reflexes: Reflexes are normal and symmetric. Reflexes normal.   Psychiatric:         Behavior: Behavior normal.         Thought Content: Thought content normal.         Judgment: Judgment normal.              Assessment:     1. Adjustment disorder with mixed anxiety and depressed mood    2. History of alcohol abuse    3. Pulmonary emphysema, unspecified emphysema type    4. Aortic atherosclerosis    5. Primary hypertension    6. History of colon polyps    7. History of noncompliance with medical treatment, presenting hazards to health    8. Smokes    9. PSA elevation    10. Protein malnutrition    11. Microcytic anemia    12. Essential hypertension    13. Gastroesophageal reflux disease, unspecified whether esophagitis present    14. Weight loss    15. Chronic gastric ulcer with hemorrhage    16. Iron deficiency anemia due to chronic blood loss    17. Colon cancer screening      Plan:        Medication List            Accurate as of July 17, 2025 11:59 PM. If you have any questions, ask your nurse or doctor.                CONTINUE taking these medications      amLODIPine 10 MG tablet  Commonly known as: NORVASC  TAKE 1 TABLET BY MOUTH ONCE A DAY     COSOPT 22.3-6.8 mg/mL  ophthalmic solution  Generic drug: dorzolamide-timolol 2-0.5%     EScitalopram oxalate 5 MG Tab  Commonly known as: LEXAPRO  TAKE 1 TABLET BY MOUTH ONCE A DAY     ferrous sulfate 325 mg (65 mg iron) Tab tablet  Commonly known as: FeroSuL  Take 1 tablet (325 mg total) by mouth once daily.     folic acid 1 MG tablet  Commonly known as: FOLVITE  TAKE 1 TABLET BY MOUTH ONCE A DAY     gabapentin 300 MG capsule  Commonly known as: NEURONTIN  TAKE 1 CAPSULE BY MOUTH TWO TIMES A DAY     latanoprost 0.005 % ophthalmic solution     losartan 50 MG tablet  Commonly known as: COZAAR  Take 1 tablet (50 mg total) by mouth once daily.     metoprolol tartrate 50 MG tablet  Commonly known as: LOPRESSOR  TAKE 1 TABLET BY MOUTH TWO TIMES A DAY     nicotine polacrilex 4 MG Gum  Commonly known as: NICORETTE  Take 1 each (4 mg total) by mouth as needed.     pantoprazole 40 MG tablet  Commonly known as: PROTONIX  TAKE 1 TABLET BY MOUTH EVERY DAY               Where to Get Your Medications        These medications were sent to [id number] [place], [place]  [address] [place] [id number]      Phone: [phone]   amLODIPine 10 MG tablet         Assessment & Plan    I10 Essential (primary) hypertension  K21.9 Gastro-esophageal reflux disease without esophagitis  E46 Unspecified protein-calorie malnutrition  R42 Dizziness and giddiness  R27.9 Unspecified lack of coordination  F17.210 Nicotine dependence, cigarettes, uncomplicated  Z72.0 Tobacco use  Z86.59 Personal history of other mental and behavioral disorders    HYPERTENSION:  - Refilled amlodipine for blood pressure management.  - Patient has been taking blood pressure medication but is currently out of amlodipine.  - Advised the patient to bring all medications for review to ensure proper management of hypertension.    GASTROESOPHAGEAL REFLUX DISEASE:  - Patient experiences acid reflux, especially when eating, and reports associated  dizziness.  - Currently uses liquid antacid (Equate from [employer]) for acid reflux management.    PROTEIN-CALORIE MALNUTRITION:  - Patient is malnourished with low pre-albumin levels.  - Explained importance of protein intake and discussed dietary sources including chicken, meat, seafood, beans, dairy products, and eggs.  - Advised increasing protein consumption to address malnutrition.  - Reviewed problem list, noting absence of Alzheimer's and presence of protein malnutrition.    DIZZINESS AND LACK OF COORDINATION:  - Patient reports dizziness, described as a feeling of pressure in the head rather than a headache.  - Uses BC powder to relieve head pressure and dizziness.  - Reports low coordination at times but denies passing out, losing consciousness, or falling to the ground.    NICOTINE DEPENDENCE AND TOBACCO USE:  - Evaluated current smoking habits.  - Patient continues to smoke cigarettes but claims not to inhale, indicating ongoing nicotine dependence despite modified smoking behavior.    HISTORY OF MENTAL AND BEHAVIORAL DISORDERS:  - Patient has a history of excessive alcohol consumption but has quit drinking, indicating a resolved behavioral disorder.    FOLLOW-UP AND SCREENING:  - Ordered FIT test for colon cancer screening.  - Patient to bring all current medications to next visit for comprehensive review.  - Noted upcoming cardiac MRI scheduled at [facility].            This note was generated with the assistance of ambient listening technology. Verbal consent was obtained by the patient and accompanying visitor(s) for the recording of patient appointment to facilitate this note. I attest to having reviewed and edited the generated note for accuracy, though some syntax or spelling errors may persist. Please contact the author of this note for any clarification.              [1]   Patient Active Problem List  Diagnosis    Microcytic anemia    Hypertension    Anxiety    History of colon polyps    PSA  elevation    Other nonthrombocytopenic purpura    Smokes    Adjustment disorder with mixed anxiety and depressed mood    Alcohol use disorder, severe, dependence    Disorders of glucose transport, unspecified    Aortic atherosclerosis    Pulmonary emphysema, unspecified emphysema type    History of noncompliance with medical treatment, presenting hazards to health    Elevated brain natriuretic peptide (BNP) level   [2]   Current Outpatient Medications:     COSOPT 22.3-6.8 mg/mL ophthalmic solution, Place 1 drop into both eyes 2 (two) times daily., Disp: , Rfl:     EScitalopram oxalate (LEXAPRO) 5 MG Tab, TAKE 1 TABLET BY MOUTH ONCE A DAY, Disp: 90 tablet, Rfl: 0    ferrous sulfate (FEROSUL) 325 mg (65 mg iron) Tab tablet, Take 1 tablet (325 mg total) by mouth once daily., Disp: 90 tablet, Rfl: 3    folic acid (FOLVITE) 1 MG tablet, TAKE 1 TABLET BY MOUTH ONCE A DAY, Disp: 100 tablet, Rfl: 11    gabapentin (NEURONTIN) 300 MG capsule, TAKE 1 CAPSULE BY MOUTH TWO TIMES A DAY, Disp: 180 capsule, Rfl: 1    latanoprost 0.005 % ophthalmic solution, Place 1 drop into both eyes every evening., Disp: , Rfl:     losartan (COZAAR) 50 MG tablet, Take 1 tablet (50 mg total) by mouth once daily., Disp: 90 tablet, Rfl: 3    metoprolol tartrate (LOPRESSOR) 50 MG tablet, TAKE 1 TABLET BY MOUTH TWO TIMES A DAY, Disp: 180 tablet, Rfl: 3    nicotine polacrilex (NICORETTE) 4 MG Gum, Take 1 each (4 mg total) by mouth as needed., Disp: 200 each, Rfl: 11    pantoprazole (PROTONIX) 40 MG tablet, TAKE 1 TABLET BY MOUTH EVERY DAY, Disp: 90 tablet, Rfl: 3    amLODIPine (NORVASC) 10 MG tablet, TAKE 1 TABLET BY MOUTH ONCE A DAY, Disp: 90 tablet, Rfl: 3  [3]   Social History  Tobacco Use    Smoking status: Every Day     Current packs/day: 0.50     Types: Cigarettes     Passive exposure: Current    Smokeless tobacco: Never   Substance Use Topics    Alcohol use: Yes     Alcohol/week: 35.0 standard drinks of alcohol     Types: 35 Cans of beer per  "week     Comment: Pt was vague and reported "celebrating too much maybe"    Drug use: Never     "

## 2025-07-24 ENCOUNTER — CLINICAL SUPPORT (OUTPATIENT)
Dept: FAMILY MEDICINE | Facility: CLINIC | Age: 75
End: 2025-07-24
Payer: MEDICARE

## 2025-07-24 ENCOUNTER — TELEPHONE (OUTPATIENT)
Dept: FAMILY MEDICINE | Facility: CLINIC | Age: 75
End: 2025-07-24

## 2025-07-24 VITALS — OXYGEN SATURATION: 98 % | HEART RATE: 68 BPM | DIASTOLIC BLOOD PRESSURE: 70 MMHG | SYSTOLIC BLOOD PRESSURE: 166 MMHG

## 2025-07-24 DIAGNOSIS — I10 PRIMARY HYPERTENSION: Primary | ICD-10-CM

## 2025-07-24 DIAGNOSIS — I10 PRIMARY HYPERTENSION: ICD-10-CM

## 2025-07-24 NOTE — TELEPHONE ENCOUNTER
Pt reported to clinic for bp check. pt has been taking amlodipine 10 mg 1 tablet daily, losartan 50 mg 1 tablet daily and metoprolol 50 mg 1 tablet bid. Pt is asymptomatic. Pt is scheduled to return in 2 weeks for bp check. [facility] uses [facility]. Please advise.     1015  BP: 170/70  P: 68  O2: 98    1030  BP: 166/70

## 2025-07-24 NOTE — PROGRESS NOTES
Gama Huertas 74 y.o. male is here today for Blood Pressure check.   History of HTN yes.    Review of patient's allergies indicates:  No Known Allergies  Creatinine   Date Value Ref Range Status   06/23/2025 1.0 0.5 - 1.4 mg/dL Final     Sodium   Date Value Ref Range Status   06/23/2025 146 (H) 136 - 145 mmol/L Final   07/26/2024 141 136 - 145 mmol/L Final     Potassium   Date Value Ref Range Status   06/23/2025 4.7 3.5 - 5.1 mmol/L Final   07/26/2024 4.2 3.5 - 5.1 mmol/L Final   ]  Patient verifies taking blood pressure medications on a regular basis at the same time of the day.   Current Medications[1]  Does patient have record of home blood pressure readings no.  Last dose of blood pressure medication was taken at 0900 7/24/25.  Patient is asymptomatic.       BP:170/70, Pulse: 68 .    Blood pressure reading after 15 minutes was 166/70,   Dr. Meléndez notified.            [1]   Current Outpatient Medications:     amLODIPine (NORVASC) 10 MG tablet, TAKE 1 TABLET BY MOUTH ONCE A DAY, Disp: 90 tablet, Rfl: 3    COSOPT 22.3-6.8 mg/mL ophthalmic solution, Place 1 drop into both eyes 2 (two) times daily., Disp: , Rfl:     EScitalopram oxalate (LEXAPRO) 5 MG Tab, TAKE 1 TABLET BY MOUTH ONCE A DAY, Disp: 90 tablet, Rfl: 0    ferrous sulfate (FEROSUL) 325 mg (65 mg iron) Tab tablet, Take 1 tablet (325 mg total) by mouth once daily., Disp: 90 tablet, Rfl: 3    folic acid (FOLVITE) 1 MG tablet, TAKE 1 TABLET BY MOUTH ONCE A DAY, Disp: 100 tablet, Rfl: 11    gabapentin (NEURONTIN) 300 MG capsule, TAKE 1 CAPSULE BY MOUTH TWO TIMES A DAY, Disp: 180 capsule, Rfl: 1    latanoprost 0.005 % ophthalmic solution, Place 1 drop into both eyes every evening., Disp: , Rfl:     losartan (COZAAR) 50 MG tablet, Take 1 tablet (50 mg total) by mouth once daily., Disp: 90 tablet, Rfl: 3    metoprolol tartrate (LOPRESSOR) 50 MG tablet, TAKE 1 TABLET BY MOUTH TWO TIMES A DAY, Disp: 180 tablet, Rfl: 3    nicotine polacrilex (NICORETTE) 4 MG Gum,  Take 1 each (4 mg total) by mouth as needed., Disp: 200 each, Rfl: 11    pantoprazole (PROTONIX) 40 MG tablet, TAKE 1 TABLET BY MOUTH EVERY DAY, Disp: 90 tablet, Rfl: 3

## 2025-07-25 RX ORDER — HYDROCHLOROTHIAZIDE 12.5 MG/1
12.5 TABLET ORAL DAILY
Qty: 30 TABLET | Refills: 11 | Status: SHIPPED | OUTPATIENT
Start: 2025-07-25 | End: 2026-07-25

## 2025-07-25 RX ORDER — LOSARTAN POTASSIUM 100 MG/1
100 TABLET ORAL DAILY
Qty: 90 TABLET | Refills: 3 | Status: SHIPPED | OUTPATIENT
Start: 2025-07-25 | End: 2026-07-25

## 2025-07-25 NOTE — TELEPHONE ENCOUNTER
Adding hctz 12.5 daily and increased losartan to 100 mg tablet daily  Pt can double his 50's losartan, take 2 together until they run out and then start 100 mg tablet

## 2025-08-06 ENCOUNTER — PATIENT MESSAGE (OUTPATIENT)
Dept: CARDIOLOGY | Facility: HOSPITAL | Age: 75
End: 2025-08-06
Payer: MEDICARE

## 2025-08-06 ENCOUNTER — TELEPHONE (OUTPATIENT)
Dept: CARDIOLOGY | Facility: HOSPITAL | Age: 75
End: 2025-08-06
Payer: MEDICARE

## 2025-08-06 NOTE — TELEPHONE ENCOUNTER
I had the pleasure of discussing your upcoming Cardiac MRI scheduled for 08/12/2025 @ 9:00 at [facility] at [address] [facility] [address] [address] from the [facility]. Please aim to arrive at least 20-30 minutes before your scheduled time to allow staff time to check you in for the test as well as do their prep for the MRI to ensure this is done safely.    We discussed and ensured that you will arrive for the scheduled appointment and confirmed the absence of a pacemaker/defibrillator, the absence of a cerebral aneurysm or surgical clip, pump, nerve or brain stimulator, middle or inner ear prosthesis or other metal implant or being injured by a metal object (i.e. bullet, bb, shrapnel).    What is it? Imaging to look at your heart and its surrounding structures with and/or without the administration of Gadolinium contrast. Patients with pacemakers previously could not be scanned. However, new pacemakers have been designed to withstand MRI scans safely. Also, it has been determined that many of the older pacemakers can be scanned safely under the proper conditions.     Why are you having this test? An MRI is used to visualize various structures and tissue types within the body. Because it provides a high level of detail, an MRI is preferred for a wide variety of purposes. Your doctor has decided that an MRI is necessary to visualize the pathology present.    NO special preparation is required for this particular MRI. You can eat and take medications as you normally do.    The test should take about 40-50 minutes to complete. It is important to hold still for the exam or the pictures will be unreadable. If you are claustrophobic or have pain issues that may interfere with your ability to stay still, please discuss this with the ordering provider. You may or may not receive IV Gadolinium during the exam if this is indicated, so an IV will be placed. THIS IS NOT A DYE AND DOES NOT CONTAIN  IODINE. Your heart rate, blood pressure, and oxygen saturation will be continuously monitored throughout the exam. Rescue medications will be on hand in the event of any issues.      Thank you again for your time today. I can be reached at [phone], M-F from 7:30-4.

## 2025-08-12 ENCOUNTER — HOSPITAL ENCOUNTER (OUTPATIENT)
Dept: RADIOLOGY | Facility: HOSPITAL | Age: 75
Discharge: HOME | End: 2025-08-12
Attending: STUDENT IN AN ORGANIZED HEALTH CARE EDUCATION/TRAINING PROGRAM
Payer: MEDICARE

## 2025-08-12 DIAGNOSIS — I42.8 LV NON-COMPACTION CARDIOMYOPATHY: ICD-10-CM

## 2025-08-12 DIAGNOSIS — I42.2 HYPERTROPHIC CARDIOMYOPATHY: ICD-10-CM

## 2025-08-12 DIAGNOSIS — I42.1 HOCM (HYPERTROPHIC OBSTRUCTIVE CARDIOMYOPATHY): ICD-10-CM

## 2025-08-12 PROCEDURE — A9585 GADOBUTROL INJECTION: HCPCS | Performed by: STUDENT IN AN ORGANIZED HEALTH CARE EDUCATION/TRAINING PROGRAM

## 2025-08-12 PROCEDURE — 25500020 PHARM REV CODE 255: Performed by: STUDENT IN AN ORGANIZED HEALTH CARE EDUCATION/TRAINING PROGRAM

## 2025-08-12 PROCEDURE — 75561 CARDIAC MRI FOR MORPH W/DYE: CPT | Mod: TC

## 2025-08-12 RX ORDER — GADOBUTROL 604.72 MG/ML
10 INJECTION INTRAVENOUS
Status: COMPLETED | OUTPATIENT
Start: 2025-08-12 | End: 2025-08-12

## 2025-08-12 RX ADMIN — GADOBUTROL 10 ML: 604.72 INJECTION INTRAVENOUS at 09:08

## 2025-08-13 ENCOUNTER — TELEPHONE (OUTPATIENT)
Dept: CARDIOLOGY | Facility: CLINIC | Age: 75
End: 2025-08-13
Payer: MEDICARE

## 2025-08-18 RX ORDER — GABAPENTIN 300 MG/1
300 CAPSULE ORAL 2 TIMES DAILY
Qty: 180 CAPSULE | Refills: 1 | Status: SHIPPED | OUTPATIENT
Start: 2025-08-18